# Patient Record
Sex: MALE | Race: WHITE | NOT HISPANIC OR LATINO | Employment: OTHER | URBAN - METROPOLITAN AREA
[De-identification: names, ages, dates, MRNs, and addresses within clinical notes are randomized per-mention and may not be internally consistent; named-entity substitution may affect disease eponyms.]

---

## 2017-05-25 ENCOUNTER — OFFICE VISIT - HEALTHEAST (OUTPATIENT)
Dept: VASCULAR SURGERY | Facility: CLINIC | Age: 62
End: 2017-05-25

## 2017-05-25 ENCOUNTER — AMBULATORY - HEALTHEAST (OUTPATIENT)
Dept: VASCULAR SURGERY | Facility: CLINIC | Age: 62
End: 2017-05-25

## 2017-05-25 ENCOUNTER — RECORDS - HEALTHEAST (OUTPATIENT)
Dept: VASCULAR ULTRASOUND | Facility: CLINIC | Age: 62
End: 2017-05-25

## 2017-05-25 DIAGNOSIS — I10 HTN (HYPERTENSION): ICD-10-CM

## 2017-05-25 DIAGNOSIS — Z72.0 TOBACCO ABUSE: ICD-10-CM

## 2017-05-25 DIAGNOSIS — I71.40 ABDOMINAL AORTIC ANEURYSM (AAA) WITHOUT RUPTURE (H): ICD-10-CM

## 2017-05-25 DIAGNOSIS — I71.40 ABDOMINAL AORTIC ANEURYSM, WITHOUT RUPTURE: ICD-10-CM

## 2017-05-25 ASSESSMENT — MIFFLIN-ST. JEOR: SCORE: 1906.17

## 2017-06-09 ENCOUNTER — RECORDS - HEALTHEAST (OUTPATIENT)
Dept: ADMINISTRATIVE | Facility: OTHER | Age: 62
End: 2017-06-09

## 2017-06-14 ENCOUNTER — COMMUNICATION - HEALTHEAST (OUTPATIENT)
Dept: SURGERY | Facility: CLINIC | Age: 62
End: 2017-06-14

## 2021-05-31 VITALS — HEIGHT: 72 IN | BODY MASS INDEX: 32.64 KG/M2 | WEIGHT: 241 LBS

## 2021-06-10 NOTE — PROGRESS NOTES
HPI: Mr. Bailey presents for follow-up of his abdominal aortic aneurysm.  He has been doing well, with no significant abdominal or back pain.    Allergies, Medications, Social History, Past Medical History and Past Surgical History were reviewed and are noted in the chart.      BP (!) 142/100 (Patient Site: Left Arm, Patient Position: Sitting, Cuff Size: Adult Regular)  Pulse 78  Temp 98.2  F (36.8  C) (Oral)   Resp 16  Ht 6' (1.829 m)  Wt (!) 241 lb (109.3 kg)  BMI 32.69 kg/m2  Body mass index is 32.69 kg/(m^2).    EXAM:  GENERAL: This is an obese male in no distress.      IMAGES:   Us Aorta Ivc Iliac Non Graft Complete    Result Date: 5/25/2017  US AORTA IVC ILIAC NON GRAFT COMPLETE 5/25/2017 9:40 AM INDICATION: aaa TECHNIQUE: Ultrasound using gray-scale, two-dimensional images. COMPARISON: 11/10/2016 FINDINGS: There is mild atheromatous plaque in the abdominal aorta. MEASUREMENTS: Abdominal Aorta: Proximal: 2.3 x 2.2 cm Mid: 3 x 2.7 cm Distal: 4.4 x 5.6 cm Right Common Iliac Artery: 2.1 x 2.2 cm Left Common Iliac Artery: 1.4 x 1.6 cm     CONCLUSION: 1.  Infrarenal abdominal aortic aneurysm which extends into the right common iliac artery. The aneurysm has increased in size in comparison to previous study.      I reviewed his ultrasound with the patient. The raw numbers show the aneurysm measures 5.6 cm, compared to 5.0 cm on the previous occasion.    Assessment/Plan: Mr. Bailey appears to be doing well.   The aneurysm is at a size where we should intervene.  I had a long discussion with Mr. Bailey, and the session was entirely consultative. I discussed both stenting and open repair, and edda diagrams of both procedures. Open repair involves significant initial risk (3% mortality, 15-20% complications) but is durable once the perioperative period has passed.  In contrast, stenting involves little initial risk but has an ongoing failure rate of approximately 2% per year.  Comparison at approximately 10-15  years shows no difference in survival rate between the 2 procedures.  In young, healthy patients we would recommend open operation, and in elderly frail patients we would recommend stenting.  There is a large gray area in between those 2 extremes, and I believe the patient falls within that gray area.  All in all, I have recommended that we should proceed with stenting if her aneurysm is amenable to the stent procedure.   We will obtain a CTA to confirm the size of the aneurysm, and to evaluate the aneurysm for endovascular repair. He will return following the CTA.       Josemanuel Hayden MD

## 2021-06-10 NOTE — PROGRESS NOTES
Follow-up, AAA, aortic US done 11/10/2016 showed aneurysm to measure 4.4 x 5.0 cm. Patient has aortic imaging prior to appt shows an increase in the size of the aneurysm. Patients b/p today is 142/100, patient had been on Atenolol, this was d/c and patient was started on lisinopril. He also continues to smoke.

## 2021-10-16 ENCOUNTER — HOSPITAL ENCOUNTER (INPATIENT)
Facility: HOSPITAL | Age: 66
LOS: 2 days | Discharge: HOME | DRG: 395 | End: 2021-10-18
Attending: HOSPITALIST | Admitting: HOSPITALIST
Payer: COMMERCIAL

## 2021-10-16 ENCOUNTER — ANCILLARY PROCEDURE (OUTPATIENT)
Dept: RADIOLOGY | Facility: CLINIC | Age: 66
End: 2021-10-16
Payer: COMMERCIAL

## 2021-10-16 ENCOUNTER — TRANSFERRED RECORDS (OUTPATIENT)
Dept: HEALTH INFORMATION MANAGEMENT | Facility: CLINIC | Age: 66
End: 2021-10-16

## 2021-10-16 DIAGNOSIS — K56.609 SBO (SMALL BOWEL OBSTRUCTION) (H): Primary | ICD-10-CM

## 2021-10-16 DIAGNOSIS — F17.200 TOBACCO USE DISORDER: ICD-10-CM

## 2021-10-16 DIAGNOSIS — I71.40 ENLARGING ABDOMINAL AORTIC ANEURYSM (AAA) (H): ICD-10-CM

## 2021-10-16 DIAGNOSIS — M75.21 BICEPS TENDONITIS, RIGHT: ICD-10-CM

## 2021-10-16 DIAGNOSIS — K43.9 VENTRAL HERNIA WITHOUT OBSTRUCTION OR GANGRENE: ICD-10-CM

## 2021-10-16 DIAGNOSIS — I10 ESSENTIAL HYPERTENSION: ICD-10-CM

## 2021-10-16 LAB
ALBUMIN SERPL-MCNC: 3.3 G/DL (ref 3.5–5)
ALP SERPL-CCNC: 75 U/L (ref 45–120)
ALT SERPL W P-5'-P-CCNC: 16 U/L (ref 0–45)
ANION GAP SERPL CALCULATED.3IONS-SCNC: 11 MMOL/L (ref 5–18)
AST SERPL W P-5'-P-CCNC: 18 U/L (ref 0–40)
BILIRUB SERPL-MCNC: 1.3 MG/DL (ref 0–1)
BUN SERPL-MCNC: 30 MG/DL (ref 8–22)
CALCIUM SERPL-MCNC: 9.7 MG/DL (ref 8.5–10.5)
CHLORIDE BLD-SCNC: 104 MMOL/L (ref 98–107)
CO2 SERPL-SCNC: 23 MMOL/L (ref 22–31)
CREAT SERPL-MCNC: 1.39 MG/DL (ref 0.7–1.3)
GFR SERPL CREATININE-BSD FRML MDRD: 52 ML/MIN/1.73M2
GLUCOSE BLD-MCNC: 109 MG/DL (ref 70–125)
MAGNESIUM SERPL-MCNC: 2.3 MG/DL (ref 1.8–2.6)
POTASSIUM BLD-SCNC: 4.6 MMOL/L (ref 3.5–5)
PROT SERPL-MCNC: 7 G/DL (ref 6–8)
SARS-COV-2 RNA RESP QL NAA+PROBE: NEGATIVE
SODIUM SERPL-SCNC: 138 MMOL/L (ref 136–145)

## 2021-10-16 PROCEDURE — 120N000001 HC R&B MED SURG/OB

## 2021-10-16 PROCEDURE — 87635 SARS-COV-2 COVID-19 AMP PRB: CPT | Performed by: HOSPITALIST

## 2021-10-16 PROCEDURE — 36415 COLL VENOUS BLD VENIPUNCTURE: CPT | Performed by: HOSPITALIST

## 2021-10-16 PROCEDURE — 99223 1ST HOSP IP/OBS HIGH 75: CPT | Performed by: HOSPITALIST

## 2021-10-16 PROCEDURE — 258N000003 HC RX IP 258 OP 636: Performed by: HOSPITALIST

## 2021-10-16 PROCEDURE — 93005 ELECTROCARDIOGRAM TRACING: CPT

## 2021-10-16 PROCEDURE — 80053 COMPREHEN METABOLIC PANEL: CPT | Performed by: HOSPITALIST

## 2021-10-16 PROCEDURE — 93005 ELECTROCARDIOGRAM TRACING: CPT | Performed by: HOSPITALIST

## 2021-10-16 PROCEDURE — 250N000013 HC RX MED GY IP 250 OP 250 PS 637: Performed by: HOSPITALIST

## 2021-10-16 PROCEDURE — 999N000157 HC STATISTIC RCP TIME EA 10 MIN

## 2021-10-16 PROCEDURE — 83735 ASSAY OF MAGNESIUM: CPT | Performed by: HOSPITALIST

## 2021-10-16 PROCEDURE — 93010 ELECTROCARDIOGRAM REPORT: CPT | Performed by: INTERNAL MEDICINE

## 2021-10-16 PROCEDURE — 999N000054 HC STATISTIC EKG NON-CHARGEABLE

## 2021-10-16 RX ORDER — ACETAMINOPHEN 650 MG/1
650 SUPPOSITORY RECTAL EVERY 6 HOURS PRN
Status: DISCONTINUED | OUTPATIENT
Start: 2021-10-16 | End: 2021-10-18 | Stop reason: HOSPADM

## 2021-10-16 RX ORDER — ATENOLOL 25 MG/1
100 TABLET ORAL DAILY
Status: DISCONTINUED | OUTPATIENT
Start: 2021-10-16 | End: 2021-10-17

## 2021-10-16 RX ORDER — PROCHLORPERAZINE 25 MG
12.5 SUPPOSITORY, RECTAL RECTAL EVERY 12 HOURS PRN
Status: DISCONTINUED | OUTPATIENT
Start: 2021-10-16 | End: 2021-10-18 | Stop reason: HOSPADM

## 2021-10-16 RX ORDER — CARBOXYMETHYLCELLULOSE SODIUM 5 MG/ML
1 SOLUTION/ DROPS OPHTHALMIC 4 TIMES DAILY PRN
Status: DISCONTINUED | OUTPATIENT
Start: 2021-10-16 | End: 2021-10-18 | Stop reason: HOSPADM

## 2021-10-16 RX ORDER — ONDANSETRON 4 MG/1
4 TABLET, ORALLY DISINTEGRATING ORAL EVERY 6 HOURS PRN
Status: DISCONTINUED | OUTPATIENT
Start: 2021-10-16 | End: 2021-10-18 | Stop reason: HOSPADM

## 2021-10-16 RX ORDER — B-COMPLEX WITH VITAMIN C
100 TABLET ORAL DAILY
COMMUNITY

## 2021-10-16 RX ORDER — SODIUM CHLORIDE 9 MG/ML
INJECTION, SOLUTION INTRAVENOUS CONTINUOUS
Status: DISCONTINUED | OUTPATIENT
Start: 2021-10-16 | End: 2021-10-18

## 2021-10-16 RX ORDER — LABETALOL HYDROCHLORIDE 5 MG/ML
10 INJECTION, SOLUTION INTRAVENOUS EVERY 4 HOURS PRN
Status: DISCONTINUED | OUTPATIENT
Start: 2021-10-16 | End: 2021-10-18 | Stop reason: HOSPADM

## 2021-10-16 RX ORDER — LIDOCAINE 40 MG/G
CREAM TOPICAL
Status: DISCONTINUED | OUTPATIENT
Start: 2021-10-16 | End: 2021-10-18 | Stop reason: HOSPADM

## 2021-10-16 RX ORDER — ONDANSETRON 2 MG/ML
4 INJECTION INTRAMUSCULAR; INTRAVENOUS EVERY 6 HOURS PRN
Status: DISCONTINUED | OUTPATIENT
Start: 2021-10-16 | End: 2021-10-18 | Stop reason: HOSPADM

## 2021-10-16 RX ORDER — BISACODYL 10 MG
10 SUPPOSITORY, RECTAL RECTAL DAILY PRN
Status: DISCONTINUED | OUTPATIENT
Start: 2021-10-16 | End: 2021-10-18 | Stop reason: HOSPADM

## 2021-10-16 RX ORDER — HEPARIN SODIUM 5000 [USP'U]/.5ML
5000 INJECTION, SOLUTION INTRAVENOUS; SUBCUTANEOUS EVERY 12 HOURS
Status: DISCONTINUED | OUTPATIENT
Start: 2021-10-16 | End: 2021-10-18 | Stop reason: HOSPADM

## 2021-10-16 RX ORDER — PROCHLORPERAZINE MALEATE 5 MG
5 TABLET ORAL EVERY 6 HOURS PRN
Status: DISCONTINUED | OUTPATIENT
Start: 2021-10-16 | End: 2021-10-18 | Stop reason: HOSPADM

## 2021-10-16 RX ORDER — AMOXICILLIN 250 MG
2 CAPSULE ORAL 2 TIMES DAILY PRN
Status: DISCONTINUED | OUTPATIENT
Start: 2021-10-16 | End: 2021-10-18 | Stop reason: HOSPADM

## 2021-10-16 RX ORDER — MAGNESIUM HYDROXIDE/ALUMINUM HYDROXICE/SIMETHICONE 120; 1200; 1200 MG/30ML; MG/30ML; MG/30ML
30 SUSPENSION ORAL EVERY 4 HOURS PRN
Status: DISCONTINUED | OUTPATIENT
Start: 2021-10-16 | End: 2021-10-18 | Stop reason: HOSPADM

## 2021-10-16 RX ORDER — CHLORAL HYDRATE 500 MG
4 CAPSULE ORAL 2 TIMES DAILY
COMMUNITY

## 2021-10-16 RX ORDER — AMOXICILLIN 250 MG
1 CAPSULE ORAL 2 TIMES DAILY PRN
Status: DISCONTINUED | OUTPATIENT
Start: 2021-10-16 | End: 2021-10-18 | Stop reason: HOSPADM

## 2021-10-16 RX ORDER — ACETAMINOPHEN 325 MG/1
650 TABLET ORAL EVERY 6 HOURS PRN
Status: DISCONTINUED | OUTPATIENT
Start: 2021-10-16 | End: 2021-10-18 | Stop reason: HOSPADM

## 2021-10-16 RX ADMIN — ATENOLOL 100 MG: 25 TABLET ORAL at 21:29

## 2021-10-16 RX ADMIN — SODIUM CHLORIDE: 9 INJECTION, SOLUTION INTRAVENOUS at 20:59

## 2021-10-16 ASSESSMENT — ACTIVITIES OF DAILY LIVING (ADL)
CONCENTRATING,_REMEMBERING_OR_MAKING_DECISIONS_DIFFICULTY: NO
DRESSING/BATHING_DIFFICULTY: NO
WEAR_GLASSES_OR_BLIND: YES
DIFFICULTY_EATING/SWALLOWING: NO
PATIENT_/_FAMILY_COMMUNICATION_STYLE: SPOKEN LANGUAGE (ENGLISH OR BILINGUAL)
TOILETING_ISSUES: NO
DOING_ERRANDS_INDEPENDENTLY_DIFFICULTY: NO
FALL_HISTORY_WITHIN_LAST_SIX_MONTHS: NO
HEARING_DIFFICULTY_OR_DEAF: NO
DIFFICULTY_COMMUNICATING: NO
WALKING_OR_CLIMBING_STAIRS_DIFFICULTY: NO

## 2021-10-16 ASSESSMENT — MIFFLIN-ST. JEOR: SCORE: 1883.02

## 2021-10-17 ENCOUNTER — APPOINTMENT (OUTPATIENT)
Dept: RADIOLOGY | Facility: HOSPITAL | Age: 66
DRG: 395 | End: 2021-10-17
Attending: STUDENT IN AN ORGANIZED HEALTH CARE EDUCATION/TRAINING PROGRAM
Payer: COMMERCIAL

## 2021-10-17 ENCOUNTER — APPOINTMENT (OUTPATIENT)
Dept: RADIOLOGY | Facility: HOSPITAL | Age: 66
DRG: 395 | End: 2021-10-17
Attending: HOSPITALIST
Payer: COMMERCIAL

## 2021-10-17 PROBLEM — F17.200 TOBACCO USE DISORDER: Status: ACTIVE | Noted: 2021-10-17

## 2021-10-17 PROBLEM — N17.9 AKI (ACUTE KIDNEY INJURY) (H): Status: ACTIVE | Noted: 2021-10-17

## 2021-10-17 LAB
ANION GAP SERPL CALCULATED.3IONS-SCNC: 6 MMOL/L (ref 5–18)
ATRIAL RATE - MUSE: 72 BPM
BUN SERPL-MCNC: 34 MG/DL (ref 8–22)
CALCIUM SERPL-MCNC: 8.7 MG/DL (ref 8.5–10.5)
CHLORIDE BLD-SCNC: 107 MMOL/L (ref 98–107)
CO2 SERPL-SCNC: 28 MMOL/L (ref 22–31)
CREAT SERPL-MCNC: 1.52 MG/DL (ref 0.7–1.3)
DIASTOLIC BLOOD PRESSURE - MUSE: NORMAL MMHG
ERYTHROCYTE [DISTWIDTH] IN BLOOD BY AUTOMATED COUNT: 14.9 % (ref 10–15)
GFR SERPL CREATININE-BSD FRML MDRD: 46 ML/MIN/1.73M2
GLUCOSE BLD-MCNC: 94 MG/DL (ref 70–125)
HCT VFR BLD AUTO: 39.4 % (ref 40–53)
HGB BLD-MCNC: 12.7 G/DL (ref 13.3–17.7)
INTERPRETATION ECG - MUSE: NORMAL
MAGNESIUM SERPL-MCNC: 2.4 MG/DL (ref 1.8–2.6)
MCH RBC QN AUTO: 31.8 PG (ref 26.5–33)
MCHC RBC AUTO-ENTMCNC: 32.2 G/DL (ref 31.5–36.5)
MCV RBC AUTO: 99 FL (ref 78–100)
P AXIS - MUSE: 8 DEGREES
PLATELET # BLD AUTO: 265 10E3/UL (ref 150–450)
POTASSIUM BLD-SCNC: 4.3 MMOL/L (ref 3.5–5)
PR INTERVAL - MUSE: 168 MS
QRS DURATION - MUSE: 94 MS
QT - MUSE: 394 MS
QTC - MUSE: 431 MS
R AXIS - MUSE: 49 DEGREES
RBC # BLD AUTO: 4 10E6/UL (ref 4.4–5.9)
SODIUM SERPL-SCNC: 141 MMOL/L (ref 136–145)
SYSTOLIC BLOOD PRESSURE - MUSE: NORMAL MMHG
T AXIS - MUSE: 68 DEGREES
VENTRICULAR RATE- MUSE: 72 BPM
WBC # BLD AUTO: 6.9 10E3/UL (ref 4–11)

## 2021-10-17 PROCEDURE — 999N000065 XR ABDOMEN PORT 1 VIEWS

## 2021-10-17 PROCEDURE — 99232 SBSQ HOSP IP/OBS MODERATE 35: CPT | Performed by: FAMILY MEDICINE

## 2021-10-17 PROCEDURE — 250N000011 HC RX IP 250 OP 636: Performed by: HOSPITALIST

## 2021-10-17 PROCEDURE — 80048 BASIC METABOLIC PNL TOTAL CA: CPT | Performed by: HOSPITALIST

## 2021-10-17 PROCEDURE — 99207 PR NOT IN PERSON INPATIENT CONSULT STATISTICAL MARKER: CPT | Performed by: SURGERY

## 2021-10-17 PROCEDURE — 85027 COMPLETE CBC AUTOMATED: CPT | Performed by: HOSPITALIST

## 2021-10-17 PROCEDURE — 250N000013 HC RX MED GY IP 250 OP 250 PS 637: Performed by: MASSAGE THERAPIST

## 2021-10-17 PROCEDURE — 36415 COLL VENOUS BLD VENIPUNCTURE: CPT | Performed by: HOSPITALIST

## 2021-10-17 PROCEDURE — 99222 1ST HOSP IP/OBS MODERATE 55: CPT | Performed by: SURGERY

## 2021-10-17 PROCEDURE — 99207 PR CDG-MDM COMPONENT: MEETS MODERATE - DOWN CODED: CPT | Performed by: FAMILY MEDICINE

## 2021-10-17 PROCEDURE — 83735 ASSAY OF MAGNESIUM: CPT | Performed by: HOSPITALIST

## 2021-10-17 PROCEDURE — 258N000003 HC RX IP 258 OP 636: Performed by: HOSPITALIST

## 2021-10-17 PROCEDURE — 74018 RADEX ABDOMEN 1 VIEW: CPT

## 2021-10-17 PROCEDURE — 120N000001 HC R&B MED SURG/OB

## 2021-10-17 PROCEDURE — 99207 PR NO CHARGE LOS: CPT | Performed by: PHYSICIAN ASSISTANT

## 2021-10-17 RX ORDER — NICOTINE 21 MG/24HR
1 PATCH, TRANSDERMAL 24 HOURS TRANSDERMAL DAILY PRN
Status: DISCONTINUED | OUTPATIENT
Start: 2021-10-17 | End: 2021-10-18 | Stop reason: HOSPADM

## 2021-10-17 RX ORDER — HYDRALAZINE HYDROCHLORIDE 20 MG/ML
10 INJECTION INTRAMUSCULAR; INTRAVENOUS EVERY 4 HOURS PRN
Status: DISCONTINUED | OUTPATIENT
Start: 2021-10-17 | End: 2021-10-18 | Stop reason: HOSPADM

## 2021-10-17 RX ORDER — ATENOLOL 25 MG/1
100 TABLET ORAL DAILY
Status: COMPLETED | OUTPATIENT
Start: 2021-10-17 | End: 2021-10-17

## 2021-10-17 RX ADMIN — ATENOLOL 100 MG: 25 TABLET ORAL at 20:35

## 2021-10-17 RX ADMIN — SODIUM CHLORIDE: 9 INJECTION, SOLUTION INTRAVENOUS at 05:14

## 2021-10-17 RX ADMIN — SODIUM CHLORIDE: 9 INJECTION, SOLUTION INTRAVENOUS at 15:55

## 2021-10-17 RX ADMIN — HEPARIN SODIUM 5000 UNITS: 10000 INJECTION, SOLUTION INTRAVENOUS; SUBCUTANEOUS at 20:35

## 2021-10-17 RX ADMIN — DIATRIZOATE MEGLUMINE AND DIATRIZOATE SODIUM 75 ML: 660; 100 SOLUTION ORAL; RECTAL at 03:17

## 2021-10-17 ASSESSMENT — MIFFLIN-ST. JEOR: SCORE: 1877.58

## 2021-10-17 NOTE — H&P
Admission History and Physical   Jose Bailey,  1955, MRN [id number]    [facility]    PCP: No primary care provider on file., None          Extended Emergency Contact Information  Primary Emergency Contact: Lalita Bailey   [facility]  Home Phone: [phone]  Relation: Other       Assessment and Plan     66M with AAA, umbilical hernia, tobacco abuse, HTN who presents with abdominal pain and is found to have a SBO and enlarging AAA    #SBO - transition point read as proximal to hernia.  Incisional ventral hernia is soft and reducible.  -keep NG LIWS, , NPO, IVF, SBFT in AM, surgery consult    #enlarging AAA (8cm from 5.5 previously) - BP control, needs to quit smoking, vascular consult.    #HTN - atenolol, labetalol PRN    #CONNIE - CPAP    #THOMPSON vs. CKD3 - IVF and monitor    #tobacco abuse (1PPD) - cessation.      Clinically Significant Risk Factors Present on Admission                     Checklist:  Code Status:   full  Diet:   NPO  Hernadez Catheter: Not present  Central Lines: None  DVT px:  Heparin SQ        Advanced Care Planning  I anticipate the patient will be admitted to the hospital for at least 2 midnights for the evaluation and treatment of the conditions discussed above.     Chief Complaint: Abdominal pain     HPI:    Jose Bailey is a 66 year old male AAA (didn't follow-up in 2017, 5.6cm at the time), umbilical hernia, HTN, 50pack year smoking history (ongoing use) who presents with several days of painless abdominal bloating and inability to pass gas.  Has been having BM with last one this AM.  No nausea, emesis or abdominal pain.        Noted ER note in care everywhere regarding use of horse dewormers and chronic lyme.        In the ER:  CT demonstrated SBO with transition point just proximal to ventral hernia  and enlarging AAA now 8cm.   ER D/w vascular and no need for emergent intervention.  NG placed with return of 1L brownish content     Labs in care everywhere Cr  1.44, no K, glucose 116 WBC 11.8    History is provided by patient and chart review       Physical Exam:  BP: ()/()   Arterial Line BP: ()/()   /73 (BP Location: Left arm)   Pulse 70   Temp 98.6  F (37  C) (Oral)   Resp 16   Ht 1.829 m (6')   Wt 107.5 kg (237 lb)   SpO2 93%   BMI 32.14 kg/m       General:  Alert, cooperative, no distress,  Appears stated age  Neurologic:  oriented, facialsymmetry preserved, fluent speech. Moves all 4 spontaneously  Psych: calm, mood and affect appropriate to situation  HEENT:  Anicteric, MMM, unremarkable dentition  CV: RRR no MRG, normal S1 and S2, no edema  Lungs: CTAB.  Easyrespirations  Abd: softly distended, well healed midline surgical scar with hernia which is soft, non tender and easily reducible.    Skin: no rashes noted on exposed skin. Color and turgor normal  Central Lines and Tubes: None (no fraga, CVC, feeding tubes)         Pertinent Test Findings  Radiology Results (results reviewed):   No results found for this visit on 10/16/21.    EKG (personally reviewed): normal sinus rhythm and no acute ischemic changes      Medical History  No past medical history on file.    As above Surgical History  Past Surgical History:   Procedure Laterality Date     COLECTOMY       CYSTOSCOPY         Hx cholecystectomy, partial L hemicolectomy.   Social History  Social History     Tobacco Use     Smoking status: Current Every Day Smoker     Packs/day: 1.50     Years: 40.00     Pack years: 60.00     Types: Cigarettes, Cigarettes     Smokeless tobacco: Never Used   Substance Use Topics     Alcohol use: Yes     Drug use: No          Allergies  No Known Allergies Family History  I have reviewed this patient's family history and updated it with pertinent information if needed.  Family History   Problem Relation Age of Onset     Cerebrovascular Disease Mother      Aneurysm Mother      No Known Problems Father                Prior to Admission Medications   Cannot display prior to  admission medications because the patient has not been admitted in this contact.          Review of Systems:    10point review of systems negative except as listed in HPI      Pertinent Labs  Lab Results: personally reviewed.     Most Recent 3 CBC's:No lab results found.  Most Recent 3 BMP's:No lab results found.  Most Recent 2 LFT's:No lab results found.  Most Recent 3 INR's:No lab results found.  Most Recent 3 Troponin's:No lab results found.    Dayana Braxton MD  Internal Medicine Hospitalist  10/16/2021  7:32 PM

## 2021-10-17 NOTE — PROGRESS NOTES
Met with patient to discuss use of overnight CPAP. Patient says he doesn't use CPAP, doesn't own a CPAP machine, and is not interested in using a hospital unit.     Yudelka Abbasi, RT

## 2021-10-17 NOTE — CONSULTS
SW consult completed. See progress note dated 10/17/21.  Delmy Tavarez, HODA on 10/17/2021 at 3:09 PM

## 2021-10-17 NOTE — PROGRESS NOTES
Care Management Initial Consult    General Information  Assessment completed with: Patient,    Type of CM/SW Visit: Initial Assessment    Primary Care Provider verified and updated as needed:     Readmission within the last 30 days:        Reason for Consult: discharge planning  Advance Care Planning:            Communication Assessment  Patient's communication style: spoken language (English or Bilingual)    Hearing Difficulty or Deaf: no   Wear Glasses or Blind: yes    Cognitive  Cognitive/Neuro/Behavioral: WDL                      Living Environment:   People in home: alone     Current living Arrangements: mobile home      Able to return to prior arrangements: yes       Family/Social Support:  Care provided by: self  Provides care for: no one                Description of Support System:           Current Resources:   Patient receiving home care services:       Community Resources:    Equipment currently used at home: none  Supplies currently used at home:      Employment/Financial:  Employment Status:          Financial Concerns:             Lifestyle & Psychosocial Needs:  Social Determinants of Health     Tobacco Use: High Risk     Smoking Tobacco Use: Current Every Day Smoker     Smokeless Tobacco Use: Never Used   Alcohol Use:      Frequency of Alcohol Consumption:      Average Number of Drinks:      Frequency of Binge Drinking:    Financial Resource Strain:      Difficulty of Paying Living Expenses:    Food Insecurity:      Worried About Running Out of Food in the Last Year:      Ran Out of Food in the Last Year:    Transportation Needs:      Lack of Transportation (Medical):      Lack of Transportation (Non-Medical):    Physical Activity:      Days of Exercise per Week:      Minutes of Exercise per Session:    Stress:      Feeling of Stress :    Social Connections:      Frequency of Communication with Friends and Family:      Frequency of Social Gatherings with Friends and Family:      Attends [employer]  Services:      Active Member of Clubs or Organizations:      Attends Club or Organization Meetings:      Marital Status:    Intimate Partner Violence:      Fear of Current or Ex-Partner:      Emotionally Abused:      Physically Abused:      Sexually Abused:    Depression:      PHQ-2 Score:    Housing Stability:      Unable to Pay for Housing in the Last Year:      Number of Places Lived in the Last Year:      Unstable Housing in the Last Year:          Additional Information:  SW met with pt for initial assessment. Pt reports living alone in a trailer and being normally independent with no services. Pt denies discharge needs currently but is agreeable to SW following care for increased needs.    Delmy Tavarez, TOMMIESW

## 2021-10-17 NOTE — PLAN OF CARE
Problem: Fluid Deficit (Intestinal Obstruction)  Goal: Fluid Balance  Outcome: Improving     Problem: Nausea and Vomiting (Intestinal Obstruction)  Goal: Nausea and Vomiting Relief  Outcome: Improving     Problem: Pain (Intestinal Obstruction)  Goal: Acceptable Pain Control  Outcome: Improving     Pt denied pain this shift, up with SBA to bathroom. NPO for gastrograffin challenge this AM. Contrast given at 0325 and NG suction turned off, has denied nausea since then. His x-ray will be done 6643-1684. His HR overnight was praveen and irregular, MD notified, no new orders. Pt is passing flatus, and had 1 BM as well.

## 2021-10-17 NOTE — PLAN OF CARE
Problem: Adult Inpatient Plan of Care  Goal: Plan of Care Review  Outcome: Improving   /71. MD notified and atenolol given. Recheck /71.   Problem: Fluid Deficit (Intestinal Obstruction)  Goal: Fluid Balance  Outcome: Improving   Patient on IVF NS running at 100 ml/hr. Started on clear liquid tolerated well.  Problem: Nausea and Vomiting (Intestinal Obstruction)  Goal: Nausea and Vomiting Relief  Outcome: Improving   NG clamped patient tolerated well. No nausea or vomiting noted.  Problem: Pain (Intestinal Obstruction)  Goal: Acceptable Pain Control  Outcome: Improving   Patient denied any pain or discomfort. Passing gas and had loss/watery BM.

## 2021-10-17 NOTE — PHARMACY-ADMISSION MEDICATION HISTORY
Pharmacy Note - Admission Medication History    Pertinent Provider Information:      ______________________________________________________________________    Prior To Admission (PTA) med list completed and updated in EMR.       PTA Med List   Medication Sig Last Dose     ascorbic acid, vitamin C, (VITAMIN C) 1000 MG tablet Take 2,000 mg by mouth daily  10/15/2021 at am     atenolol (TENORMIN) 100 MG tablet Take 100 mg by mouth daily  10/16/2021 at Unknown time     b complex vitamins (B COMPLEX 1) tablet Take 1 tablet by mouth daily  10/15/2021 at am     Charcoal Activated (ACTIVATED CHARCOAL PO) Take 2 capsules by mouth daily 10/15/2021 at Unknown time     cholecalciferol (VITAMIN D3) 125 mcg (5000 units) capsule Take 125 mcg by mouth daily 10/15/2021 at Unknown time     docusate sodium (STOOL SOFTENER) 100 MG capsule Take 100 mg by mouth 2 times daily  10/15/2021 at Unknown time     fish oil-omega-3 fatty acids 1000 MG capsule Take 4 g by mouth 2 times daily 10/15/2021 at Unknown time     Milk Thistle 175 MG CAPS Take 2 capsules by mouth daily 10/15/2021 at Unknown time     Misc Natural Products (URINOZINC PROSTATE PO) Take 1 Tablespoonful by mouth 2 times daily 10/15/2021 at Unknown time     saw palmetto fruit 450 mg cap Take 900 mg by mouth 2 times daily  10/15/2021 at Unknown time     Zinc 100 MG TABS Take 100 mg by mouth daily 10/15/2021 at Unknown time       Information source(s): Patient and Clinic records  Method of interview communication: phone    Summary of Changes to PTA Med List  New: milk thistle, activated charcoal, Vitamin D, zinc  Discontinued: azithromycin, cefuroxime, cholestryamine, ciprofloxacin, doxycycline, metronidazole  Changed: Fish Oil    Patient was asked about OTC/herbal products specifically.  PTA med list reflects this.    In the past week, patient estimated taking medication this percent of the time:  greater than 90%.    Allergies were reviewed, assessed, and updated with the  patient.      Patient does not use any multi-dose medications prior to admission.    The information provided in this note is only as accurate as the sources available at the time of the update(s).    Thank you for the opportunity to participate in the care of this patient.    Lisa Landon [facility]  10/16/2021 9:48 PM

## 2021-10-17 NOTE — CONSULTS
[facility]ERY [facility] PATIENT CONSULTATION NOTE    HPI:  Mr. Jose Bailey is a 65 yo gentleman with a history of laparotomy and sigmoidectomy for diverticular disease (2004) who was transferred from [facility] for management of a high-grade small bowel obstruction in the setting of a ventral abdominal hernia. Vascular Surgery consulted for management of an infrarenal abdominal aortic aneurysm measuring 8.0 x 6.8 cm. He is currently asymptomatic - no abdominal or back pain. He is a current 40 pack year smoking history. He lives at home by himself. Reports that he ambulates quite a bit and is relatively active - cuts wood regularly.     REFERRAL SOURCE: Hospitalist     PAST MEDICAL HISTORY:  No significant medical history    PAST SURGICAL HISTORY:  Laparotomy and sigmoidectomy for diverticular disease - 2004     FAMILY HISTORY:  Family History   Problem Relation Age of Onset     Cerebrovascular Disease Mother      Aneurysm Mother      No Known Problems Father        SOCIAL HISTORY:   Social History     Tobacco Use     Smoking status: Current Every Day Smoker     Packs/day: 1.50     Years: 40.00     Pack years: 60.00     Types: Cigarettes, Cigarettes     Smokeless tobacco: Never Used   Substance Use Topics     Alcohol use: Yes       TOBACCO USE:  Every day smoker     FUNCTIONAL CAPACITY:    Lives at home by himself. . Cuts wood regularly.     HOME MEDICATIONS:  Prior to Admission medications    Medication Sig Start Date End Date Taking? Authorizing Provider   ascorbic acid, vitamin C, (VITAMIN C) 1000 MG tablet Take 2,000 mg by mouth daily  4/4/16  Yes Provider, Historical   atenolol (TENORMIN) 100 MG tablet Take 100 mg by mouth daily  2/8/16  Yes Provider, Historical   b complex vitamins (B COMPLEX 1) tablet Take 1 tablet by mouth daily  4/4/16  Yes Provider, Historical   Charcoal Activated (ACTIVATED CHARCOAL PO) Take 2 capsules by mouth daily   Yes Unknown, Entered By History    cholecalciferol (VITAMIN D3) 125 mcg (5000 units) capsule Take 125 mcg by mouth daily   Yes Unknown, Entered By History   docusate sodium (STOOL SOFTENER) 100 MG capsule Take 100 mg by mouth 2 times daily  4/4/16  Yes Provider, Historical   fish oil-omega-3 fatty acids 1000 MG capsule Take 4 g by mouth 2 times daily   Yes Unknown, Entered By History   Milk Thistle 175 MG CAPS Take 2 capsules by mouth daily   Yes Unknown, Entered By History   Misc Natural Products (URINOZINC PROSTATE PO) Take 1 Tablespoonful by mouth 2 times daily   Yes Unknown, Entered By History   saw palmetto fruit 450 mg cap Take 900 mg by mouth 2 times daily  8/4/15  Yes Provider, Historical   Zinc 100 MG TABS Take 100 mg by mouth daily   Yes Unknown, Entered By History       VITAL SIGNS:  Temp: 98.3  F (36.8  C) Temp src: Oral BP: 121/60 Pulse: 50   Resp: 16 SpO2: 93 % O2 Device: Nasal cannula Oxygen Delivery: 1 LPM    237 lbs 0 oz    PHYSICAL EXAM:  GEN: Well-appearing; no acute distress; obese habitus   LUNGS: Unlabored respirations on room air  CARDIO: Regular rate and rhythm  ABD: S, NT, ND; well healed laparotomy incision; unable to feel large pulsatile mass in the abdomen    VASC: 2+ palpable femoral pulses bilaterally; 2+ DP pulses bilaterally     CT:   IMPRESSION:   1.  High-grade small bowel obstruction with the transition point in the upper mid abdomen, just proximal to the ventral abdominal hernia.   2.  An 8.0 cm abdominal aortic artery aneurysm, previously 5.0 cm. Recommend vascular surgery consultation.       ASSESSMENT:  Patient Active Problem List   Diagnosis     Essential hypertension     Irritable bowel syndrome     Obstructive sleep apnea syndrome     Benign prostatic hyperplasia with urinary obstruction     Enlarging abdominal aortic aneurysm (AAA) (H)     SBO (small bowel obstruction) (H)   67 yo gentleman with a history of laparotomy and sigmoidectomy for diverticular disease (2004) who was transferred from [facility]  [facility] for management of a high-grade small bowel obstruction in the setting of a ventral abdominal hernia. Vascular Surgery consulted for an asymptomatic 8.0 cm infrarenal abdominal aortic aneurysm.     PLAN:  - No acute vascular surgery intervention; will need repair of aneurysm once recovered from bowel obstruction  - Will obtain CTA Abdomen and Pelvis with 1 mm cuts for operative planning; currently IVF hydration   - Will continue to follow peripherally     Discussed with Dr. Tejada who is in agreement with the above plan.    Antwon Miguel MD  Vascular Surgery Fellow

## 2021-10-17 NOTE — CONSULTS
"General Surgery Consultation  Jose Bailey MRN# [id number]   Age/Sex: 66 year old male YOB: 1955     Reason for consult: No diagnosis found.        Requesting physician: Dr. Braxton                   Assessment and Plan:   Assessment:  SBO    Plan:  Reviewed findings with him. Gastrografin was given early this AM, tolerating so far with NG clamped. Plan for repeat XR around noon today. If contrast makes it to colon, would be ok to remove NG and start on clear liquids. If not, return NG to LIS and make NPO. No surgical intervention indicated at this time. Will continue to follow.        Dominick Clark PA-C  Pager - [phone]  Phone - [phone]   General Surgery    I have seen and examined the patient.  I have reviewed and agree with the note written by the NP/PA team member.   History of diverticulitis status post resection in 2007 who was developed incisional hernia after doing strenuous activity such as wood splitting.  Has noted a bulge in his midline incision for several years with intermittent nausea but no emesis.  States he was doing relatively well up until couple days ago when he states he was unable to \"pass or move gas\".  Denies any emesis but has had nausea.  Has no pain at present.  Had bowel movements with successful Gastrografin challenge.  Currently sitting in bed and is hungry.  Abdomen-soft, palpable soft incisional hernia approximately 4 to 5 cm in diameter with palpable loops of small intestine, no peritoneal findings  CT scan-report read.  Unfortunately I do not have the images to review.  Would like to obtain these for preoperative planning    Assessment/plan-small bowel obstruction which appears to have resolved in the setting of a chronic ventral hernia in addition to an 8 cm abdominal aortic aneurysm which apparently has enlarged in between previous CT scans.  -At this point the patient seems to be doing well from a GI standpoint.  We can start him on clear liquids and keep " the NG tube clamped.  If he tolerates this then we can pull NG tube and advance his diet.  I think the more pressing issue is his AAA which has been evaluated by vascular surgery.  There are possible plans for endovascular repair in the future.  This would obviously be to his advantage if he is a candidate.  If he is able to undergo minimally invasive AAA repair then we can discuss robotic approach and abdominal wall reconstruction in the future.  Otherwise, if he is not a candidate for endovascular repair and an open repair is planned then he can have his hernia repair at that time.  -We will continue to follow    Ubaldo Alba D.O. [facility]  [phone]  [facility] Department of Surgery           Chief Complaint:   No chief complaint on file.       History is obtained from the patient    HPI:   Jose Bailey is a 66 year old male who presents with complaints of abdominal bloating and cramping for several days. He presented to an outside ER where CT showed SBO just proximal to his ventral hernia. He was admitted here, a NG was placed and gastrografin challenge was started. He currently feels fine. No pain. No n/v and tolerating NG clamped.    He has had a previous colon resection for diverticulitis and has a known ventral hernia for that has not caused him any issues for the past 12-13 years.          Past Medical History:   No past medical history on file.           Past Surgical History:     Past Surgical History:   Procedure Laterality Date     COLECTOMY       CYSTOSCOPY               Social History:    reports that he has been smoking cigarettes and cigarettes. He has a 60.00 pack-year smoking history. He has never used smokeless tobacco. He reports current alcohol use. He reports that he does not use drugs.           Family History:     Family History   Problem Relation Age of Onset     Cerebrovascular Disease Mother      Aneurysm Mother      No Known Problems Father               Allergies:   No Known Allergies            Medications:     Prior to Admission medications    Medication Sig Start Date End Date Taking? Authorizing Provider   ascorbic acid, vitamin C, (VITAMIN C) 1000 MG tablet Take 2,000 mg by mouth daily  4/4/16  Yes Provider, Historical   atenolol (TENORMIN) 100 MG tablet Take 100 mg by mouth daily  2/8/16  Yes Provider, Historical   b complex vitamins (B COMPLEX 1) tablet Take 1 tablet by mouth daily  4/4/16  Yes Provider, Historical   Charcoal Activated (ACTIVATED CHARCOAL PO) Take 2 capsules by mouth daily   Yes Unknown, Entered By History   cholecalciferol (VITAMIN D3) 125 mcg (5000 units) capsule Take 125 mcg by mouth daily   Yes Unknown, Entered By History   docusate sodium (STOOL SOFTENER) 100 MG capsule Take 100 mg by mouth 2 times daily  4/4/16  Yes Provider, Historical   fish oil-omega-3 fatty acids 1000 MG capsule Take 4 g by mouth 2 times daily   Yes Unknown, Entered By History   Milk Thistle 175 MG CAPS Take 2 capsules by mouth daily   Yes Unknown, Entered By History   Misc Natural Products (URINOZINC PROSTATE PO) Take 1 Tablespoonful by mouth 2 times daily   Yes Unknown, Entered By History   saw palmetto fruit 450 mg cap Take 900 mg by mouth 2 times daily  8/4/15  Yes Provider, Historical   Zinc 100 MG TABS Take 100 mg by mouth daily   Yes Unknown, Entered By History              Review of Systems:   The Review of Systems is negative other than noted in the HPI            Physical Exam:     Patient Vitals for the past 24 hrs:   BP Temp Temp src Pulse Resp SpO2 Height Weight   10/17/21 0738 121/60 98.3  F (36.8  C) Oral 50 16 93 % -- --   10/17/21 0413 117/63 98.5  F (36.9  C) Oral 55 18 93 % -- --   10/16/21 2307 121/66 98.2  F (36.8  C) Oral 65 19 94 % -- --   10/16/21 2205 -- -- -- -- -- 90 % -- --   10/16/21 2140 -- -- -- -- -- (!) 88 % -- --   10/16/21 1959 135/73 98.6  F (37  C) Oral 70 16 93 % 1.829 m (6') 107.5 kg (237 lb)          Intake/Output Summary (Last 24 hours) at 10/17/2021  1012  Last data filed at 10/17/2021 0514  Gross per 24 hour   Intake 935 ml   Output 100 ml   Net 835 ml      Constitutional:   awake, alert, cooperative, no apparent distress, and appears stated age       Eyes:   PERRL, conjunctiva/corneas clear, EOM's intact; no scleral edema or icterus noted        ENT:   Normocephalic, without obvious abnormality, atraumatic, Lips, mucosa, and tongue normal        Hematologic / Lymphatic:   No lymphadenopathy       Lungs:   Normal respiratory effort, no accessory muscle use       Cardiovascular:   Regular rate and rhythm       Abdomen:   Soft, obese, nondistended, ventral hernia in midline soft and reducible, nontender, healed midline incision       Musculoskeletal:   No obvious swelling, bruising or deformity       Skin:   Skin color and texture normal for patient, no rashes or lesions              Data:        Admission on 10/16/2021   Component Date Value     Ventricular Rate 10/16/2021 72      Atrial Rate 10/16/2021 72      RI Interval 10/16/2021 168      QRS Duration 10/16/2021 94      QT 10/16/2021 394      QTc 10/16/2021 431      P Axis 10/16/2021 8      R AXIS 10/16/2021 49      T [place] 10/16/2021 68      Interpretation ECG 10/16/2021                      Value:Sinus rhythm  Normal ECG  No previous ECGs available       Sodium 10/16/2021 138      Potassium 10/16/2021 4.6      Chloride 10/16/2021 104      Carbon Dioxide (CO2) 10/16/2021 23      Anion Gap 10/16/2021 11      Urea Nitrogen 10/16/2021 30*     Creatinine 10/16/2021 1.39*     Calcium 10/16/2021 9.7      Glucose 10/16/2021 109      Alkaline Phosphatase 10/16/2021 75      AST 10/16/2021 18      ALT 10/16/2021 16      Protein Total 10/16/2021 7.0      Albumin 10/16/2021 3.3*     Bilirubin Total 10/16/2021 1.3*     GFR Estimate 10/16/2021 52*     Magnesium 10/16/2021 2.3      SARS CoV2 PCR 10/16/2021 Negative      Sodium 10/17/2021 141      Potassium 10/17/2021 4.3      Chloride 10/17/2021 107      Carbon Dioxide  (CO2) 10/17/2021 28      Anion Gap 10/17/2021 6      Urea Nitrogen 10/17/2021 34*     Creatinine 10/17/2021 1.52*     Calcium 10/17/2021 8.7      Glucose 10/17/2021 94      GFR Estimate 10/17/2021 46*     WBC Count 10/17/2021 6.9      RBC Count 10/17/2021 4.00*     Hemoglobin 10/17/2021 12.7*     Hematocrit 10/17/2021 39.4*     MCV 10/17/2021 99      MCH 10/17/2021 31.8      MCHC 10/17/2021 32.2      RDW 10/17/2021 14.9      Platelet Count 10/17/2021 265      Magnesium 10/17/2021 2.4         Report from outside facility reviewed, will request images be pushed to our system

## 2021-10-17 NOTE — PROGRESS NOTES
"Assessment & Plan   66-year-old male with hypertension, CONNIE, AAA who presented as transfer from [facility] for small bowel obstruction and enlarging AAA.    Principal Problem:    SBO (small bowel obstruction) (H)  Active Problems:    Essential hypertension    Obstructive sleep apnea syndrome    Enlarging abdominal aortic aneurysm (AAA) (H)    THOMPSON (acute kidney injury) (H)    CKD (chronic kidney disease) stage 3, GFR 30-59 ml/min (H)    Tobacco use disorder       Small bowel obstruction  -Patient presented with abdominal pain and bloating but without active vomiting.  CT abdomen and pelvis at [facility] shows high-grade small bowel obstruction with transition point in the upper mid abdomen just proximal to the ventral abdominal hernia.  Abdominal hernia is soft and reducible, SBO does not currently appear to be involved in hernia.  NG tube in place.  Noted that patient has evidently been taking horse ivermectin as well as antibiotics for \" chronic Lyme's disease\".   Maintain NG tube, patient already having BM and small bowel follow through pending.  If SBFT clear will remove NG tube  -N.p.o.  -IVF  -Small bowel follow-through  -Discussed with patient that  and medications are not appropriate for human use.  -Patient will need primary care physician  -General surgery consulted by admitting provider, appreciate recommendations    ?THOMPSON on CKD 3  -Unclear what patient's baseline creatinine is as there are no previous renal functions in chart outside of current encounter.  -IVF  -Avoid nephrotoxins    AAA  -Patient with previously known 5 cm near abdominal aortic artery aneurysm which is now expanded to 8 cm.  Patient will need repeat CTA for further evaluation but will hold off until renal function has stabilized.  -Vascular surgery consulted    HTN  -Atenolol    CONNIE  -CPAP    Tobacco use disorder  -Offered nicotine patch, patient did decline.  Currently 1ppd smoking  -Smoking " cessation counseling    Electrolytes: currently within normal limits  Fluids: NS @ 100 ml/hr  Diet: NPO  VTE prophylaxis: heparin SQ          COVID19 symptom check 10/17/2021  Fevers/Chills/myalgias: NEGATIVE TO ALL  Sick contacts/COVID19 exposure: NEGATIVE TO ALL  Cough/trouble breathing/SOB/sore throat: NEGATIVE TO ALL  Diarrhea: NEGATIVE    Subjective  Cc: abdominal pain    Pt is new to be today.  Personally conducted extensive chart review prior to visit including labs, imaging, past provider notes and interventions.  Patient currently denies any abdominal pain, nausea.  Endorses he had a bowel movement last night and a very loose watery 1 again the morning of 10/17.  Denies chest pain, cough, difficulty breathing, fevers, chills.    Review of Systems: History obtained from the patient  General ROS: negative  for  - chills, fatigue, fever  ENT ROS: negative for - headaches, hearing change, nasal congestion, nasal discharge  Hematological and Lymphatic ROS: negative for - bleeding problems, blood clots, bruising  Respiratory ROS: negative for - cough, pleuritic pain, shortness of breath  Cardiovascular ROS: negative for - chest pain, dyspnea on exertion, edema  Gastrointestinal ROS: negative for - abdominal pain, constipation, diarrhea, and nausea/vomiting  Genito-Urinary ROS: negative for - dysuria, hematuria  Musculoskeletal ROS: negative for - gait disturbance and muscle pain  Neurological ROS: negative for - behavioral changes, confusion, dizziness, numbness/tingling   Dermatological ROS: negative for pruritus, rash    Objective    Physical Examination:   General appearance - alert, well appearing, and in no distress and oriented to person, place, and time  Mental status - alert, oriented to person, place, and time, normal mood, behavior, speech, dress, motor activity, and thought processes  HEENT - sclera anicteric, left eye normal, right eye normal, nares normal and patent, no erythema, NG tube in place  with clear liquid draining  Lymphatics - no palpable lymphadenopathy, no hepatosplenomegaly  Respiratory - clear to auscultation, no wheezes, rales or rhonchi, symmetric air entry, no tachypnea, retractions or cyanosis  Cardiac - normal rate, regular rhythm, normal S1, S2, no murmurs, rubs, clicks or gallops, no JVD  Abdomen - soft, nontender, nondistended, soft and reducible mass located just to the right of the umbilcus, bowel sounds present in all 4 quadrants, no bladder distension noted  Neurological - alert, oriented, normal speech, no focal findings or movement disorder noted  Musculoskeletal - no joint tenderness, deformity or swelling, full range of motion without pain  Extremities - peripheral pulses normal, no pedal edema, no clubbing or cyanosis  Skin - normal coloration and turgor, no rashes, no suspicious skin lesions noted    Temp:  [98.2  F (36.8  C)-98.6  F (37  C)] 98.5  F (36.9  C)  Pulse:  [55-70] 55  Resp:  [16-19] 18  BP: (117-135)/(63-73) 117/63  SpO2:  [88 %-94 %] 93 %      Intake/Output Summary (Last 24 hours) at 10/17/2021 0728  Last data filed at 10/17/2021 0514  Gross per 24 hour   Intake 935 ml   Output 100 ml   Net 835 ml       Results    Recent Results (from the past 24 hour(s))   Comprehensive metabolic panel    Collection Time: 10/16/21  8:27 PM   Result Value Ref Range    Sodium 138 136 - 145 mmol/L    Potassium 4.6 3.5 - 5.0 mmol/L    Chloride 104 98 - 107 mmol/L    Carbon Dioxide (CO2) 23 22 - 31 mmol/L    Anion Gap 11 5 - 18 mmol/L    Urea Nitrogen 30 (H) 8 - 22 mg/dL    Creatinine 1.39 (H) 0.70 - 1.30 mg/dL    Calcium 9.7 8.5 - 10.5 mg/dL    Glucose 109 70 - 125 mg/dL    Alkaline Phosphatase 75 45 - 120 U/L    AST 18 0 - 40 U/L    ALT 16 0 - 45 U/L    Protein Total 7.0 6.0 - 8.0 g/dL    Albumin 3.3 (L) 3.5 - 5.0 g/dL    Bilirubin Total 1.3 (H) 0.0 - 1.0 mg/dL    GFR Estimate 52 (L) >60 mL/min/1.73m2   Magnesium    Collection Time: 10/16/21  8:27 PM   Result Value Ref Range     Magnesium 2.3 1.8 - 2.6 mg/dL   ECG 12-lead with MUSE (LHE)    Collection Time: 10/16/21  8:28 PM   Result Value Ref Range    Systolic Blood Pressure  mmHg    Diastolic Blood Pressure  mmHg    Ventricular Rate 72 BPM    Atrial Rate 72 BPM    IA Interval 168 ms    QRS Duration 94 ms     ms    QTc 431 ms    P Axis 8 degrees    R AXIS 49 degrees    T Axis 68 degrees    Interpretation ECG       Sinus rhythm  Normal ECG  No previous ECGs available     Asymptomatic COVID-19 Virus (Coronavirus) by PCR Nasopharyngeal    Collection Time: 10/16/21  9:04 PM    Specimen: Nasopharyngeal; Swab   Result Value Ref Range    SARS CoV2 PCR Negative Negative          Lab Results personally reviewed 10/17  Imaging Results personally reviewed 10/17  Discussed with patient  Reviewed old records     Advanced Care Planning  Discharge Planning discussed with patient  Discussed care with for 35 minutes with greater than 50% of time spent in counseling and coordination of care.  Including small bowel follow-through, repeat imaging if needed, AAA intervention and diagnosis, renal function findings.        This note was created using dragon dictation, any spelling and grammatical errors are unintentional.

## 2021-10-17 NOTE — PROVIDER NOTIFICATION
Text page sent to Dr. Cui regarding pt's HR being irregular and bradycardic, with occasional PVCs.

## 2021-10-17 NOTE — PLAN OF CARE
Problem: Nausea and Vomiting (Intestinal Obstruction)  Goal: Nausea and Vomiting Relief  Outcome: Improving   NG to LIS. Green drainage. Output of 100ml this evening.  NPO.      Problem: Pain (Intestinal Obstruction)  Goal: Acceptable Pain Control  Outcome: Improving   Slight discomfort in abdomen, bloating. Hernia present. BS hypoactive. No flatus per pt. Had 1 bm this AM.    O2 sats occasionally drops to 88% on RA. Has oxygen PRN. MD aware.     Pt refused heparin.    SBA in room with IV pole and NG tube, independent at baseline.   [facility] called in Surgery and Vascular consult this evening.

## 2021-10-17 NOTE — PLAN OF CARE
NG clamped, pt tolerating and denies any pain or nausea. Follow-up x-ray after gastrograffin done, results still pending. CT abd/pelvis ordered to possibly be done tomorrow if creatinine has normalized.     Pt starting to feel some gas pains. Had liquid BM this morning. Ambulating hallway. Atenolol held this morning per parameters. VSS,will continue to monitor.

## 2021-10-18 ENCOUNTER — APPOINTMENT (OUTPATIENT)
Dept: CT IMAGING | Facility: HOSPITAL | Age: 66
DRG: 395 | End: 2021-10-18
Attending: SURGERY
Payer: COMMERCIAL

## 2021-10-18 VITALS
DIASTOLIC BLOOD PRESSURE: 80 MMHG | HEART RATE: 56 BPM | SYSTOLIC BLOOD PRESSURE: 166 MMHG | TEMPERATURE: 98.2 F | OXYGEN SATURATION: 95 % | BODY MASS INDEX: 31.94 KG/M2 | RESPIRATION RATE: 20 BRPM | WEIGHT: 235.8 LBS | HEIGHT: 72 IN

## 2021-10-18 LAB
ANION GAP SERPL CALCULATED.3IONS-SCNC: 5 MMOL/L (ref 5–18)
BUN SERPL-MCNC: 25 MG/DL (ref 8–22)
CALCIUM SERPL-MCNC: 8.8 MG/DL (ref 8.5–10.5)
CHLORIDE BLD-SCNC: 112 MMOL/L (ref 98–107)
CO2 SERPL-SCNC: 26 MMOL/L (ref 22–31)
CREAT SERPL-MCNC: 1.09 MG/DL (ref 0.7–1.3)
ERYTHROCYTE [DISTWIDTH] IN BLOOD BY AUTOMATED COUNT: 14.8 % (ref 10–15)
GFR SERPL CREATININE-BSD FRML MDRD: 69 ML/MIN/1.73M2
GLUCOSE BLD-MCNC: 85 MG/DL (ref 70–125)
HCT VFR BLD AUTO: 41 % (ref 40–53)
HGB BLD-MCNC: 13.2 G/DL (ref 13.3–17.7)
MAGNESIUM SERPL-MCNC: 2.1 MG/DL (ref 1.8–2.6)
MCH RBC QN AUTO: 32.1 PG (ref 26.5–33)
MCHC RBC AUTO-ENTMCNC: 32.2 G/DL (ref 31.5–36.5)
MCV RBC AUTO: 100 FL (ref 78–100)
PLATELET # BLD AUTO: 270 10E3/UL (ref 150–450)
POTASSIUM BLD-SCNC: 4.1 MMOL/L (ref 3.5–5)
RBC # BLD AUTO: 4.11 10E6/UL (ref 4.4–5.9)
SODIUM SERPL-SCNC: 143 MMOL/L (ref 136–145)
WBC # BLD AUTO: 8 10E3/UL (ref 4–11)

## 2021-10-18 PROCEDURE — 83735 ASSAY OF MAGNESIUM: CPT | Performed by: FAMILY MEDICINE

## 2021-10-18 PROCEDURE — 250N000013 HC RX MED GY IP 250 OP 250 PS 637: Performed by: FAMILY MEDICINE

## 2021-10-18 PROCEDURE — 80048 BASIC METABOLIC PNL TOTAL CA: CPT | Performed by: HOSPITALIST

## 2021-10-18 PROCEDURE — 250N000011 HC RX IP 250 OP 636: Performed by: MASSAGE THERAPIST

## 2021-10-18 PROCEDURE — 71275 CT ANGIOGRAPHY CHEST: CPT

## 2021-10-18 PROCEDURE — 250N000011 HC RX IP 250 OP 636: Performed by: FAMILY MEDICINE

## 2021-10-18 PROCEDURE — 99239 HOSP IP/OBS DSCHRG MGMT >30: CPT | Performed by: FAMILY MEDICINE

## 2021-10-18 PROCEDURE — 36415 COLL VENOUS BLD VENIPUNCTURE: CPT | Performed by: HOSPITALIST

## 2021-10-18 PROCEDURE — 99231 SBSQ HOSP IP/OBS SF/LOW 25: CPT | Performed by: SURGERY

## 2021-10-18 PROCEDURE — 85014 HEMATOCRIT: CPT | Performed by: HOSPITALIST

## 2021-10-18 PROCEDURE — 258N000003 HC RX IP 258 OP 636: Performed by: HOSPITALIST

## 2021-10-18 RX ORDER — NICOTINE 21 MG/24HR
1 PATCH, TRANSDERMAL 24 HOURS TRANSDERMAL EVERY 24 HOURS
Qty: 28 PATCH | Refills: 0 | Status: SHIPPED | OUTPATIENT
Start: 2021-10-18 | End: 2021-11-16

## 2021-10-18 RX ORDER — LOSARTAN POTASSIUM 25 MG/1
25 TABLET ORAL DAILY
Status: DISCONTINUED | OUTPATIENT
Start: 2021-10-18 | End: 2021-10-18

## 2021-10-18 RX ORDER — HYDROCHLOROTHIAZIDE 25 MG/1
25 TABLET ORAL DAILY
Qty: 30 TABLET | Refills: 0 | Status: SHIPPED | OUTPATIENT
Start: 2021-10-19 | End: 2021-11-16

## 2021-10-18 RX ORDER — HYDROCHLOROTHIAZIDE 25 MG/1
25 TABLET ORAL DAILY
Status: DISCONTINUED | OUTPATIENT
Start: 2021-10-18 | End: 2021-10-18 | Stop reason: HOSPADM

## 2021-10-18 RX ORDER — ACETAMINOPHEN 325 MG/1
650 TABLET ORAL EVERY 6 HOURS PRN
COMMUNITY
Start: 2021-10-18 | End: 2021-11-16

## 2021-10-18 RX ORDER — ATENOLOL 25 MG/1
100 TABLET ORAL DAILY
Status: DISCONTINUED | OUTPATIENT
Start: 2021-10-18 | End: 2021-10-18 | Stop reason: HOSPADM

## 2021-10-18 RX ORDER — IOPAMIDOL 755 MG/ML
100 INJECTION, SOLUTION INTRAVASCULAR ONCE
Status: COMPLETED | OUTPATIENT
Start: 2021-10-18 | End: 2021-10-18

## 2021-10-18 RX ADMIN — SODIUM CHLORIDE: 9 INJECTION, SOLUTION INTRAVENOUS at 00:54

## 2021-10-18 RX ADMIN — IOPAMIDOL 100 ML: 755 INJECTION, SOLUTION INTRAVENOUS at 12:53

## 2021-10-18 RX ADMIN — DICLOFENAC SODIUM 2 G: 10 GEL TOPICAL at 16:04

## 2021-10-18 RX ADMIN — HYDRALAZINE HYDROCHLORIDE 10 MG: 20 INJECTION INTRAMUSCULAR; INTRAVENOUS at 00:49

## 2021-10-18 RX ADMIN — HYDRALAZINE HYDROCHLORIDE 10 MG: 20 INJECTION INTRAMUSCULAR; INTRAVENOUS at 14:49

## 2021-10-18 RX ADMIN — ATENOLOL 100 MG: 25 TABLET ORAL at 08:06

## 2021-10-18 RX ADMIN — HYDROCHLOROTHIAZIDE 25 MG: 25 TABLET ORAL at 12:54

## 2021-10-18 ASSESSMENT — ACTIVITIES OF DAILY LIVING (ADL)
ADLS_ACUITY_SCORE: 6
ADLS_ACUITY_SCORE: 4
ADLS_ACUITY_SCORE: 4
ADLS_ACUITY_SCORE: 6
ADLS_ACUITY_SCORE: 4
ADLS_ACUITY_SCORE: 4

## 2021-10-18 NOTE — DISCHARGE SUMMARY
[facility] MEDICINE  DISCHARGE SUMMARY  [facility]     Primary Care Physician: No Ref-Primary, Physician  Admission Date: 10/16/2021   Discharge Provider: Marya Nickerson DO Discharge Date: 10/18/2021   Diet: cardiac, low fiber   Code Status: Full Code   Activity: as tolerated        Condition at Discharge: stable      REASON FOR PRESENTATION(See Admission Note for Details)   66-year-old male with hypertension, CONNIE, AAA who presented as transfer from [facility] for small bowel obstruction and enlarging AAA.    PRINCIPAL & ACTIVE DISCHARGE DIAGNOSES     Principal Problem:    SBO (small bowel obstruction) (H)  Active Problems:    Essential hypertension    Obstructive sleep apnea syndrome    Enlarging abdominal aortic aneurysm (AAA) (H)    THOMPSON (acute kidney injury) (H)    CKD (chronic kidney disease) stage 3, GFR 30-59 ml/min (H)    Tobacco use disorder      SIGNIFICANT FINDINGS (Imaging, labs):   IMPRESSION:   1.  High-grade small bowel obstruction with the transition point in the upper mid abdomen, just proximal to the ventral abdominal hernia.   2.  An 8.0 cm abdominal aortic artery aneurysm, previously 5.0 cm. Recommend vascular surgery consultation.     IMPRESSION: No focal consolidation or pleural effusion. The cardiac silhouette is enlarged. Mild pulmonary vascular congestion. An enteric tube extends into the stomach with the proximal port below the gastroesophageal junction.     Impression:     IMPRESSION: Enteric tube extending below level of the the EG junction curled in the upper stomach. Large amount of gas throughout the colon.      Impression:     IMPRESSION:   1.  Findings of a partial small bowel obstruction as noted above.     NOTE: ABNORMAL REPORT     THE DICTATION ABOVE DESCRIBES AN ABNORMALITY FOR WHICH FOLLOW-UP IS NEEDED.        PENDING LABS     [unfilled]    CTA AORTA RESULTS      PROCEDURES ( this hospitalization only)           RECOMMENDATIONS TO OUTPATIENT PROVIDER FOR F/U VISIT     Follow up with vascular surgery outpatient for AAA   Follow up with general surgery outpatient for ventral hernia repair  Should have blood pressure closely followed.  Was hypertensive during hospital stay and hydrochlorothiazide added to his atenolol, likely will need 3rd agen  Had ordered R shoulder xray but was not complete prior to discharge to evaluated DJD    Would spent some time educating patient on appropriate medication uses.  Per outside records he had been using  ivermectin and some other questionable remedies to help himself have a bowel movement.    DISPOSITION     Home    SUMMARY OF HOSPITAL COURSE:      Small bowel obstruction  Patient presented with abdominal pain and bloating but without active vomiting.  CT abdomen and pelvis at [facility] shows high-grade small bowel obstruction with transition point in the upper mid abdomen just proximal to the ventral abdominal hernia.  NG tube was placed at [facility] and small bowel obstruction appeared to resolve with next 24 hours.  Small bowel follow-through performed 10/17 with contrast in the colon, NG tube removed at that time and patient tolerating low fiber diet by discharge.  Patient was followed by general surgery during hospital stay.    ?THOMPSON on CKD 3  Unclear exactly as to what patient's baseline creatinine is as no outside levels available.  Likely with THOMPSON as creatinine did improve to 1.09 from greater than 1.5 with IV fluids.     AAA  Patient with previously known 5 cm near abdominal aortic artery aneurysm which is now expanded to 8 cm.  Vascular surgery consulted and recommended CTA aorta to be completed (imaging pending at discharge).  Will follow up with him outside hospital to schedule endovascular repair.     HTN  Atenolol 100 mg po daily and started HCTZ 25 mg p.o. daily     CONNIE  CPAP     Tobacco use disorder  Offered nicotine patch,  patient did decline.  Currently 1ppd smoking    Right shoulder pain  Patient with ongoing right shoulder pain.  Very active, tenderness along the biceps tendon and also with some supraspinatus pain.  X-ray right shoulder ordered but not completed prior to patient discharging. May complete outpatient.  Patient prescribed Voltaren 4 times daily to the shoulder as well as outpatient physical therapy.      Discharge Medications with Med changes:        Review of your medicines      START taking      Dose / Directions   acetaminophen 325 MG tablet  Commonly known as: TYLENOL      Dose: 650 mg  Take 2 tablets (650 mg) by mouth every 6 hours as needed for mild pain or other (and adjunct with moderate or severe pain or per patient request)  Refills: 0     diclofenac 1 % topical gel  Commonly known as: VOLTAREN  Used for: Biceps tendonitis, right      Dose: 2 g  Apply 2 g topically 4 times daily Apply to right shoulder  Quantity: 50 g  Refills: 0     hydrochlorothiazide 25 MG tablet  Commonly known as: HYDRODIURIL  Used for: Essential hypertension      Dose: 25 mg  Start taking on: October 19, 2021  Take 1 tablet (25 mg) by mouth daily  Quantity: 30 tablet  Refills: 0     nicotine 21 MG/24HR 24 hr patch  Commonly known as: NICODERM CQ      Dose: 1 patch  Place 1 patch onto the skin every 24 hours  Quantity: 28 patch  Refills: 0        CONTINUE these medicines which have NOT CHANGED      Dose / Directions   ACTIVATED CHARCOAL PO      Dose: 2 capsule  Take 2 capsules by mouth daily  Refills: 0     ascorbic acid 1000 MG Tabs tablet      Dose: 2,000 mg  Take 2,000 mg by mouth daily  Refills: 0     atenolol 100 MG tablet  Commonly known as: TENORMIN      Dose: 100 mg  Take 100 mg by mouth daily  Refills: 0     B Complex Formula 1 Tabs      Dose: 1 tablet  Take 1 tablet by mouth daily  Refills: 0     cholecalciferol 125 mcg (5000 units) capsule  Commonly known as: VITAMIN D3      Dose: 125 mcg  Take 125 mcg by mouth  daily  Refills: 0     docusate sodium 100 MG capsule  Commonly known as: COLACE      Dose: 100 mg  Take 100 mg by mouth 2 times daily  Refills: 0     fish oil-omega-3 fatty acids 1000 MG capsule      Dose: 4 g  Take 4 g by mouth 2 times daily  Refills: 0     Milk Thistle 175 MG Caps      Dose: 2 capsule  Take 2 capsules by mouth daily  Refills: 0     saw palmetto 450 MG Caps capsule      Dose: 900 mg  Take 900 mg by mouth 2 times daily  Refills: 0     URINOZINC PROSTATE PO      Dose: 1 Tablespoonful  Take 1 Tablespoonful by mouth 2 times daily  Refills: 0     Zinc 100 MG Tabs      Dose: 100 mg  Take 100 mg by mouth daily  Refills: 0           Where to get your medicines      These medications were sent to [facility] [address] [address]    Phone: [phone]   diclofenac 1 % topical gel  hydrochlorothiazide 25 MG tablet  nicotine 21 MG/24HR 24 hr patch     Some of these will need a paper prescription and others can be bought over the counter. Ask your nurse if you have questions.    You don't need a prescription for these medications  acetaminophen 325 MG tablet             Rationale for medication changes:      HCTZ added for better blood pressure control        Consults   Vascular surgery, general surgery      Immunizations given this encounter     [unfilled]       Anticoagulation Information      Recent INR results: No results for input(s): INR in the last 168 hours.      Discharge Orders     No discharge procedures on file.  Examination     Vital signs:  Temp: 97.5  F (36.4  C) Temp src: Temporal BP: (!) 175/81 (RN Notified) Pulse: 55   Resp: 18 SpO2: 96 % O2 Device: None (Room air) Oxygen Delivery: 1 LPM Height: 182.9 cm (6') Weight: 107 kg (235 lb 12.8 oz)  Estimated body mass index is 31.98 kg/m  as calculated from the following:    Height as of this encounter: 1.829 m (6').    Weight as of this encounter:  107 kg (235 lb 12.8 oz).         Physical Examination:   General appearance - alert, well appearing, and in no distress and oriented to person, place, and time  Mental status - alert, oriented to person, place, and time, normal mood, behavior, speech, dress, motor activity, and thought processes  HEENT - sclera anicteric, left eye normal, right eye normal, nares normal and patent, no erythema  Lymphatics - no palpable lymphadenopathy, no hepatosplenomegaly  Respiratory - clear to auscultation, no wheezes, rales or rhonchi, symmetric air entry, no tachypnea, retractions or cyanosis  Cardiac - normal rate, regular rhythm, normal S1, S2, no murmurs, rubs, clicks or gallops, no JVD  Abdomen - soft, nontender, nondistended, soft and reducible mass located just to the right of the umbilcus, bowel sounds present in all 4 quadrants, no bladder distension noted  Neurological - alert, oriented, normal speech, no focal findings or movement disorder noted  Musculoskeletal - non-TTP along the right glenohumeral joint, non-TTP on the right acromioclavicular joint, some mild TTP across the supraspinatus and insertion of the head of biceps tendon.  Positive empty can test, negative crossover test, no pain elicited with supination or pronation of the upper extremity.  Extremities - peripheral pulses normal, no pedal edema, no clubbing or cyanosis  Skin - normal coloration and turgor, no rashes, no suspicious skin lesions noted      Please see EMR for more detailed significant labs, imaging, consultant notes etc.  Total time spent on discharge: 50 minutes    Marya Nickerson DO    [facility]: Ph:[phone]    CC:No Ref-Primary, Physician

## 2021-10-18 NOTE — CONSULTS
Tobacco Treatment Consult  10/18/2021, 2:37 PM    Patient Admitted for: SBO (small bowel obstruction) (H) [K56.609] on 10/16/2021    History of Tobacco Use:   Tobacco Product(s):cigarette  Amount used: 1.5 ppd, recent down to 1 ppd  Number of quit attempts in past: 1  Longest period of without use: 3-4 days  Pharmacotherapy tried in the past: patch  Pharmacotherapy tried that has not been beneficial or was not tolerated: patch  Medical co-morbidities impacting tobacco use: none found in chart review, none brought up in short discussion    Assessment:   Readiness to quit/planning to quit: pre-contemplative  Current major life stressors: experiencing a major health problem  - Jose was open to talking, he has thought about the fact he should quit smoking which has prompted him to cutback on how much he smokes.  He states though he is not ready to put forth the effort needed to quit.  He was open to taking information on cessation.    Education done during visit:  -Issued tobacco cessation materials and resource information.  -Asked him to call if he is interested in quitting at some point in the future, has phone number to reach our service.    Recommendations:  -As an inpatient the patient is not interested in trying gum or lozenge, says he is doing okay  -Continued encouragement from health care providers to quit.    Jose will not participate in follow-up calls post-discharge. Will continue to be available to patient throughout hospital stay.    Total 8 minutes spent in smoking cessation, and 25 minutes spent in chart review,care coordination, and documentation.    Sarahy Sousa RT, Chronic Pulmonary Disease Specialist, Tobacco Treatment Specialist  Phone [phone]

## 2021-10-18 NOTE — PROGRESS NOTES
NG tube removed at 0815, pt tolerating full liquids. Denies pain. +BS, having stools. CT abd/pelvis done this afternoon. /81--given scheduled hydrochlorothiazide--recheck of /84 and pt then given IV hydralazine PRN. Will monitor effectiveness.

## 2021-10-18 NOTE — PLAN OF CARE
Problem: Fluid Deficit (Intestinal Obstruction)  Goal: Fluid Balance  Outcome: Improving     Problem: Nausea and Vomiting (Intestinal Obstruction)  Goal: Nausea and Vomiting Relief  Outcome: Improving     Problem: Pain (Intestinal Obstruction)  Goal: Acceptable Pain Control  Outcome: Improving     Pt's NG tube has been clamped the entire shift, denies nausea, pain, and distention/bloating. IVF continues at 100ml/hr NS. He is up independently in his room, steady on his feet. He has active bowel sounds, is passing flatus, and has several loose bowel movements in the last 24 hours.

## 2021-10-18 NOTE — PLAN OF CARE
Patient denied pain. Patient left with all personal belongings. Voltaren cream given to patient; BP medication and voltaren cream from pharmacy handed to patient. Nicotine patches were refused and will be returned to pharmacy. After Discharge Summary discussed with patient and copies handed to patient. Patient verbalized understanding of education given.     Problem: Adult Inpatient Plan of Care  Goal: Plan of Care Review  Outcome: Adequate for Discharge  Goal: Patient-Specific Goal (Individualized)  Outcome: Adequate for Discharge  Goal: Absence of Hospital-Acquired Illness or Injury  Outcome: Adequate for Discharge  Goal: Optimal Comfort and Wellbeing  Outcome: Adequate for Discharge  Goal: Readiness for Transition of Care  Outcome: Adequate for Discharge     Problem: Fluid Deficit (Intestinal Obstruction)  Goal: Fluid Balance  Outcome: Adequate for Discharge     Problem: Infection (Intestinal Obstruction)  Goal: Absence of Infection Signs and Symptoms  Outcome: Adequate for Discharge     Problem: Nausea and Vomiting (Intestinal Obstruction)  Goal: Nausea and Vomiting Relief  Outcome: Adequate for Discharge     Problem: Pain (Intestinal Obstruction)  Goal: Acceptable Pain Control  Outcome: Adequate for Discharge

## 2021-10-18 NOTE — PROGRESS NOTES
General Surgery Progress Note  Hospital Day # 2    Subjective:   CC: Small bowel obstruction  Status: Feeling better with NG tube out.  Having bowel movements passing flatus    Vitals:    10/18/21 0044 10/18/21 0200 10/18/21 0442 10/18/21 0735   BP: (!) 160/77 135/68 (!) 163/78 (!) 180/91   BP Location:   Right arm Right arm   Pulse:  63 71 56   Resp:   18 18   Temp:   97.8  F (36.6  C) 98.2  F (36.8  C)   TempSrc:   Oral Temporal   SpO2:  92% 91% 94%   Weight:       Height:           Physical Exam:  General: NAD, pleasant  ABD: Soft, ventral hernia palpable but reducible, nontender  EXT:no CCE    Recent Labs   Lab 10/18/21  0805   WBC 8.0   HGB 13.2*   HCT 41.0          Recent Labs   Lab 10/18/21  0805 10/17/21  0722 10/16/21  2027      < > 138   CO2 26   < > 23   BUN 25*   < > 30*   ALBUMIN  --   --  3.3*   ALKPHOS  --   --  75   ALT  --   --  16   AST  --   --  18    < > = values in this interval not displayed.       Assessment: Mild obstruction with ventral hernia    Plan: Continue with diet as tolerated.  -Patient awaiting continued vascular work-up  -No plans for surgery to repair his hernia at this point  -We will likely discuss surgical options as an outpatient    Ubaldo Alba [facility]  [phone]

## 2021-10-19 DIAGNOSIS — I10 ESSENTIAL HYPERTENSION: Primary | ICD-10-CM

## 2021-10-19 DIAGNOSIS — R06.02 SHORTNESS OF BREATH: ICD-10-CM

## 2021-10-20 ENCOUNTER — HOSPITAL ENCOUNTER (OUTPATIENT)
Dept: NUCLEAR MEDICINE | Facility: HOSPITAL | Age: 66
End: 2021-10-20
Attending: SURGERY
Payer: COMMERCIAL

## 2021-10-20 DIAGNOSIS — R06.02 SHORTNESS OF BREATH: ICD-10-CM

## 2021-10-20 DIAGNOSIS — I10 ESSENTIAL HYPERTENSION: ICD-10-CM

## 2021-10-20 PROCEDURE — 78452 HT MUSCLE IMAGE SPECT MULT: CPT

## 2021-10-20 PROCEDURE — 93018 CV STRESS TEST I&R ONLY: CPT | Performed by: INTERNAL MEDICINE

## 2021-10-20 PROCEDURE — 78452 HT MUSCLE IMAGE SPECT MULT: CPT | Mod: 26 | Performed by: INTERNAL MEDICINE

## 2021-10-20 PROCEDURE — A9500 TC99M SESTAMIBI: HCPCS | Performed by: SURGERY

## 2021-10-20 PROCEDURE — 343N000001 HC RX 343: Performed by: SURGERY

## 2021-10-20 RX ADMIN — Medication 8.8 MCI.: at 12:02

## 2021-10-21 ENCOUNTER — HOSPITAL ENCOUNTER (OUTPATIENT)
Dept: NUCLEAR MEDICINE | Facility: HOSPITAL | Age: 66
End: 2021-10-21
Attending: SURGERY
Payer: COMMERCIAL

## 2021-10-21 ENCOUNTER — HOSPITAL ENCOUNTER (OUTPATIENT)
Dept: CARDIOLOGY | Facility: HOSPITAL | Age: 66
End: 2021-10-21
Attending: SURGERY
Payer: COMMERCIAL

## 2021-10-21 DIAGNOSIS — I10 ESSENTIAL HYPERTENSION: Primary | ICD-10-CM

## 2021-10-21 LAB
CV STRESS CURRENT BP HE: NORMAL
CV STRESS CURRENT HR HE: 49
CV STRESS CURRENT HR HE: 49
CV STRESS CURRENT HR HE: 52
CV STRESS CURRENT HR HE: 53
CV STRESS CURRENT HR HE: 55
CV STRESS CURRENT HR HE: 55
CV STRESS CURRENT HR HE: 74
CV STRESS CURRENT HR HE: 75
CV STRESS CURRENT HR HE: 76
CV STRESS CURRENT HR HE: 77
CV STRESS CURRENT HR HE: 77
CV STRESS CURRENT HR HE: 78
CV STRESS DEVIATION TIME HE: NORMAL
CV STRESS ECHO PERCENT HR HE: NORMAL
CV STRESS EXERCISE STAGE HE: NORMAL
CV STRESS FINAL RESTING BP HE: NORMAL
CV STRESS FINAL RESTING HR HE: 76
CV STRESS MAX HR HE: 79
CV STRESS MAX TREADMILL GRADE HE: 0
CV STRESS MAX TREADMILL SPEED HE: 0
CV STRESS PEAK DIA BP HE: NORMAL
CV STRESS PEAK SYS BP HE: NORMAL
CV STRESS PHASE HE: NORMAL
CV STRESS PROTOCOL HE: NORMAL
CV STRESS RESTING PT POSITION HE: NORMAL
CV STRESS ST DEVIATION AMOUNT HE: NORMAL
CV STRESS ST DEVIATION ELEVATION HE: NORMAL
CV STRESS ST EVELATION AMOUNT HE: NORMAL
CV STRESS TEST TYPE HE: NORMAL
CV STRESS TOTAL STAGE TIME MIN 1 HE: NORMAL
NUC STRESS EJECTION FRACTION: 45 %
STRESS ECHO BASELINE DIASTOLIC HE: 82
STRESS ECHO BASELINE HR: 50
STRESS ECHO BASELINE SYSTOLIC BP: 157
STRESS ECHO LAST STRESS DIASTOLIC BP: 64
STRESS ECHO LAST STRESS HR: 76
STRESS ECHO LAST STRESS SYSTOLIC BP: 140
STRESS ECHO TARGET HR: 152

## 2021-10-21 PROCEDURE — A9500 TC99M SESTAMIBI: HCPCS | Performed by: SURGERY

## 2021-10-21 PROCEDURE — 93017 CV STRESS TEST TRACING ONLY: CPT | Performed by: INTERNAL MEDICINE

## 2021-10-21 PROCEDURE — 343N000001 HC RX 343: Performed by: SURGERY

## 2021-10-21 PROCEDURE — 250N000011 HC RX IP 250 OP 636: Performed by: SURGERY

## 2021-10-21 PROCEDURE — 93016 CV STRESS TEST SUPVJ ONLY: CPT | Performed by: INTERNAL MEDICINE

## 2021-10-21 RX ORDER — ALBUTEROL SULFATE 0.83 MG/ML
2.5 SOLUTION RESPIRATORY (INHALATION)
Status: DISCONTINUED | OUTPATIENT
Start: 2021-10-21 | End: 2021-10-21 | Stop reason: HOSPADM

## 2021-10-21 RX ORDER — CAFFEINE 200 MG
200 TABLET ORAL
Status: DISCONTINUED | OUTPATIENT
Start: 2021-10-21 | End: 2021-10-21 | Stop reason: HOSPADM

## 2021-10-21 RX ORDER — CAFFEINE CITRATE 20 MG/ML
60 SOLUTION INTRAVENOUS
Status: DISCONTINUED | OUTPATIENT
Start: 2021-10-21 | End: 2021-10-21 | Stop reason: HOSPADM

## 2021-10-21 RX ORDER — REGADENOSON 0.08 MG/ML
0.4 INJECTION, SOLUTION INTRAVENOUS ONCE
Status: COMPLETED | OUTPATIENT
Start: 2021-10-21 | End: 2021-10-21

## 2021-10-21 RX ORDER — AMINOPHYLLINE 25 MG/ML
50 INJECTION, SOLUTION INTRAVENOUS
Status: DISCONTINUED | OUTPATIENT
Start: 2021-10-21 | End: 2021-10-21 | Stop reason: HOSPADM

## 2021-10-21 RX ADMIN — REGADENOSON 0.4 MG: 0.08 INJECTION, SOLUTION INTRAVENOUS at 12:57

## 2021-10-21 RX ADMIN — Medication 36 MILLICURIE: at 13:00

## 2021-10-25 ENCOUNTER — OFFICE VISIT (OUTPATIENT)
Dept: VASCULAR SURGERY | Facility: CLINIC | Age: 66
End: 2021-10-25
Attending: SURGERY
Payer: COMMERCIAL

## 2021-10-25 ENCOUNTER — TELEPHONE (OUTPATIENT)
Dept: VASCULAR SURGERY | Facility: CLINIC | Age: 66
End: 2021-10-25

## 2021-10-25 VITALS
WEIGHT: 237 LBS | SYSTOLIC BLOOD PRESSURE: 142 MMHG | TEMPERATURE: 97.6 F | BODY MASS INDEX: 32.14 KG/M2 | DIASTOLIC BLOOD PRESSURE: 80 MMHG | HEART RATE: 44 BPM | RESPIRATION RATE: 24 BRPM

## 2021-10-25 DIAGNOSIS — Z71.6 ENCOUNTER FOR SMOKING CESSATION COUNSELING: ICD-10-CM

## 2021-10-25 DIAGNOSIS — I71.40 ENLARGING ABDOMINAL AORTIC ANEURYSM (AAA) (H): ICD-10-CM

## 2021-10-25 DIAGNOSIS — A69.20 LYME DISEASE: Primary | ICD-10-CM

## 2021-10-25 DIAGNOSIS — F17.200 TOBACCO USE DISORDER: ICD-10-CM

## 2021-10-25 PROCEDURE — 99204 OFFICE O/P NEW MOD 45 MIN: CPT | Performed by: SURGERY

## 2021-10-25 PROCEDURE — G0463 HOSPITAL OUTPT CLINIC VISIT: HCPCS

## 2021-10-25 ASSESSMENT — PAIN SCALES - GENERAL: PAINLEVEL: MILD PAIN (2)

## 2021-10-25 NOTE — TELEPHONE ENCOUNTER
Surgery Scheduled      Surgery/Procedure: Open AAA    Special Equipment: cell saver, omni retractor, c arm    Location: [facility]    Date: 12/1/21    Time: 1pm    Surgeon: Dr. Fontanez    OR Confirmed/ :  Yes with [facility] on 10/25/21    Orders In:  Yes    Entered on [facility] / [facility] Calendar:  Yes    Post Op: 12/16/21    Covid Scheduled: Pt will try to schedule at [facility], he is aware that it needs to be 96 hours prior to surgery. They will call back if it can't be scheduled there.    Ultrasound Contacted: NA    Reps Contacted: NA    Blood Thinners Addressed: N/A

## 2021-10-25 NOTE — PROGRESS NOTES
[facility]        Patient is here for a consult to discuss Abdominal aortic aneurysm (AAA).    Pt is currently taking no meds that would impact our treatment plan.    BP (!) 142/80   Pulse (!) 44   Temp 97.6  F (36.4  C) (Oral)   Resp 24   Wt 237 lb (107.5 kg)   BMI 32.14 kg/m      The provider has been notified that the patient has no concerns.     Questions patient would like addressed today are: N/A.    Refills are needed: No    Has homecare services and agency name:  Suzy Macdonald RN

## 2021-10-25 NOTE — Clinical Note
Open AAA scheduled 12/1. Pt needs to find PCP for pre-op, going to schedule COVID at [facility]. Needs to see ID for lyme disease, referral for pulm and PFTs due to smoking.

## 2021-10-25 NOTE — PATIENT INSTRUCTIONS
Jose    Your visit to [facility] for your surgery or procedure is coming soon and we look forward to seeing you! This friendly reminder and pre-procedure checklist will help to ensure your procedure/surgery goes smoothly and meets your expectations. At [facility], our goal is to provide you with a great patient experience and to deliver genuine, professional care to every patient.     Please complete all the steps in advance of your visit. If you have any questions about the items listed below, please give our office a call. We can be reached at [phone] or visit our website at [email or web address]  for more information.       Procedure Date :  12/1/21    Procedure Time :  TBD    Arrival Time: TBD    Covid Test: ORDER PLACED PLEASE CALL [place] TO SCHEDULE ON 11/29/21, NEED 96 HOURS PRIOR TO SURGERY     Post Operative Appointment: 12/16/21    Admission Type: Inpatient    Surgeon: MD Sheryl Fontanez    Procedure: OPEN AAA    Procedure Location: [facility]:  [address] [place] [address] (phone: [phone], Fax: [phone])    If you take blood thinners: SEE SPECIFIC INSTRUCTIONS BELOW    PLEASE DO NOT STOP YOUR ASPIRIN OR PLAVIX UNLESS SPECIFICALLY DIRECTED BY THE VASCULAR SURGEON TO STOP!  - In most cases Vascular surgeons want you to continue these. This is different from most NON vascular surgeries and may not be well known by your Primary Care Provider      IF YOU NEED TO RESCHEDULE OR CANCEL YOUR PROCEDURE FOR ANY REASON PLEASE CALL THE CLINIC AS SOON AS POSSIBLE [phone].    Complete the following checklist before your procedure/surgery    [] Contact your insurance regarding coverage    If you would like a Good [facility]onda Estimate for your upcoming service/procedure at [facility] Location contact Cost of Care Estimates at [phone], advocates are available Monday through Friday 8am -  "5pm.   You may also submit a request online at [email or web address]   - complete the secure online form found under \"Services and Procedure Pricing\"     If your procedure is at [facility] please contact the numbers below for Cost of Care Estimates.   Facility Charge: [phone]    Anesthesiology charge:  [phone]        []  You will need a preop physical within 30 days before surgery with your primary care provider. This exam is required by the anesthesiologist to ensure a safe surgical experience.    Failure  to obtain your pre-op physical will lead to cancellation of your surgery  Please contact your primary to schedule. Please ensure your Preoperative Physical is faxed to the Hospital (numbers listed above)    [] Preoperative Medication Instructions    Stop Ibuprofen 1 week prior to surgery.      Contact your prescribing provider for instructions before discontinuing any medications including anticoagulants. (Examples: Coumadin, Heparin, Xarelto, Eliquis, Pradaxa, Effiient, Briliant) We would like you stop them five days before surgery HOWEVER, please follow the advice of your primary care provider. Most surgeons will have you remain on your aspirin and Plavix.    Hold Herbal Supplements and Vitamin E 7 days before    Stop Cialis, Levitra and Viagra 2-3 days before surgery    [] Fasting Requirements    Do not eat anything after 11:00 pm     You may have clear liquids up to two hours before your arrival time (coffee, tea, water, or Gatorade. No cream or milk)    If your surgery is scheduled later in the day, fasting instructions may be modified.    If your primary care provider has instructed you to continue oral medications, you may take them on the morning of surgery with a small sip of water.      No alcohol or smoking after 12:00am the day of surgery    Day Before Surgery    [] A surgery nurse will call you 2 -3 days prior to your surgery to review your health history and " provide detailed instructions. You will be given an arrival time based on your      scheduled surgery.    [] STOP Smoking and Drinking: It is important to stop smoking and drinking at least 24 hours before surgery. Smoking and drinking may cause complications in your recovery from anesthesia and may lengthen the healing process.    [] Pack for your hospital stay: If it will be required for you to stay at the hospital after surgery, bring personal items such as a robe, slippers, pajamas, additional clothing and toiletries. Don't forget:    List of medication    Eyeglass case or contact case with solution. You cannot wear contacts during surgery    Copies of your physical exam , lab results and EKG    Copy of Health Care Directives, Living Will or Power of     Insurance Cards    Photo ID    CPAP machine    [] NOTHING BY MOUTH 8 HOURS BEFORE. This includes gum, hard candy, water and mints. The only exception is if you have been instructed by your doctor to take your medications with sips of water. You may rinse your mouth or brush your teeth, but do not swallow water.        Day of Surgery    [] Medications- Take as indicated with sips of water     [] Wear comfortable loose fitting clothes. Wear your glasses-Not contacts. Do not wear jewelry and remove body piercing's. Surgery may be cancelled if they are not removed.     [] If same day surgery-Have a someone come with you to surgery that can help you understand the surgeon's instructions, drive you home and stay with you overnight the first night.   A nurse will call you at home within 72 hours following surgery to see how you are doing. Our nurses and doctors will discuss recovery with you.    [] DO NOT BRING FMLA WITH TO SURGERY.  These should be sent to the provider's office by fax to [phone]        Notify our office right away, if you have any changes in your health status, or if you develop a cold, flu, diarrhea, infection, fever or sore throat  before your scheduled surgery date. We can be reached at [phone] Monday-Friday 8 am-4:30 pm if you have any questions.   Thank you for choosing [facility]  If you have any questions or need to reschedule your appointment, please call [phone]        Before Your Procedure or Hospital Admission  Testing for COVID-19 (Coronavirus)    Thank you for choosing [facility] for your health care needs. The COVID-19 pandemic is a very challenging time for everyone. Our goal is to keep you and our team here at [facility] safe and healthy.       Before you come in, All patients must get tested for COVID-19. Your test needs to happen 2 to 4 days before you check in to the hospital or surgery site.    A clinic scheduler will call about a week in advance to set up a testing time at one of our labs. We ll take a swab of your nose or throat.    Note: If you go to a clinic or pharmacy like [facility] or [facility] for your test, make sure you get a test inside the nose. Tests inside the nose are:  - A naso-pharyngeal (NP) RT-PCR test  - An anterior nares RT-PCR test    Do NOT get a  rapid  test or a saliva (spit) test.    After the test, please stay at home and stay out of contact with other people. It will be harder for you to recover if you get COVID-19 before your treatment.    Please follow all current safety guidelines, including:    Limit trips outside your home.    Limit the number of people you see.    Always wear a mask outside your home.    Use social distancing. Stay 6 feet away from others whenever you can.    Wash your hands often.    If your test shows you have COVID-19  If your test is positive, we ll let you know. A positive test means that you have the virus.  We ll probably have to postpone your admission, surgery or procedure. Your doctor will discuss this with you. After that, we ll let you know what to do and when you can re-schedule.  We may need to cancel your treatment on short  notice for other reasons, too.    If your test shows you DON T have COVID-19  Even if your test is negative, you can still get COVID-19. It s rare but, sometimes, the test result is wrong. You could also catch the virus after taking the test.  There s a very small chance that you could catch COVID-19 in the hospital or surgery center.    Day of your surgery or procedure    Please come wearing a face covering that covers both your nose and mouth.    When you arrive, we ll ask you some questions to find out if you have any signs or symptoms of COVID-19.    Ask your care team if you can have visitors. All visitors must wear face coverings and will be screened for signs of COVID-19.    Even if no visitors are allowed, you can still have with you:  - Your legal guardian or legal decision maker  - A parent and one other visitor, if you are  younger than 18 years old  - A partner and a , if you are in labor    We might need to teach you about taking care of yourself after surgery. If so, a visitor can come into the hospital to learn about it, too.    The rules for visitors change often, depending on how much the virus is spreading. To learn more, see Visiting a Loved One in the Hospital during the COVID-19 Outbreak. Please call your care team, hospital or surgery center if you have any questions. We thank you for your understanding and for choosing [facility] for your care.    Questions and Answers  Does it matter where I get tested for COVID-19?  Yes. We urge you to get tested at one of our [facility] COVID-19 testing sites. We process these tests in our lab and can get the results quickly. Your [facility] care team needs to get your results before you check in.    What should I do if I can t get tested at [facility]?  You can get tested somewhere else, but you ll need to take these extra steps:  1. Contact your family doctor or clinic to arrange your test.  2. Take the test within 4 days  of your surgery or procedure. We can t accept tests older than 4 days.  3. Make sure you re getting a test inside the nose.  Tests inside the nose are:  - A naso-pharyngeal (NP) RT-PCR test  - An anterior nares RT-PCR test  -Many pharmacies use  rapid  tests or saliva (spit) tests. We do NOT accept those tests before surgery or procedures. Tests from inside the nose are the most accurate tests.  4. Make sure your doctor or clinic faxes your results to [facility] at [phone].    If we don t get your results in time, we may have to  delay or cancel your treatment.      For informational purposes only. Not to replace the advice of your health care provider. Copyright   2020 [employer]. All rights reserved. Clinically reviewed by Infection Prevention and the [facility] COVID-19 Clinical Team.  Astrid 438573 - Rev 09/09/21.     Patient Education     Surgery for Abdominal Aortic Aneurysm  During surgery for abdominal aortic aneurysm (AAA), the weakened aortic wall is replaced with a hollow man made tube (graft).  Reaching the aneurysm  The aorta can be reached through open surgery . Or a less invasive endovascular procedure may be done. Your surgeon will choose the best approach for you.  Open surgery  An incision is made in your abdomen. Once inside, your surgeon gently moves aside your organs to reach the damaged section of the aorta.  Endovascular procedure  Near your groin, the surgeon makes 2 small cuts (incisions). Then he or she threads a thin, flexible tube (catheter) into the artery at the incision. The surgeon places a graft inside the catheter and guides it toward the damaged part of the aorta.  Placing the graft    The goal is to safely route blood past the aneurysm.  During open surgery  Here is what to expect:    The aneurysm is opened and cleaned of any blood clots.    The graft is sewn to the aorta.    The wall of the aorta is wrapped around the graft to protect it. The  wall is then sewn up.    The incision site is closed with stitches or staples.  During an endovascular procedure  Here is what to expect:    Watching the catheter on a video monitor, the surgeon places a catheter in the best position. This position is confirmed by a dye study (angiogram).     The surgeon guides the graft through the catheter and expands it so blood can flow through it.    The blood is now re-routed through the graft and does not fill the aneurysm anymore.    The graft is attached inside the artery. It's held in place with metal springs (stents), hooks, or pins.    The catheter is removed. The incision sites are closed with stitches or staples.  Risks and possible complications  Here are potential problems to be aware of:     Infection    Blood clots in legs    Bleeding    Kidney failure    Respiratory failure    Injury to the colon s blood supply    Erectile dysfunction    Spinal cord injury    Heart attack, stroke, or death  [employer] last reviewed this educational content on 12/1/2019 2000-2021 [employer]. All rights reserved. This information is not intended as a substitute for professional medical care. Always follow your healthcare professional's instructions.    Patient Education     Having Endovascular Repair of an Abdominal Aortic Aneurysm (AAA)  Endovascular repair is a type of treatment for an abdominal aortic aneurysm (AAA). An AAA is a bulge in the wall of the large artery below your heart. The large artery is called the aorta. The bulge is caused by a weak section in the artery wall. The bulge is at risk of tearing. During the procedure, the weak section of the aorta is treated to stop it from tearing. Smaller, stable AAAs can be watched over time and treated with lifestyle changes and medicines. Other AAAs may require open abdominal surgery, which is more extensive than the endovascular repair.    What to tell your healthcare provider  Tell your healthcare provider  about all the medicines you take. This includes over-the-counter medicines, herbs, and supplements. Let your provider know if you ve ever had a problem with sedation or anesthesia. Also tell him or her if you are pregnant or think you might be pregnant. Tell your provider about any recent changes in your health, such as a fever.   Tests before your procedure  You may need some tests before your procedure, such as:    Blood tests, to check for anemia and infection    An electrocardiogram (ECG), to check your heart rhythm    Ultrasound, to look at the size of your aneurysm    CT scan, to make detailed pictures of your aneurysm    Angiography, to get more information about your aneurysm  Getting ready for your procedure  Talk with your healthcare provider about how to get ready for your surgery. You may need to stop taking some medicines such as blood thinners ahead of time. If you smoke, you ll need to stop before your surgery. Smoking can delay healing. Talk with your healthcare provider if you need help to stop smoking. Follow any instructions from your healthcare provider about not eating or drinking before your surgery.   On the day of your procedure  Endovascular repair is a minimally invasive procedure. This means it's done with a small cut (incision). A vascular surgeon and a team of specialized healthcare providers will do the surgery. Your doctor can explain what to expect for your surgery. This is an example of how it is done:     You will likely be given general anesthesia. This prevents pain and causes you to sleep through the procedure.    A healthcare provider will carefully watch your vital signs, like your heart rate and blood pressure, during the procedure.    The surgeon will make a small cut in your groin and into an artery there. He or she will then insert a thin, flexible tube (catheter) into the artery.    The surgeon will gently guide the tube all the way to the aneurysm. The surgeon will use  moving X-ray pictures to get to the right spot.    A stent graft is sent along the catheter to the aneurysm. The stent is a tube made of a thin metal mesh (the stent), covered with a thin polyester fabric (graft). The tube is collapsed so it's narrow and can fit through your blood vessel.    When the stent reaches the aorta, the surgeon will open it up and fasten it in place. The stent then stays in place, and blood flows through it. It protects that part of the aorta, and prevents the aneurysm from bursting.    Your surgeon will remove the catheter, close the incision, and put a small bandage on the wound.  After your procedure  After the procedure, you will spend several hours in a recovery room. Your healthcare team will closely monitor your vital signs, such as your heart rate, blood pressure, and breathing. To help prevent bleeding, you may need to lie flat for several hours after the procedure. You may need to stay at the hospital for a day or more, depending on your condition. Your healthcare provider will tell you more about what to expect.   You may have some pain after the procedure. You can take pain medicine as advised by your doctor. You can eat a normal diet as soon as you are able.   Recovering at home  You may need to take it easy for a little while after you get home. Ask your doctor if you need to limit your activities. You may need to take medicines to help prevent blood clots.   Follow-up care  Follow up with your healthcare provider, or as advised.  When to call your healthcare provider  Call your healthcare provider right away if you have any of these:    Fever of 100.4 F (38.0 C) or higher, or as directed by your provider    Symptoms that don t get better    Pain that is getting worse    Infection at the incision site    Shortness of breath    New symptoms  [employer] last reviewed this educational content on 8/1/2020 2000-2021 [employer]. All rights reserved. This information  is not intended as a substitute for professional medical care. Always follow your healthcare professional's instructions.    Patient Education     After Open Abdominal Aortic Aneurysm Surgery  You have had surgery to repair an abdominal aortic aneurysm (AAA). This happens when the main blood vessel in your abdominal area weakens and expands like a balloon. Your healthcare provider placed a graft to replace the part of your aorta that was weak. Here's what you need to know following surgery.   Home care  Recommendations for taking care of yourself at home include the following:     Don't do strenuous activity for 4 to 6 weeks after your surgery.    Ask your healthcare provider how long it will be before you can return to work.    Gradually increase your activity. It may take some time for you to return to your normal activity level.    Don t drive for 2 weeks after surgery or while you are taking opioid pain medicine. Ask someone to take you to any appointments.    Check your incision every day for signs of infection. These include swelling, redness, drainage, and warmth.    Keep your incision clean. Wash it gently with soap and water when you shower.    Don t lift anything heavier than 5 pounds for 2 weeks after surgery.    Don't sit or stand for long periods without moving your legs and feet.    Keep your feet up when you sit in a chair.    Take your medicines exactly as directed.  When to call your healthcare provider  Call your healthcare provider right away if you have any of the following:    Redness, pain, swelling, or drainage from your incision    Fever of 100.4 F (38 C) or higher, or as directed by your healthcare provider    Sudden coldness, pain, or paleness in your leg    Loss of feeling in your legs    Severe or sudden pain in your stomach    Fail to pass gas    Bloody bowel movements    Prolonged constipation    Nausea or vomiting    Trouble breathing    Pain or heaviness in your chest or  arms  [employer] last reviewed this educational content on 5/1/2020 2000-2021 [employer]. All rights reserved. This information is not intended as a substitute for professional medical care. Always follow your healthcare professional's instructions.    Patient Education     Pulmonary Function Tests  Pulmonary function tests (also called lung function tests) help measure how well your lungs are working. The tests measure the amount of air you breathe out (exhale) and how long it takes for you to exhale completely. These tests are done to diagnose lung conditions such as asthma and COPD (chronic obstructive pulmonary disease). They may be done before and after you take certain medicines. They may also be used to find out if your shortness of breath gets worse with exercise. Over time, pulmonary function tests can help you and your healthcare providers see how well your treatment is working.      Spirometry measures how much air you can take into your lungs and how fast you can blow the air out.   Your experience  A complete pulmonary function test has 3 parts. You may be given the full test or only certain parts. The full test is painless. It can last 45 to 90 minutes. If you get tired, you can take a break between parts of the test.   Before your test  Follow any instructions you are given to get ready for the test. Otherwise, your test may be canceled.     Stop smoking for at least 1 hour before the test, or as directed.    Don't drink alcohol for at least 4 hours before the test.    Ask your healthcare provider if you should stop taking your breathing medicine 4 to 24 hours before the test.    Don't have caffeine and eat only a light meal. Don't eat a large meal less than 2 hours before the test. Also limit the amount of fluids you drink.    Don't exercise heavily for 30 minutes before the test.    Wear loose clothes that don t restrict your breathing.  Tell the healthcare provider if you have any  of these symptoms during the test:    Sore mouth    Chest, arm, or jaw pain    Tiredness (fatigue) or dizziness    Severe shortness of breath  During your test  The healthcare provider will  you during your test. Depending on how many parts of the test you have, you may sit in a chair and breathe through a mouthpiece. Or you may sit in a clear plastic box that looks like a phone daigle. You will wear nose clips so you only breathe through your mouth. The 3 parts of the test are:     Spirometry. You will hold your breath and blow it out fast. Spirometry is repeated at least 3 times to measure your best effort.    Diffusion. You will hold your breath for 10 seconds. The test measures how well your lungs move air into your blood.    Lung volume. You will breathe in different mixtures of air. How much air you breathe in and out is measured. The amount of air that stays in your lungs is also measured.  After your test  After the test, you can go back to your normal diet, activity, and medicines. If you were asked to skip medicines before the test, ask if you should take them now. Your healthcare provider will talk about the test results with you at your next visit.   Pulmonary function tests measure how much air you can exhale, and how quickly. There are several types of pulmonary function graphs that show data from the tests. Some of the things that tests measure include:     FVC (forced vital capacity). This is the total amount of air you can exhale in 1 long breath.    FEV1 (forced expiratory volume in one second). This is the amount of air you exhale in the first second. FEV1 is often expressed as a percentage of FVC.    FEV1/FVC. This is the amount of air exhaled in the first second, compared with the total amount of air exhaled. It s given as a fraction (ratio) or a percentage. In general, the higher the FEV1/FVC, the better.    PEF (peak expiratory flow). This is a measure of how fast you can exhale. It can be  tested with spirometry or a peak flow meter.  [employer] last reviewed this educational content on 10/1/2019    0721-9853 [employer]. All rights reserved. This information is not intended as a substitute for professional medical care. Always follow your healthcare professional's instructions.

## 2021-10-25 NOTE — PROGRESS NOTES
VASCULAR SURGERY OUTPATIENT CONSULT OR VISIT    VASCULAR SURGEON: Sheryl Fontanez MD    LOCATION:  [facility]    Jose Bailey  Medical Record #: [id number]   YOB: 1955   Age:  66 year old     Date of Service: October 25, 2021     PRIMARY CARE PROVIDER: No Ref-Primary, Physician       Reason for consultation: Evaluation for 7-1/2 cm abdominal aortic aneurysm.    IMPRESSION: Patient with infrarenal abdominal aortic aneurysm of 7-1/2 cm in diameter.  Very tortuous neck.  Right small common iliac artery aneurysm with a diameter of 2.8 mm.  Is a lifelong smoker.  Abdominal wall hernia after diverticulitis surgery in 2008 and recent admission for bowel obstruction that resolved fairly quickly.  Also tells me that he has had Lyme disease for 36 years which was managed on antibiotics for 16 years by an infectious disease specialist.  Has not had that same specialist for 4 years.  Very active person otherwise who takes care of his home and property including a lot of wood cutting.    While the patient's anatomy is amenable to endovascular repair by virtue of being greater than 6-1/2 cm in size and having a tortuous neck he is at significantly increased risk for need for endograft for intervention.  This has been calculated at about 15% over the next 5 years and the most recent study from this month Journal of vascular surgery.  The patient is very much against endovascular pair if he does not need to cure and would much rather undergo open surgery.  That said had a lot of time spent discussing what he can and cannot do both while awaiting aneurysm repair as well as after it is performed with respect to his high intensity activities around woodcutting.    Discussed case with Dr. Ubaldo Alba of general surgery who intends to fix his hernia but was intending on waiting till after his aneurysm repair was performed.    RECOMMENDATION:  Given that the patient has a right iliac artery that  requires reconstruction and that a transperitoneal approach would be superior for this, Dr. Alba is very supportive of us moving forward with a transperitoneal laparotomy takedown adhesions and the hernia sac, and moving forward with aneurysm repair assuming that we did not have peritoneal contents spillage.  At the end he would reconstruct the abdomen with a hernia mesh.  This is a major operation that would require the patient having an epidural and being able to tolerate recovery from a pulmonary standpoint.    We also need to better define his Lyme disease and whether there is a role for additional treatment.  The patient brings up that he thinks he should be on ivermectin.    Patient is also on a naturopathic medication called chaga.  Tells me that it has wound healing and some mild blood thinning properties.    I will send the patient to infectious disease for evaluation of Lyme disease and possible treatment.  I will have him undergo pulmonary function tests and have him seen by pulmonology.  I will schedule him for open aortic aneurysm repair in conjunction with Dr. Alba for hernia reconstruction at the end on December 1.    We will plan on reviewing results from his infectious disease evaluation and pulmonology visit as well as discussion with Dr. Alba in about 2 weeks to 3 weeks time.    Strongly urged him to reduce his smoking as much as possible to optimize recovery from his surgery and wound healing.    The patient does have fairly prominent popliteal pulses and at some point will need a duplex of his popliteal arteries.  No thoracic aortic aneurysm on his CTA of his chest.  No PAD based on palpable dorsalis pedis pulses bilaterally.    ADDENDUM: Patient has not had positive blood test showing Lyme disease.  In this context infectious disease will not see him.  I spoke personally with Dr. Reece of a of infectious disease and he strongly believes that [facility] is that there is no role  for treatment in someone who does not have antibodies to Lyme disease at this point.  He outlines that while there are some food to treat people for chronic Lyme disease without positive blood test results this is really controversial and outside of standard care.    In this context it is unlikely that the patient will find relief for his symptoms of chronic fatigue.  We discussed things further and he is quite concerned about his ability to recover from major open surgery given his strong release that he is dealing with chronic untreated Lyme disease.  We rereviewed the option for endovascular pair and he was much more interested in moving forward with that recognizing a 15% 5-year risk of reintervention but that this would most likely not involve a major surgery.  Plan therefore to cancel open surgery scheduled for December 1 and switch him to an endovascular repair.    Relayed this to Dr. Alba.  Once his aneurysm is repaired he will see him for robotic hernia repair.    In addition the patient's PFT results came back showing fairly reasonable PFTs with about 70% predicted numbers for most parameters.      HPI:  Jose Bailey  is a 66 year old  male who was seen today in evaluation for 7.5 cm AAA recognized on a CT performed when he presented with a bowel obstruction.    The patient had known about the aneurysm for a while and even had a consultation and discussion around open versus endovascular repair when it reached 5.6 cm 3 years ago.  This is with Dr. Hayden.  It appears that the patient was somehow lost to follow-up after that.  He had been struggling with Lyme disease and arthritis around this time and prior.    The patient has a history of sigmoid resection for diverticulitis in 2008.  Never had a bag.  After reconstruction developed an abdominal wall hernia.  This is never been fixed.  After his presentation with small bowel obstruction he had a discussion with  about robotic repair.    The  patient has been dealing with Lyme disease for 36 years according to him.  He tells me that he required visits with 2-second opinions before treatment was initiated.  Was even seen at [facility].  The physician who was involved in recognizing his disease was Dr. Tabor.  Dr. Tabor, according to the patient is not practicing for the last 4 years.  Patient was on medications for the Lyme disease in the past and would like to be put on ivermectin but has not seen a specialist around this.  Symptoms are generalized arthritis, muscle aches, and weakness.  It also has affected his bowel habits to where he has had constipation issues.    The patient also daily takes a natural medicine called Chaga.  He says that this is both healing and blood thinning properties.  He wanted us to be aware of it if we are giving him intraoperative blood thinners.    Overall despite his history of Lyme disease the patient is quite active and does a lot of the care for his property himself.  He spends a lot of time around this time of year cutting wood.  The patient was quite focused on how we would work around his need for woodcutting this fall and we have spent some time discussing the kinds of activities that I think he could not perform.  Specifically before his aneurysm repair did not want him doing any heavy lifting or Valsalva maneuvers or procedures that would increase his blood pressure.  We also discussed that bleeding through the winter was not appropriate given the size of the aneurysm given the known association with seasonal changes and increasing rupture events.  I spent some time also outlining endovascular pair and that it was a 97% cure although it had in his situation probably a 15% need of reintervention over the next 5 years.  That said it would allow him to return to activity very quickly.  Patient absolutely not interested in endovascular repair and he was not when it was discussed with him in 2017 either.    I spent some  time discussing risks of retroperitoneal repair including flank bulge and weakening of his abdominal wall.  Had not discussed transperitoneal repair as had not yet spoken with Dr. Alba.  Patient was quite accepting of additional weakening of his abdominal wall if it meant cure.    We also discussed small but important risk of thromboembolism to the lower extremities.  I quoted this at about a 3% risk.    PHH:  No past medical history on file.      Past Surgical History:   Procedure Laterality Date     COLECTOMY       CYSTOSCOPY          ALLERGIES:   No Known Allergies     MEDS:    Current Outpatient Medications   Medication     acetaminophen (TYLENOL) 325 MG tablet     ascorbic acid, vitamin C, (VITAMIN C) 1000 MG tablet     atenolol (TENORMIN) 100 MG tablet     b complex vitamins (B COMPLEX 1) tablet     Charcoal Activated (ACTIVATED CHARCOAL PO)     cholecalciferol (VITAMIN D3) 125 mcg (5000 units) capsule     diclofenac (VOLTAREN) 1 % topical gel     docusate sodium (STOOL SOFTENER) 100 MG capsule     fish oil-omega-3 fatty acids 1000 MG capsule     hydrochlorothiazide (HYDRODIURIL) 25 MG tablet     Milk Thistle 175 MG CAPS     Misc Natural Products (URINOZINC PROSTATE PO)     saw palmetto fruit 450 mg cap     UNABLE TO FIND     UNABLE TO FIND     Zinc 100 MG TABS     nicotine (NICODERM CQ) 21 MG/24HR 24 hr patch     No current facility-administered medications for this visit.        SOCIAL HABITS:      Tobacco Use      Smoking status: Current Every Day Smoker        Packs/day: 1.50        Years: 53.00        Pack years: 79.5        Types: Cigarettes        Start date: 1970      Smokeless tobacco: Never Used      Tobacco comment: seen by TTS 10/18/2021        Alcohol Use:      Frequency of Alcohol Consumption:      Average Number of Drinks:      Frequency of Binge Drinking:        History   Drug Use No        FAMILY HISTORY:  I have reviewed this patient's family history and updated it with pertinent information  if needed.  Family History   Problem Relation Age of Onset     Cerebrovascular Disease Mother      Aneurysm Mother      No Known Problems Father           REVIEW OF SYSTEMS:    A 12 point ROS was reviewed and except for what is listed in the HPI above, all others are negative    PE:  BP (!) 142/80   Pulse (!) 44   Temp 97.6  F (36.4  C) (Oral)   Resp 24   Wt 237 lb (107.5 kg)   BMI 32.14 kg/m     237 lbs 0 oz   Body mass index is 32.14 kg/m .     EXAM:  GENERAL: This is a well-developed.66 year old male who appears his  stated age  EYES: Grossly normal.  MOUTH: Buccal mucosa normal   CARDIAC:  Not assessed  CHEST/LUNG:  Not Assessed  GASTROINTESINAL (ABDOMEN):Easily palpable midline hernia mass that is nontender.  Aneurysm can be palpated as a pulsatile mass in his mid upper abdomen that is nontender.   MUSCULOSKELETAL: Grossly normal and both lower extremities are intact.  HEME/LYMPH: No lymphedema  NEUROLOGIC: Focally intact, Alert and oriented x 3.   PSYCH: appropriate affect  INTEGUMENT: No open lesions or ulcers  Pulse Exam:   2+ dorsalis pedis pulses bilaterally.  1+ posterior tibial pulses bilaterally.  2+ popliteal pulses bilaterally.          DIAGNOSTIC STUDIES:     Images:  CTA Chest Abdomen Pelvis w Contrast    Result Date: 10/19/2021  EXAM: CTA CHEST ABDOMEN PELVIS W CONTRAST LOCATION: [facility] DATE/TIME: 10/18/2021 12:31 PM INDICATION: Abdominal aortic aneurysm. COMPARISON: CT abdomen 6/9/2017 and 10/16/2021. TECHNIQUE: CT angiogram chest abdomen pelvis during arterial phase of injection of IV contrast. 2D and 3D MIP reconstructions were performed by the CT technologist. Dose reduction techniques were used. CONTRAST: Isovue 370 100 ml. FINDINGS: CT ANGIOGRAM CHEST, ABDOMEN, AND PELVIS: THORACIC AORTA: Moderate degree of calcified and soft atheromatous plaque, particularly in the descending thoracic aorta. Ascending aorta measures up to 4.2 x 4.4 cm. Aortic arch measures  up to 3.5 cm. Descending thoracic aorta measures 3.2 x 3.8 cm. No evidence of dissection or penetrating ulcer. THORACIC GREAT VESSELS: The proximal aspects of great vessels are widely patent. The proximal right subclavian artery is ectatic measuring up to 17 mm. ABDOMINAL AORTA: Infrarenal abdominal aortic aneurysm that measures 6.7 x 7.8 cm. No evidence of significant thrombus within the aneurysm sac. Moderate to severe degree of calcified plaque in the infrarenal aorta. The juxtarenal aorta at the level of the renal artery ostia measures 2.3 x 2.4 cm. The abdominal aortic aneurysm extends to the aortic bifurcation. ILIAC ARTERIES: The right common iliac artery demonstrates fusiform aneurysmal dilatation measures up to 23 mm. The right external and internal iliac arteries are patent and normal in caliber. Mild fusiform aneurysmal dilatation of the distal left common iliac artery which measures up to 19 mm near the bifurcation. The left external and internal iliac arteries are widely patent. The common femoral arteries and imaged portions of the profunda femoral and superficial femoral arteries are normal in caliber. ABDOMINAL AORTIC BRANCHES: Single widely patent renal arteries bilaterally. The celiac and SMA are normal in caliber. CADY is patent. LUNGS AND PLEURA: Calcified granulomas bilaterally. No infiltrates or effusions. MEDIASTINUM/AXILLAE: No mediastinal or hilar adenopathy. No large central pulmonary emboli. No axillary adenopathy. CORONARY ARTERY CALCIFICATION: Mild. HEPATOBILIARY: Cholelithiasis. PANCREAS: Normal. SPLEEN: Splenic atrophy. ADRENAL GLANDS: Nodular hyperplasia of the adrenal glands, unchanged since 2017. KIDNEYS/BLADDER: Subcentimeter hypoattenuating lesions in the kidneys, too small to further characterize. No hydronephrosis. No bladder wall thickening. BOWEL: Persistent distal small bowel obstruction related to an incarcerated loop of small bowel in a ventral hernia (image 148/222).  Of  note, the degree of small bowel dilatation has decreased since the prior study. LYMPH NODES: No adenopathy. No free fluid or fluid collections. No free air. PELVIC ORGANS: Normal. MUSCULOSKELETAL: Fat-containing right paraumbilical hernia, unchanged. Degenerative disc disease thoracolumbar spine.     IMPRESSION: 1.  Infrarenal abdominal aortic aneurysm that measures 6.7 x 7.8 cm, and previously measured 4.3 x 5 cm, in 2016. 2.  Persistent distal small bowel obstruction related to an incarcerated loop of small bowel in a ventral hernia, of note the degree of small bowel dilatation has decreased slightly since the prior study. 3.  Right paramedian ventral hernia containing mesenteric fat, unchanged. 4.  Other incidental findings include nodular adrenal hyperplasia and postop cholecystectomy.     XR Abdomen Port 1 View    Result Date: 10/17/2021  EXAM: XR ABDOMEN PORT 1 VIEWS LOCATION: [facility] DATE/TIME: 10/17/2021 2:24 AM INDICATION: Verify NG tube placement for Gastrografin challenge. COMPARISON: 10/16/2021     IMPRESSION: Enteric tube extending below level of the the EG junction curled in the upper stomach. Large amount of gas throughout the colon.     NM Lexiscan stress test    Result Date: 10/21/2021     The nuclear stress test is negative for inducible myocardial ischemia or infarction.    Left ventricular function is mildly reduced.    The left ventricular ejection fraction at stress is 45%.    The patient is at a low risk of future cardiac ischemic events.    There is no prior study for comparison.     XR External Imaging Abdomen    Result Date: 10/18/2021  Images were obtained from an external facility.  Click PACS Images hyperlink to view images.  Textual results have been scanned into the media tab.    CT External Imaging Abdomen    Result Date: 10/18/2021  Images were obtained from an external facility.  Click PACS Images hyperlink to view images.  Textual results have been  scanned into the media tab.    XR Gastrografin  Challenge    Result Date: 10/17/2021  EXAM: XR GASTROGRAFIN CHALLENGE LOCATION: [facility] DATE/TIME: 10/17/2021 11:10 AM INDICATION: Abdominal pain. Small bowel obstruction. Gastrografin challenge. COMPARISON: KUB earlier this morning, 10/15/2021 and CT 1016 TECHNIQUE: Routine. FINDINGS: FLUOROSCOPIC TIME: 0 minutes. NUMBER OF IMAGES: 2. COLON: NG tube remains in place. There are dilated loops of small bowel containing air as well as contrast measuring up to 6 cm. Contrast does reach the colon.     IMPRESSION: 1.  Findings of a partial small bowel obstruction as noted above. NOTE: ABNORMAL REPORT THE DICTATION ABOVE DESCRIBES AN ABNORMALITY FOR WHICH FOLLOW-UP IS NEEDED.        I personally reviewed the images and my interpretation is that his infrarenal aortic aneurysm has a very tortuous but long neck.  Ectatic iliac arteries bilaterally worse on the right than the left.  Aortic aneurysm is at least 7/2 cm in maximal diameter.  Abdominal wall hernia in the midline with bowel contained..    LABS:      Sodium   Date Value Ref Range Status   10/18/2021 143 136 - 145 mmol/L Final   10/17/2021 141 136 - 145 mmol/L Final   10/16/2021 138 136 - 145 mmol/L Final     Urea Nitrogen   Date Value Ref Range Status   10/18/2021 25 (H) 8 - 22 mg/dL Final   10/17/2021 34 (H) 8 - 22 mg/dL Final   10/16/2021 30 (H) 8 - 22 mg/dL Final     Hemoglobin   Date Value Ref Range Status   10/18/2021 13.2 (L) 13.3 - 17.7 g/dL Final   10/17/2021 12.7 (L) 13.3 - 17.7 g/dL Final     Platelet Count   Date Value Ref Range Status   10/18/2021 270 150 - 450 10e3/uL Final   10/17/2021 265 150 - 450 10e3/uL Final         Sheryl Fontanez MD, MD

## 2021-10-26 ENCOUNTER — TELEPHONE (OUTPATIENT)
Dept: VASCULAR SURGERY | Facility: CLINIC | Age: 66
End: 2021-10-26

## 2021-10-26 ENCOUNTER — TELEPHONE (OUTPATIENT)
Dept: INFECTIOUS DISEASES | Facility: CLINIC | Age: 66
End: 2021-10-26
Payer: COMMERCIAL

## 2021-10-26 NOTE — TELEPHONE ENCOUNTER
Daughter, Clara called back. Patient has records. They have only paper copies of records and will drop off at the [facility] clinic. Please keep an eye out for and review once received.

## 2021-10-26 NOTE — TELEPHONE ENCOUNTER
Writer spoke with pulmonology and they have had an opening tomorrow for PFTs. Pt is scheduled. Dr. Fontanez will review once done and pulmonology consult might not be needed.     Daughter stated that she contacted ID and they won't see patient without at positive lyme disease test which the pt has not had. She stated that they are not concerned about the referral at this time and will contact us if further help is needed.     She was wondering if surgery can be moved up if the PFTs are normal. Informed her that Dr. Fontanez will review on 10/28/21.

## 2021-10-26 NOTE — TELEPHONE ENCOUNTER
Patients leonid is calling stating that the [facility] till mid Dec and [facility] has an opening mid Jan.  Please advise how soon patient should be seen with Pulmonary, is it ok to wait till after surgery with Dr Fontanez

## 2021-10-26 NOTE — TELEPHONE ENCOUNTER
The physician who was involved in recognizing his disease was Dr. Tabor.  Dr. Tabor, according to the patient is not practicing for the last 4 years.  Patient was on medications for the Lyme disease in the past and would like to be put on ivermectin but has not seen a specialist around this.        We need the above records. Please obtain.

## 2021-10-26 NOTE — TELEPHONE ENCOUNTER
ID TEAM    Please review and advise. 1st available new consult is 11/02.    Procedure: Infectious Disease Referral Status: Needs Scheduling   Requested appt date: 10/25/2021 (Approximate) Authorizing: Sheryl Fontanez MD in [facility]   Expires: 10/25/2022 Priority: Routine: Next available opening   Diagnosis: Lyme disease [A69.20]

## 2021-10-26 NOTE — TELEPHONE ENCOUNTER
10/26 - called x 1 - Spoke with daughter mark @ [phone], states that she will check with pt and see if he has any records. States that Dr Tabor had his own practice and stopped altogether, (clinic closed). Not sure where they can get records.     States that pt has not taken any abx for 4 years. Last surgery pt completed, his lymes flared up. Surgeon wants pt to meet with ID before he can do surgery. Wanting something ASAP.

## 2021-10-26 NOTE — TELEPHONE ENCOUNTER
I need a positive Lyme test prior to scheduling. Once the result is received the pt can be scheduled first available.

## 2021-10-27 ENCOUNTER — ALLIED HEALTH/NURSE VISIT (OUTPATIENT)
Dept: PULMONOLOGY | Facility: OTHER | Age: 66
End: 2021-10-27
Attending: SURGERY
Payer: COMMERCIAL

## 2021-10-27 DIAGNOSIS — F17.200 TOBACCO USE DISORDER: ICD-10-CM

## 2021-10-27 DIAGNOSIS — Z11.59 ENCOUNTER FOR SCREENING FOR OTHER VIRAL DISEASES: ICD-10-CM

## 2021-10-27 PROCEDURE — 94729 DIFFUSING CAPACITY: CPT

## 2021-10-27 PROCEDURE — 94060 EVALUATION OF WHEEZING: CPT

## 2021-10-27 PROCEDURE — 94726 PLETHYSMOGRAPHY LUNG VOLUMES: CPT

## 2021-10-27 NOTE — TELEPHONE ENCOUNTER
Patient's daughter Lalita is calling stating her dad would like more information on the different procedure option.  He's leaning more towards the stint and has questions about the difference between the two stint/graft.

## 2021-10-29 DIAGNOSIS — I71.40 ENLARGING ABDOMINAL AORTIC ANEURYSM (AAA) (H): Primary | ICD-10-CM

## 2021-10-29 RX ORDER — CEFAZOLIN SODIUM 2 G/100ML
2 INJECTION, SOLUTION INTRAVENOUS SEE ADMIN INSTRUCTIONS
Status: CANCELLED | OUTPATIENT
Start: 2021-12-01

## 2021-10-29 RX ORDER — CEFAZOLIN SODIUM 2 G/100ML
2 INJECTION, SOLUTION INTRAVENOUS
Status: CANCELLED | OUTPATIENT
Start: 2021-12-01

## 2021-11-01 NOTE — TELEPHONE ENCOUNTER
Dr. Fontanez spoke with pt and surgery was changed to EVAR. All pros and cons discussed with pt.     Surgery changed with [facility] in OR. 730AM start at [facility].   IR scheduled with Elizabeth.

## 2021-11-02 NOTE — TELEPHONE ENCOUNTER
11/02 - called x 2, with daughter mark @ [phone], states too call them back next week if we still dont get it.    Please keep an eye out for.

## 2021-11-04 ENCOUNTER — TELEPHONE (OUTPATIENT)
Dept: VASCULAR SURGERY | Facility: CLINIC | Age: 66
End: 2021-11-04

## 2021-11-04 DIAGNOSIS — I71.40 ENLARGING ABDOMINAL AORTIC ANEURYSM (AAA) (H): Primary | ICD-10-CM

## 2021-11-04 NOTE — TELEPHONE ENCOUNTER
Reason for Call:  Other call back    Detailed comments: Clara the daughter of pt called wanting to reschedule the surgery but was not able to get ahold of anyone. ( I have tried as well and has not gotten anywhere. ) please call daughter and rs the pt's surgery.    Phone Number Patient can be reached at: Other phone number:  [phone]*    Best Time: ANY    Can we leave a detailed message on this number? YES    Call taken on 11/4/2021 at 10:41 AM by Quincy Morelos

## 2021-11-04 NOTE — TELEPHONE ENCOUNTER
No sooner date to schedule. PFTs are normal. They will schedule pre-op and COVID test. Informed they will be contacted the day prior with surgery instructions and arrival time.

## 2021-11-07 LAB
DLCOCOR-%PRED-PRE: 77 %
DLCOCOR-PRE: 21.53 ML/MIN/MMHG
DLCOUNC-%PRED-PRE: 73 %
DLCOUNC-PRE: 20.63 ML/MIN/MMHG
DLCOUNC-PRED: 27.9 ML/MIN/MMHG
ERV-%PRED-PRE: 51 %
ERV-PRE: 0.51 L
ERV-PRED: 0.98 L
EXPTIME-PRE: 8.89 SEC
FEF2575-%PRED-POST: 118 %
FEF2575-%PRED-PRE: 93 %
FEF2575-POST: 3.16 L/SEC
FEF2575-PRE: 2.51 L/SEC
FEF2575-PRED: 2.68 L/SEC
FEFMAX-%PRED-PRE: 71 %
FEFMAX-PRE: 6.42 L/SEC
FEFMAX-PRED: 9.01 L/SEC
FEV1-%PRED-PRE: 81 %
FEV1-PRE: 2.86 L
FEV1FEV6-PRE: 78 %
FEV1FEV6-PRED: 78 %
FEV1FVC-PRE: 77 %
FEV1FVC-PRED: 76 %
FEV1SVC-PRE: 71 %
FEV1SVC-PRED: 68 %
FIFMAX-PRE: 4.52 L/SEC
FRCPLETH-%PRED-PRE: 108 %
FRCPLETH-PRE: 4.13 L
FRCPLETH-PRED: 3.8 L
FVC-%PRED-PRE: 79 %
FVC-PRE: 3.69 L
FVC-PRED: 4.63 L
IC-%PRED-PRE: 78 %
IC-PRE: 3.29 L
IC-PRED: 4.18 L
RVPLETH-%PRED-PRE: 126 %
RVPLETH-PRE: 3.37 L
RVPLETH-PRED: 2.66 L
TLCPLETH-%PRED-PRE: 98 %
TLCPLETH-PRE: 7.43 L
TLCPLETH-PRED: 7.53 L
VA-%PRED-PRE: 106 %
VA-PRE: 7.37 L
VC-%PRED-PRE: 78 %
VC-PRE: 4.05 L
VC-PRED: 5.16 L

## 2021-11-16 ENCOUNTER — OFFICE VISIT (OUTPATIENT)
Dept: FAMILY MEDICINE | Facility: CLINIC | Age: 66
End: 2021-11-16
Payer: COMMERCIAL

## 2021-11-16 VITALS
TEMPERATURE: 97.5 F | HEART RATE: 53 BPM | SYSTOLIC BLOOD PRESSURE: 134 MMHG | BODY MASS INDEX: 33.05 KG/M2 | DIASTOLIC BLOOD PRESSURE: 72 MMHG | OXYGEN SATURATION: 97 % | HEIGHT: 72 IN | WEIGHT: 244 LBS | RESPIRATION RATE: 24 BRPM

## 2021-11-16 DIAGNOSIS — I10 ESSENTIAL HYPERTENSION: ICD-10-CM

## 2021-11-16 DIAGNOSIS — N13.8 BENIGN PROSTATIC HYPERPLASIA WITH URINARY OBSTRUCTION: ICD-10-CM

## 2021-11-16 DIAGNOSIS — F17.200 TOBACCO USE DISORDER: ICD-10-CM

## 2021-11-16 DIAGNOSIS — N18.31 STAGE 3A CHRONIC KIDNEY DISEASE (H): ICD-10-CM

## 2021-11-16 DIAGNOSIS — Z01.818 PREOP GENERAL PHYSICAL EXAM: Primary | ICD-10-CM

## 2021-11-16 DIAGNOSIS — I71.40 ABDOMINAL AORTIC ANEURYSM (AAA) WITHOUT RUPTURE (H): ICD-10-CM

## 2021-11-16 DIAGNOSIS — N40.1 BENIGN PROSTATIC HYPERPLASIA WITH URINARY OBSTRUCTION: ICD-10-CM

## 2021-11-16 DIAGNOSIS — G47.33 OBSTRUCTIVE SLEEP APNEA SYNDROME: ICD-10-CM

## 2021-11-16 PROCEDURE — 99204 OFFICE O/P NEW MOD 45 MIN: CPT | Performed by: FAMILY MEDICINE

## 2021-11-16 RX ORDER — HYDROCHLOROTHIAZIDE 25 MG/1
25 TABLET ORAL DAILY
Qty: 90 TABLET | Refills: 1 | Status: ON HOLD | OUTPATIENT
Start: 2021-11-16 | End: 2021-12-02

## 2021-11-16 ASSESSMENT — PAIN SCALES - GENERAL: PAINLEVEL: MILD PAIN (2)

## 2021-11-16 ASSESSMENT — MIFFLIN-ST. JEOR: SCORE: 1914.91

## 2021-11-16 NOTE — H&P (VIEW-ONLY)
[facility]  [address] [place] [address]  Phone: [phone]  Fax: [phone]  Primary Provider: No Ref-Primary, Physician        PREOPERATIVE EVALUATION:  Today's date: 11/16/2021    Jose Bailey is a 66 year old male who presents for a preoperative evaluation.    Surgical Information:  Surgery/Procedure: Endovascular repair of abdominal aortic aneurysm with endograft using 2 docking limbs.  Large-bore access closure x2.  Selective renal artery catheterization  Surgery Location:[facility]  Surgeon: Sheryl Fontanez MD  Surgery Date: 12/01/21  Time of Surgery:   Where patient plans to recover: At home with family  Fax number for surgical facility: Note does not need to be faxed, will be available electronically in Epic.    Type of Anesthesia Anticipated: Choice    Assessment & Plan     The proposed surgical procedure is considered HIGH risk.      ICD-10-CM    1. Preop general physical exam  Z01.818    2. Abdominal aortic aneurysm (AAA) without rupture (H)  I71.4    3. Essential hypertension  I10 hydrochlorothiazide (HYDRODIURIL) 25 MG tablet   4. Stage 3a chronic kidney disease (H)  N18.31    5. Tobacco use disorder  F17.200    6. Obstructive sleep apnea syndrome  G47.33    7. Benign prostatic hyperplasia with urinary obstruction  N40.1     N13.8        Risks and Recommendations:  The patient has the following additional risks and recommendations for perioperative complications:   - No identified additional risk factors other than previously addressed    - Smoking cessation counseling provided, associated health hazards explained in detail, not ready to quit currently    Medication Instructions:  Patient is to take all scheduled medications on the day of surgery       RECOMMENDATION:  APPROVAL GIVEN to proceed with proposed procedure, without further diagnostic evaluation.      Subjective   HPI related to upcoming procedure:   66-year-old male presents for a preop physical  exam.  Patient is scheduled to have abdominal aortic aneurysm repair on December 1, 2021. He requires evaluation and anesthesia risk assessment prior to undergoing surgery/procedure.  Patient denies any fever, chills, sore throat, cough, shortness of breath, chest pain, palpitation, diarrhea, constipation, headache, abdominal systemic symptoms.    Preop Questions 11/16/2021   1. Have you ever had a heart attack or stroke? No   2. Have you ever had surgery on your heart or blood vessels, such as a stent placement, a coronary artery bypass, or surgery on an artery in your head, neck, heart, or legs? No   3. Do you have chest pain with activity? No   4. Do you have a history of  heart failure? No   5. Do you currently have a cold, bronchitis or symptoms of other infection? No   6. Do you have a cough, shortness of breath, or wheezing? No   7. Do you or anyone in your family have previous history of blood clots? No   8. Do you or does anyone in your family have a serious bleeding problem such as prolonged bleeding following surgeries or cuts? No   9. Have you ever had problems with anemia or been told to take iron pills? No   10. Have you had any abnormal blood loss such as black, tarry or bloody stools? No   11. Have you ever had a blood transfusion? UNKNOWN    12. Are you willing to have a blood transfusion if it is medically needed before, during, or after your surgery? Yes   13. Have you or any of your relatives ever had problems with anesthesia? No   14. Do you have sleep apnea, excessive snoring or daytime drowsiness? No   15. Do you have any artifical heart valves or other implanted medical devices like a pacemaker, defibrillator, or continuous glucose monitor? No   16. Do you have artificial joints? No   17. Are you allergic to latex? No       Health Care Directive:  Patient does not have a Health Care Directive or Living Will: Discussed advance care planning with patient; information given to patient to  review.    Preoperative Review of :   reviewed - no record of controlled substances prescribed.      Review of Systems  Constitutional, neuro, ENT, endocrine, pulmonary, cardiac, gastrointestinal, genitourinary, musculoskeletal, integument and psychiatric systems are negative, except as otherwise noted.    Patient Active Problem List    Diagnosis Date Noted     THOMPSON (acute kidney injury) (H) 10/17/2021     Priority: Medium     CKD (chronic kidney disease) stage 3, GFR 30-59 ml/min (H) 10/17/2021     Priority: Medium     Tobacco use disorder 10/17/2021     Priority: Medium     Enlarging abdominal aortic aneurysm (AAA) (H) 10/16/2021     Priority: Medium     SBO (small bowel obstruction) (H) 10/16/2021     Priority: Medium     Obstructive sleep apnea syndrome 08/28/2008     Priority: Medium     Irritable bowel syndrome 07/17/2008     Priority: Medium     Benign prostatic hyperplasia with urinary obstruction 06/10/2008     Priority: Medium     Essential hypertension 05/19/2008     Priority: Medium      No past medical history on file.  Past Surgical History:   Procedure Laterality Date     COLECTOMY       CYSTOSCOPY       Current Outpatient Medications   Medication Sig Dispense Refill     ascorbic acid, vitamin C, (VITAMIN C) 1000 MG tablet Take 2,000 mg by mouth daily        atenolol (TENORMIN) 100 MG tablet Take 100 mg by mouth daily        b complex vitamins (B COMPLEX 1) tablet Take 1 tablet by mouth daily        Charcoal Activated (ACTIVATED CHARCOAL PO) Take 2 capsules by mouth daily       cholecalciferol (VITAMIN D3) 125 mcg (5000 units) capsule Take 125 mcg by mouth daily       docusate sodium (STOOL SOFTENER) 100 MG capsule Take 100 mg by mouth 2 times daily        fish oil-omega-3 fatty acids 1000 MG capsule Take 4 g by mouth 2 times daily       hydrochlorothiazide (HYDRODIURIL) 25 MG tablet Take 1 tablet (25 mg) by mouth daily 90 tablet 1     Milk Thistle 175 MG CAPS Take 2 capsules by mouth daily        "Misc Natural Products (URINOZINC PROSTATE PO) Take 1 Tablespoonful by mouth 2 times daily       saw palmetto fruit 450 mg cap Take 900 mg by mouth 2 times daily        UNABLE TO FIND Chaga       UNABLE TO FIND Vitamin: Special 2       Zinc 100 MG TABS Take 100 mg by mouth daily         No Known Allergies     Social History     Tobacco Use     Smoking status: Current Every Day Smoker     Packs/day: 1.50     Years: 53.00     Pack years: 79.50     Types: Cigarettes     Start date: 1968     Smokeless tobacco: Never Used     Tobacco comment: seen by TTS 10/18/2021   Substance Use Topics     Alcohol use: Yes     Family History   Problem Relation Age of Onset     Cerebrovascular Disease Mother      Aneurysm Mother      No Known Problems Father      History   Drug Use No         Objective     /72 (BP Location: Right arm, Patient Position: Sitting, Cuff Size: Adult Large)   Pulse 53   Temp 97.5  F (36.4  C) (Tympanic)   Resp 24   Ht 1.829 m (6' 0.01\")   Wt 110.7 kg (244 lb)   SpO2 97%   BMI 33.08 kg/m      Physical Exam    GENERAL APPEARANCE: alert, active and no distress     EYES: EOMI,  PERRL     HENT: ear canals and TM's normal and nose and mouth without ulcers or lesions     NECK: no adenopathy, no asymmetry, masses, or scars and thyroid normal to palpation     RESP: lungs clear to auscultation - no rales, rhonchi or wheezes     CV: regular rates and rhythm, normal S1 S2, no S3 or S4 and no murmur, click or rub     ABDOMEN:  soft, nontender, no HSM or masses and bowel sounds normal     MS: extremities normal- no gross deformities noted     SKIN: no suspicious lesions or rashes     NEURO: Normal strength and tone, sensory exam grossly normal, mentation intact and speech normal     PSYCH: mentation appears normal. and affect normal/bright     LYMPHATICS: No cervical adenopathy    Recent Labs   Lab Test 10/18/21  0805 10/17/21  0722   HGB 13.2* 12.7*    265    141   POTASSIUM 4.1 4.3   CR 1.09 " 1.52*        Diagnostics:  Last 30 days reviewed.  Lexiscan: 10/20/2021     The nuclear stress test is negative for inducible myocardial ischemia or infarction.     Left ventricular function is mildly reduced.     The left ventricular ejection fraction at stress is 45%.     The patient is at a low risk of future cardiac ischemic events.     There is no prior study for comparison.    Revised Cardiac Risk Index (RCRI):  The patient has the following serious cardiovascular risks for perioperative complications:   - High risk surgery (>5% cardiac complication risk) = 1 point   - Congestive Heart Failure (pulmonary edema, PND, s3 royal, CXR with pulmonary congestion, basilar rales) = 1 point     RCRI Interpretation: 2 points: Class III (moderate risk - 6.6% complication rate)            Signed Electronically by: Dakota Villanueva MD  Copy of this evaluation report is provided to requesting physician.

## 2021-11-16 NOTE — PROGRESS NOTES
[facility]  [address] [place] [address]  Phone: [phone]  Fax: [phone]  Primary Provider: No Ref-Primary, Physician        PREOPERATIVE EVALUATION:  Today's date: 11/16/2021    Jose Bailey is a 66 year old male who presents for a preoperative evaluation.    Surgical Information:  Surgery/Procedure: Endovascular repair of abdominal aortic aneurysm with endograft using 2 docking limbs.  Large-bore access closure x2.  Selective renal artery catheterization  Surgery Location:[facility]  Surgeon: Sheryl Fontanez MD  Surgery Date: 12/01/21  Time of Surgery:   Where patient plans to recover: At home with family  Fax number for surgical facility: Note does not need to be faxed, will be available electronically in Epic.    Type of Anesthesia Anticipated: Choice    Assessment & Plan     The proposed surgical procedure is considered HIGH risk.      ICD-10-CM    1. Preop general physical exam  Z01.818    2. Abdominal aortic aneurysm (AAA) without rupture (H)  I71.4    3. Essential hypertension  I10 hydrochlorothiazide (HYDRODIURIL) 25 MG tablet   4. Stage 3a chronic kidney disease (H)  N18.31    5. Tobacco use disorder  F17.200    6. Obstructive sleep apnea syndrome  G47.33    7. Benign prostatic hyperplasia with urinary obstruction  N40.1     N13.8        Risks and Recommendations:  The patient has the following additional risks and recommendations for perioperative complications:   - No identified additional risk factors other than previously addressed    - Smoking cessation counseling provided, associated health hazards explained in detail, not ready to quit currently    Medication Instructions:  Patient is to take all scheduled medications on the day of surgery       RECOMMENDATION:  APPROVAL GIVEN to proceed with proposed procedure, without further diagnostic evaluation.      Subjective   HPI related to upcoming procedure:   66-year-old male presents for a preop physical  exam.  Patient is scheduled to have abdominal aortic aneurysm repair on December 1, 2021. He requires evaluation and anesthesia risk assessment prior to undergoing surgery/procedure.  Patient denies any fever, chills, sore throat, cough, shortness of breath, chest pain, palpitation, diarrhea, constipation, headache, abdominal systemic symptoms.    Preop Questions 11/16/2021   1. Have you ever had a heart attack or stroke? No   2. Have you ever had surgery on your heart or blood vessels, such as a stent placement, a coronary artery bypass, or surgery on an artery in your head, neck, heart, or legs? No   3. Do you have chest pain with activity? No   4. Do you have a history of  heart failure? No   5. Do you currently have a cold, bronchitis or symptoms of other infection? No   6. Do you have a cough, shortness of breath, or wheezing? No   7. Do you or anyone in your family have previous history of blood clots? No   8. Do you or does anyone in your family have a serious bleeding problem such as prolonged bleeding following surgeries or cuts? No   9. Have you ever had problems with anemia or been told to take iron pills? No   10. Have you had any abnormal blood loss such as black, tarry or bloody stools? No   11. Have you ever had a blood transfusion? UNKNOWN    12. Are you willing to have a blood transfusion if it is medically needed before, during, or after your surgery? Yes   13. Have you or any of your relatives ever had problems with anesthesia? No   14. Do you have sleep apnea, excessive snoring or daytime drowsiness? No   15. Do you have any artifical heart valves or other implanted medical devices like a pacemaker, defibrillator, or continuous glucose monitor? No   16. Do you have artificial joints? No   17. Are you allergic to latex? No       Health Care Directive:  Patient does not have a Health Care Directive or Living Will: Discussed advance care planning with patient; information given to patient to  review.    Preoperative Review of :   reviewed - no record of controlled substances prescribed.      Review of Systems  Constitutional, neuro, ENT, endocrine, pulmonary, cardiac, gastrointestinal, genitourinary, musculoskeletal, integument and psychiatric systems are negative, except as otherwise noted.    Patient Active Problem List    Diagnosis Date Noted     THOMPSON (acute kidney injury) (H) 10/17/2021     Priority: Medium     CKD (chronic kidney disease) stage 3, GFR 30-59 ml/min (H) 10/17/2021     Priority: Medium     Tobacco use disorder 10/17/2021     Priority: Medium     Enlarging abdominal aortic aneurysm (AAA) (H) 10/16/2021     Priority: Medium     SBO (small bowel obstruction) (H) 10/16/2021     Priority: Medium     Obstructive sleep apnea syndrome 08/28/2008     Priority: Medium     Irritable bowel syndrome 07/17/2008     Priority: Medium     Benign prostatic hyperplasia with urinary obstruction 06/10/2008     Priority: Medium     Essential hypertension 05/19/2008     Priority: Medium      No past medical history on file.  Past Surgical History:   Procedure Laterality Date     COLECTOMY       CYSTOSCOPY       Current Outpatient Medications   Medication Sig Dispense Refill     ascorbic acid, vitamin C, (VITAMIN C) 1000 MG tablet Take 2,000 mg by mouth daily        atenolol (TENORMIN) 100 MG tablet Take 100 mg by mouth daily        b complex vitamins (B COMPLEX 1) tablet Take 1 tablet by mouth daily        Charcoal Activated (ACTIVATED CHARCOAL PO) Take 2 capsules by mouth daily       cholecalciferol (VITAMIN D3) 125 mcg (5000 units) capsule Take 125 mcg by mouth daily       docusate sodium (STOOL SOFTENER) 100 MG capsule Take 100 mg by mouth 2 times daily        fish oil-omega-3 fatty acids 1000 MG capsule Take 4 g by mouth 2 times daily       hydrochlorothiazide (HYDRODIURIL) 25 MG tablet Take 1 tablet (25 mg) by mouth daily 90 tablet 1     Milk Thistle 175 MG CAPS Take 2 capsules by mouth daily        "Misc Natural Products (URINOZINC PROSTATE PO) Take 1 Tablespoonful by mouth 2 times daily       saw palmetto fruit 450 mg cap Take 900 mg by mouth 2 times daily        UNABLE TO FIND Chaga       UNABLE TO FIND Vitamin: Special 2       Zinc 100 MG TABS Take 100 mg by mouth daily         No Known Allergies     Social History     Tobacco Use     Smoking status: Current Every Day Smoker     Packs/day: 1.50     Years: 53.00     Pack years: 79.50     Types: Cigarettes     Start date: 1968     Smokeless tobacco: Never Used     Tobacco comment: seen by TTS 10/18/2021   Substance Use Topics     Alcohol use: Yes     Family History   Problem Relation Age of Onset     Cerebrovascular Disease Mother      Aneurysm Mother      No Known Problems Father      History   Drug Use No         Objective     /72 (BP Location: Right arm, Patient Position: Sitting, Cuff Size: Adult Large)   Pulse 53   Temp 97.5  F (36.4  C) (Tympanic)   Resp 24   Ht 1.829 m (6' 0.01\")   Wt 110.7 kg (244 lb)   SpO2 97%   BMI 33.08 kg/m      Physical Exam    GENERAL APPEARANCE: alert, active and no distress     EYES: EOMI,  PERRL     HENT: ear canals and TM's normal and nose and mouth without ulcers or lesions     NECK: no adenopathy, no asymmetry, masses, or scars and thyroid normal to palpation     RESP: lungs clear to auscultation - no rales, rhonchi or wheezes     CV: regular rates and rhythm, normal S1 S2, no S3 or S4 and no murmur, click or rub     ABDOMEN:  soft, nontender, no HSM or masses and bowel sounds normal     MS: extremities normal- no gross deformities noted     SKIN: no suspicious lesions or rashes     NEURO: Normal strength and tone, sensory exam grossly normal, mentation intact and speech normal     PSYCH: mentation appears normal. and affect normal/bright     LYMPHATICS: No cervical adenopathy    Recent Labs   Lab Test 10/18/21  0805 10/17/21  0722   HGB 13.2* 12.7*    265    141   POTASSIUM 4.1 4.3   CR 1.09 " 1.52*        Diagnostics:  Last 30 days reviewed.  Lexiscan: 10/20/2021     The nuclear stress test is negative for inducible myocardial ischemia or infarction.     Left ventricular function is mildly reduced.     The left ventricular ejection fraction at stress is 45%.     The patient is at a low risk of future cardiac ischemic events.     There is no prior study for comparison.    Revised Cardiac Risk Index (RCRI):  The patient has the following serious cardiovascular risks for perioperative complications:   - High risk surgery (>5% cardiac complication risk) = 1 point   - Congestive Heart Failure (pulmonary edema, PND, s3 royal, CXR with pulmonary congestion, basilar rales) = 1 point     RCRI Interpretation: 2 points: Class III (moderate risk - 6.6% complication rate)            Signed Electronically by: Dakota Villanueva MD  Copy of this evaluation report is provided to requesting physician.

## 2021-11-16 NOTE — NURSING NOTE
"Chief Complaint   Patient presents with     Pre-Op Exam       Initial /72 (BP Location: Right arm, Patient Position: Sitting, Cuff Size: Adult Large)   Pulse 53   Temp 97.5  F (36.4  C) (Tympanic)   Resp 24   Ht 1.829 m (6' 0.01\")   Wt 110.7 kg (244 lb)   SpO2 97%   BMI 33.08 kg/m   Estimated body mass index is 33.08 kg/m  as calculated from the following:    Height as of this encounter: 1.829 m (6' 0.01\").    Weight as of this encounter: 110.7 kg (244 lb).    Patient presents to the clinic using No DME    Health Maintenance that is potentially due pending provider review:  NONE    n/a    Is there anyone who you would like to be able to receive your results? No  If yes have patient fill out TOM    "

## 2021-11-24 ENCOUNTER — TELEPHONE (OUTPATIENT)
Dept: VASCULAR SURGERY | Facility: CLINIC | Age: 66
End: 2021-11-24
Payer: COMMERCIAL

## 2021-11-24 ENCOUNTER — TELEPHONE (OUTPATIENT)
Dept: FAMILY MEDICINE | Facility: CLINIC | Age: 66
End: 2021-11-24
Payer: COMMERCIAL

## 2021-11-24 NOTE — TELEPHONE ENCOUNTER
Patient started a new blood pressure medication while in the hospital in Oct. Since starting the medication, patient has been feeling lightheaded, weak, and tired. Patient would like a call back to discuss his symptoms as he is concerned and is scheduled for surgery next week.

## 2021-11-24 NOTE — TELEPHONE ENCOUNTER
Reason for call:  Patient reporting a symptom    Symptom or request: Pt's daughter Lalita says she would like someone to call to talk to her dad. He was put on hydrochlorothiazide by Dr Villanueva. He is feeling lightheaded, vertigo when he bends over to the point he feels like he will pass out, sick to his stomach, tired and weak    Phone Number patient can be reached at:  Home number on file [phone] (home)    Best Time:  anytime    Can we leave a detailed message on this number:  YES    Call taken on 11/24/2021 at 11:31 AM by Carmen Burns

## 2021-11-24 NOTE — TELEPHONE ENCOUNTER
"S-(situation): Call placed to patient. He has many concerns    B-(background): Surgery scheduled for 12/01/21 to repair AAA. Chronic Lymes. History diverticulitis. Chronic abdominal pain. Recently started on hydrochlorothiazide, having dizziness    A-(assessment): Patient reporting waking during the night to urinate and feeling very dizzy stating his equilibrium was was out of whack on 11/22.  He had to hold on the the walls and doorway to prevent falling. It was the same when he got up for the day. Symptoms improved enough to go to the chiropractor. He reports symptoms have improved enough for him to drive and do yard work. He is also reporting chronic bowel problems with \"gas build up\" and stating his rectal muscles are tight, like a restriction. And has a tender spot on his left testicle.     R-(recommendations): Patient advised to schedule a clinic appt to discuss and establish primary care. He would like to see someone before his surgery. He states he might check with [facility] to see if he can get in there as well. There are only same day appts available before surgery. Told patient he could call the clinic at 7 AM Friday to try and schedule. Offered to schedule an appt for after surgery to discuss his issues. He stated, \"we'll see\".   Advised to go to Er if dizziness and equilibrium off again or chest pain, numbness/tingling on one side of the body, difficulty with speech. Urgent care hours given as well. Serene MONSIVAIS RN      "

## 2021-11-24 NOTE — TELEPHONE ENCOUNTER
Pt informed to contact Dr. Villanueva office to discuss symptoms. Pt didn't say anything about his Lyme disease when he was contacted, daughter called a couple of weeks ago and they are going to hold off on any treatment for the lyme disease at this time.

## 2021-11-24 NOTE — TELEPHONE ENCOUNTER
Spoke to patient. Asked him if he spoke to his PCP regarding this and he states he does not have a PCP. He did have a pre op physical last week but did not mention the issue with the hydrochlorothiaze during the appointment. It is worse in the last 3-4 days.    Told him he should call back to the [facility] and discuss the issue with them as he needs to establish care with a PCP and they have seen him recently. Dr. Fontanez did not prescribe the medication.    Patient asking to speak with Dr. Fontanez's nurse Selene. He states he has Lyme disease.

## 2021-11-28 ENCOUNTER — LAB (OUTPATIENT)
Dept: LAB | Facility: CLINIC | Age: 66
End: 2021-11-28
Attending: SURGERY
Payer: COMMERCIAL

## 2021-11-28 DIAGNOSIS — Z11.59 ENCOUNTER FOR SCREENING FOR OTHER VIRAL DISEASES: ICD-10-CM

## 2021-11-28 PROCEDURE — U0003 INFECTIOUS AGENT DETECTION BY NUCLEIC ACID (DNA OR RNA); SEVERE ACUTE RESPIRATORY SYNDROME CORONAVIRUS 2 (SARS-COV-2) (CORONAVIRUS DISEASE [COVID-19]), AMPLIFIED PROBE TECHNIQUE, MAKING USE OF HIGH THROUGHPUT TECHNOLOGIES AS DESCRIBED BY CMS-2020-01-R: HCPCS

## 2021-11-28 PROCEDURE — U0005 INFEC AGEN DETEC AMPLI PROBE: HCPCS

## 2021-11-29 LAB — SARS-COV-2 RNA RESP QL NAA+PROBE: NEGATIVE

## 2021-11-30 ENCOUNTER — ANESTHESIA EVENT (OUTPATIENT)
Dept: SURGERY | Facility: HOSPITAL | Age: 66
DRG: 269 | End: 2021-11-30
Payer: COMMERCIAL

## 2021-12-01 ENCOUNTER — ANESTHESIA (OUTPATIENT)
Dept: SURGERY | Facility: HOSPITAL | Age: 66
DRG: 269 | End: 2021-12-01
Payer: COMMERCIAL

## 2021-12-01 ENCOUNTER — HOSPITAL ENCOUNTER (INPATIENT)
Dept: INTERVENTIONAL RADIOLOGY/VASCULAR | Facility: HOSPITAL | Age: 66
Setting detail: SURGERY ADMIT
DRG: 269 | End: 2021-12-01
Attending: SURGERY | Admitting: SURGERY
Payer: COMMERCIAL

## 2021-12-01 ENCOUNTER — HOSPITAL ENCOUNTER (INPATIENT)
Facility: HOSPITAL | Age: 66
LOS: 1 days | Discharge: HOME | DRG: 269 | End: 2021-12-02
Attending: SURGERY | Admitting: SURGERY
Payer: COMMERCIAL

## 2021-12-01 ENCOUNTER — APPOINTMENT (OUTPATIENT)
Dept: RADIOLOGY | Facility: HOSPITAL | Age: 66
DRG: 269 | End: 2021-12-01
Attending: SURGERY
Payer: COMMERCIAL

## 2021-12-01 DIAGNOSIS — G89.18 ACUTE POST-OPERATIVE PAIN: Primary | ICD-10-CM

## 2021-12-01 DIAGNOSIS — I71.40 ENLARGING ABDOMINAL AORTIC ANEURYSM (AAA) (H): ICD-10-CM

## 2021-12-01 DIAGNOSIS — I71.40 ABDOMINAL AORTIC ANEURYSM (AAA) WITHOUT RUPTURE (H): ICD-10-CM

## 2021-12-01 DIAGNOSIS — M75.20 BICEPS TENDONITIS, UNSPECIFIED LATERALITY: ICD-10-CM

## 2021-12-01 DIAGNOSIS — N13.8 BENIGN PROSTATIC HYPERPLASIA WITH URINARY OBSTRUCTION: ICD-10-CM

## 2021-12-01 DIAGNOSIS — N40.1 BENIGN PROSTATIC HYPERPLASIA WITH URINARY OBSTRUCTION: ICD-10-CM

## 2021-12-01 DIAGNOSIS — I10 ESSENTIAL HYPERTENSION: ICD-10-CM

## 2021-12-01 PROBLEM — M79.621 PAIN OF RIGHT UPPER ARM: Status: ACTIVE | Noted: 2021-12-01

## 2021-12-01 LAB
ABO/RH(D): NORMAL
ANTIBODY SCREEN: NEGATIVE
APTT PPP: 40 SECONDS (ref 22–38)
BASOPHILS # BLD AUTO: 0 10E3/UL (ref 0–0.2)
BASOPHILS NFR BLD AUTO: 1 %
CREAT SERPL-MCNC: 1.63 MG/DL (ref 0.7–1.3)
EOSINOPHIL # BLD AUTO: 0.4 10E3/UL (ref 0–0.7)
EOSINOPHIL NFR BLD AUTO: 6 %
ERYTHROCYTE [DISTWIDTH] IN BLOOD BY AUTOMATED COUNT: 13.8 % (ref 10–15)
GFR SERPL CREATININE-BSD FRML MDRD: 43 ML/MIN/1.73M2
HCT VFR BLD AUTO: 42.6 % (ref 40–53)
HGB BLD-MCNC: 14.1 G/DL (ref 13.3–17.7)
IMM GRANULOCYTES # BLD: 0 10E3/UL
IMM GRANULOCYTES NFR BLD: 1 %
INR PPP: 0.97 (ref 0.9–1.15)
LYMPHOCYTES # BLD AUTO: 1.9 10E3/UL (ref 0.8–5.3)
LYMPHOCYTES NFR BLD AUTO: 29 %
MCH RBC QN AUTO: 31.9 PG (ref 26.5–33)
MCHC RBC AUTO-ENTMCNC: 33.1 G/DL (ref 31.5–36.5)
MCV RBC AUTO: 96 FL (ref 78–100)
MONOCYTES # BLD AUTO: 0.8 10E3/UL (ref 0–1.3)
MONOCYTES NFR BLD AUTO: 13 %
NEUTROPHILS # BLD AUTO: 3.4 10E3/UL (ref 1.6–8.3)
NEUTROPHILS NFR BLD AUTO: 50 %
NRBC # BLD AUTO: 0 10E3/UL
NRBC BLD AUTO-RTO: 0 /100
PLATELET # BLD AUTO: 280 10E3/UL (ref 150–450)
PLATELET # BLD AUTO: 317 10E3/UL (ref 150–450)
POTASSIUM BLD-SCNC: 4.5 MMOL/L (ref 3.5–5)
RBC # BLD AUTO: 4.42 10E6/UL (ref 4.4–5.9)
SPECIMEN EXPIRATION DATE: NORMAL
WBC # BLD AUTO: 6.5 10E3/UL (ref 4–11)

## 2021-12-01 PROCEDURE — 36415 COLL VENOUS BLD VENIPUNCTURE: CPT | Performed by: SURGERY

## 2021-12-01 PROCEDURE — 370N000017 HC ANESTHESIA TECHNICAL FEE, PER MIN: Performed by: SURGERY

## 2021-12-01 PROCEDURE — 250N000011 HC RX IP 250 OP 636: Performed by: NURSE ANESTHETIST, CERTIFIED REGISTERED

## 2021-12-01 PROCEDURE — 250N000011 HC RX IP 250 OP 636: Performed by: SURGERY

## 2021-12-01 PROCEDURE — 250N000025 HC SEVOFLURANE, PER MIN: Performed by: SURGERY

## 2021-12-01 PROCEDURE — 75625 CONTRAST EXAM ABDOMINL AORTA: CPT | Mod: TC

## 2021-12-01 PROCEDURE — 278N000051 HC OR IMPLANT GENERAL: Performed by: SURGERY

## 2021-12-01 PROCEDURE — C1760 CLOSURE DEV, VASC: HCPCS | Performed by: SURGERY

## 2021-12-01 PROCEDURE — 999N000141 HC STATISTIC PRE-PROCEDURE NURSING ASSESSMENT: Performed by: SURGERY

## 2021-12-01 PROCEDURE — 34713 PERQ ACCESS & CLSR FEM ART: CPT | Mod: 50 | Performed by: SURGERY

## 2021-12-01 PROCEDURE — 99222 1ST HOSP IP/OBS MODERATE 55: CPT | Performed by: FAMILY MEDICINE

## 2021-12-01 PROCEDURE — 272N000001 HC OR GENERAL SUPPLY STERILE: Performed by: SURGERY

## 2021-12-01 PROCEDURE — C1769 GUIDE WIRE: HCPCS | Performed by: SURGERY

## 2021-12-01 PROCEDURE — 82565 ASSAY OF CREATININE: CPT | Performed by: SURGERY

## 2021-12-01 PROCEDURE — 120N000001 HC R&B MED SURG/OB

## 2021-12-01 PROCEDURE — 250N000013 HC RX MED GY IP 250 OP 250 PS 637: Performed by: FAMILY MEDICINE

## 2021-12-01 PROCEDURE — 250N000009 HC RX 250: Performed by: FAMILY MEDICINE

## 2021-12-01 PROCEDURE — P9041 ALBUMIN (HUMAN),5%, 50ML: HCPCS | Performed by: NURSE ANESTHETIST, CERTIFIED REGISTERED

## 2021-12-01 PROCEDURE — 85025 COMPLETE CBC W/AUTO DIFF WBC: CPT | Performed by: SURGERY

## 2021-12-01 PROCEDURE — C1725 CATH, TRANSLUMIN NON-LASER: HCPCS | Performed by: SURGERY

## 2021-12-01 PROCEDURE — 999N000083 IR ABDOMINAL AORTOGRAM

## 2021-12-01 PROCEDURE — 34705 EVAC RPR A-BIILIAC NDGFT: CPT | Mod: GC | Performed by: SURGERY

## 2021-12-01 PROCEDURE — 258N000003 HC RX IP 258 OP 636: Performed by: SURGERY

## 2021-12-01 PROCEDURE — 258N000003 HC RX IP 258 OP 636: Performed by: NURSE ANESTHETIST, CERTIFIED REGISTERED

## 2021-12-01 PROCEDURE — 999N000182 XR SURGERY CARM FLUORO GREATER THAN 5 MIN

## 2021-12-01 PROCEDURE — 99207 PR CDG-CODE CATEGORY CHANGED: CPT | Performed by: FAMILY MEDICINE

## 2021-12-01 PROCEDURE — 85610 PROTHROMBIN TIME: CPT | Performed by: SURGERY

## 2021-12-01 PROCEDURE — 255N000002 HC RX 255 OP 636: Performed by: SURGERY

## 2021-12-01 PROCEDURE — 258N000003 HC RX IP 258 OP 636: Performed by: ANESTHESIOLOGY

## 2021-12-01 PROCEDURE — 86850 RBC ANTIBODY SCREEN: CPT | Performed by: SURGERY

## 2021-12-01 PROCEDURE — 04V03DZ RESTRICTION OF ABDOMINAL AORTA WITH INTRALUMINAL DEVICE, PERCUTANEOUS APPROACH: ICD-10-PCS | Performed by: SURGERY

## 2021-12-01 PROCEDURE — C1894 INTRO/SHEATH, NON-LASER: HCPCS | Performed by: SURGERY

## 2021-12-01 PROCEDURE — 93005 ELECTROCARDIOGRAM TRACING: CPT

## 2021-12-01 PROCEDURE — 360N000084 HC SURGERY LEVEL 4 W/ FLUORO, PER MIN: Performed by: SURGERY

## 2021-12-01 PROCEDURE — C1887 CATHETER, GUIDING: HCPCS | Performed by: SURGERY

## 2021-12-01 PROCEDURE — 85049 AUTOMATED PLATELET COUNT: CPT | Performed by: SURGERY

## 2021-12-01 PROCEDURE — 250N000009 HC RX 250: Performed by: NURSE ANESTHETIST, CERTIFIED REGISTERED

## 2021-12-01 PROCEDURE — 710N000009 HC RECOVERY PHASE 1, LEVEL 1, PER MIN: Performed by: SURGERY

## 2021-12-01 PROCEDURE — 85730 THROMBOPLASTIN TIME PARTIAL: CPT | Performed by: SURGERY

## 2021-12-01 PROCEDURE — 250N000011 HC RX IP 250 OP 636: Performed by: ANESTHESIOLOGY

## 2021-12-01 PROCEDURE — 93010 ELECTROCARDIOGRAM REPORT: CPT | Performed by: INTERNAL MEDICINE

## 2021-12-01 PROCEDURE — 84132 ASSAY OF SERUM POTASSIUM: CPT | Performed by: SURGERY

## 2021-12-01 PROCEDURE — 250N000013 HC RX MED GY IP 250 OP 250 PS 637: Performed by: SURGERY

## 2021-12-01 DEVICE — EXCLUDER AAA ENDO CONTRA LEG 16MMX27MMX12CM 15FR
Type: IMPLANTABLE DEVICE | Site: ILIAC/FEMORALS | Status: FUNCTIONAL
Brand: GORE EXCLUDER AAA ENDOPROSTHESIS

## 2021-12-01 RX ORDER — PHENYLEPHRINE HYDROCHLORIDE 10 MG/ML
INJECTION INTRAVENOUS PRN
Status: DISCONTINUED | OUTPATIENT
Start: 2021-12-01 | End: 2021-12-01

## 2021-12-01 RX ORDER — ONDANSETRON 2 MG/ML
INJECTION INTRAMUSCULAR; INTRAVENOUS PRN
Status: DISCONTINUED | OUTPATIENT
Start: 2021-12-01 | End: 2021-12-01

## 2021-12-01 RX ORDER — SODIUM CHLORIDE, SODIUM LACTATE, POTASSIUM CHLORIDE, CALCIUM CHLORIDE 600; 310; 30; 20 MG/100ML; MG/100ML; MG/100ML; MG/100ML
INJECTION, SOLUTION INTRAVENOUS CONTINUOUS
Status: DISCONTINUED | OUTPATIENT
Start: 2021-12-01 | End: 2021-12-02 | Stop reason: HOSPADM

## 2021-12-01 RX ORDER — BISACODYL 10 MG
10 SUPPOSITORY, RECTAL RECTAL DAILY PRN
Status: DISCONTINUED | OUTPATIENT
Start: 2021-12-01 | End: 2021-12-02 | Stop reason: HOSPADM

## 2021-12-01 RX ORDER — PROPOFOL 10 MG/ML
INJECTION, EMULSION INTRAVENOUS PRN
Status: DISCONTINUED | OUTPATIENT
Start: 2021-12-01 | End: 2021-12-01

## 2021-12-01 RX ORDER — BUPIVACAINE HYDROCHLORIDE 2.5 MG/ML
INJECTION, SOLUTION INFILTRATION; PERINEURAL PRN
Status: DISCONTINUED | OUTPATIENT
Start: 2021-12-01 | End: 2021-12-01 | Stop reason: HOSPADM

## 2021-12-01 RX ORDER — NALOXONE HYDROCHLORIDE 0.4 MG/ML
0.2 INJECTION, SOLUTION INTRAMUSCULAR; INTRAVENOUS; SUBCUTANEOUS
Status: DISCONTINUED | OUTPATIENT
Start: 2021-12-01 | End: 2021-12-02 | Stop reason: HOSPADM

## 2021-12-01 RX ORDER — SODIUM CHLORIDE, SODIUM LACTATE, POTASSIUM CHLORIDE, CALCIUM CHLORIDE 600; 310; 30; 20 MG/100ML; MG/100ML; MG/100ML; MG/100ML
INJECTION, SOLUTION INTRAVENOUS CONTINUOUS
Status: DISCONTINUED | OUTPATIENT
Start: 2021-12-01 | End: 2021-12-01 | Stop reason: HOSPADM

## 2021-12-01 RX ORDER — ONDANSETRON 4 MG/1
4 TABLET, ORALLY DISINTEGRATING ORAL EVERY 30 MIN PRN
Status: DISCONTINUED | OUTPATIENT
Start: 2021-12-01 | End: 2021-12-01 | Stop reason: HOSPADM

## 2021-12-01 RX ORDER — EPHEDRINE SULFATE 50 MG/ML
INJECTION, SOLUTION INTRAMUSCULAR; INTRAVENOUS; SUBCUTANEOUS PRN
Status: DISCONTINUED | OUTPATIENT
Start: 2021-12-01 | End: 2021-12-01

## 2021-12-01 RX ORDER — HYDRALAZINE HYDROCHLORIDE 25 MG/1
25 TABLET, FILM COATED ORAL EVERY 6 HOURS PRN
Status: DISCONTINUED | OUTPATIENT
Start: 2021-12-01 | End: 2021-12-02 | Stop reason: HOSPADM

## 2021-12-01 RX ORDER — NALOXONE HYDROCHLORIDE 0.4 MG/ML
0.4 INJECTION, SOLUTION INTRAMUSCULAR; INTRAVENOUS; SUBCUTANEOUS
Status: DISCONTINUED | OUTPATIENT
Start: 2021-12-01 | End: 2021-12-02 | Stop reason: HOSPADM

## 2021-12-01 RX ORDER — LIDOCAINE HYDROCHLORIDE 20 MG/ML
JELLY TOPICAL EVERY 4 HOURS PRN
Status: DISCONTINUED | OUTPATIENT
Start: 2021-12-01 | End: 2021-12-02 | Stop reason: HOSPADM

## 2021-12-01 RX ORDER — HEPARIN SODIUM 1000 [USP'U]/ML
INJECTION, SOLUTION INTRAVENOUS; SUBCUTANEOUS PRN
Status: DISCONTINUED | OUTPATIENT
Start: 2021-12-01 | End: 2021-12-01

## 2021-12-01 RX ORDER — LIDOCAINE 50 MG/G
OINTMENT TOPICAL 4 TIMES DAILY
Status: DISCONTINUED | OUTPATIENT
Start: 2021-12-01 | End: 2021-12-02 | Stop reason: HOSPADM

## 2021-12-01 RX ORDER — LIDOCAINE 40 MG/G
CREAM TOPICAL
Status: DISCONTINUED | OUTPATIENT
Start: 2021-12-01 | End: 2021-12-02 | Stop reason: HOSPADM

## 2021-12-01 RX ORDER — TRAMADOL HYDROCHLORIDE 50 MG/1
50 TABLET ORAL EVERY 6 HOURS PRN
Status: DISCONTINUED | OUTPATIENT
Start: 2021-12-01 | End: 2021-12-02 | Stop reason: HOSPADM

## 2021-12-01 RX ORDER — SODIUM CHLORIDE, SODIUM LACTATE, POTASSIUM CHLORIDE, CALCIUM CHLORIDE 600; 310; 30; 20 MG/100ML; MG/100ML; MG/100ML; MG/100ML
INJECTION, SOLUTION INTRAVENOUS CONTINUOUS PRN
Status: DISCONTINUED | OUTPATIENT
Start: 2021-12-01 | End: 2021-12-01

## 2021-12-01 RX ORDER — LIDOCAINE HYDROCHLORIDE 20 MG/ML
INJECTION, SOLUTION INFILTRATION; PERINEURAL PRN
Status: DISCONTINUED | OUTPATIENT
Start: 2021-12-01 | End: 2021-12-01

## 2021-12-01 RX ORDER — ZINC SULFATE 50(220)MG
220 CAPSULE ORAL DAILY
Status: DISCONTINUED | OUTPATIENT
Start: 2021-12-01 | End: 2021-12-02 | Stop reason: HOSPADM

## 2021-12-01 RX ORDER — FENTANYL CITRATE 50 UG/ML
INJECTION, SOLUTION INTRAMUSCULAR; INTRAVENOUS PRN
Status: DISCONTINUED | OUTPATIENT
Start: 2021-12-01 | End: 2021-12-01

## 2021-12-01 RX ORDER — HYDROMORPHONE HCL IN WATER/PF 6 MG/30 ML
0.4 PATIENT CONTROLLED ANALGESIA SYRINGE INTRAVENOUS
Status: DISCONTINUED | OUTPATIENT
Start: 2021-12-01 | End: 2021-12-02 | Stop reason: HOSPADM

## 2021-12-01 RX ORDER — ATENOLOL 100 MG/1
100 TABLET ORAL DAILY
Status: ON HOLD | COMMUNITY
End: 2021-12-02

## 2021-12-01 RX ORDER — PROCHLORPERAZINE MALEATE 5 MG
5 TABLET ORAL EVERY 6 HOURS PRN
Status: DISCONTINUED | OUTPATIENT
Start: 2021-12-01 | End: 2021-12-02 | Stop reason: HOSPADM

## 2021-12-01 RX ORDER — ONDANSETRON 2 MG/ML
4 INJECTION INTRAMUSCULAR; INTRAVENOUS EVERY 6 HOURS PRN
Status: DISCONTINUED | OUTPATIENT
Start: 2021-12-01 | End: 2021-12-02 | Stop reason: HOSPADM

## 2021-12-01 RX ORDER — ATORVASTATIN CALCIUM 10 MG/1
20 TABLET, FILM COATED ORAL EVERY EVENING
Status: DISCONTINUED | OUTPATIENT
Start: 2021-12-02 | End: 2021-12-02 | Stop reason: HOSPADM

## 2021-12-01 RX ORDER — ACETAMINOPHEN 325 MG/1
975 TABLET ORAL EVERY 8 HOURS
Status: DISCONTINUED | OUTPATIENT
Start: 2021-12-01 | End: 2021-12-02 | Stop reason: HOSPADM

## 2021-12-01 RX ORDER — ONDANSETRON 4 MG/1
4 TABLET, ORALLY DISINTEGRATING ORAL EVERY 6 HOURS PRN
Status: DISCONTINUED | OUTPATIENT
Start: 2021-12-01 | End: 2021-12-02 | Stop reason: HOSPADM

## 2021-12-01 RX ORDER — TAMSULOSIN HYDROCHLORIDE 0.4 MG/1
0.4 CAPSULE ORAL DAILY
Status: DISCONTINUED | OUTPATIENT
Start: 2021-12-01 | End: 2021-12-02 | Stop reason: HOSPADM

## 2021-12-01 RX ORDER — MAGNESIUM HYDROXIDE/ALUMINUM HYDROXICE/SIMETHICONE 120; 1200; 1200 MG/30ML; MG/30ML; MG/30ML
30 SUSPENSION ORAL EVERY 4 HOURS PRN
Status: DISCONTINUED | OUTPATIENT
Start: 2021-12-01 | End: 2021-12-02 | Stop reason: HOSPADM

## 2021-12-01 RX ORDER — FENTANYL CITRATE 50 UG/ML
25 INJECTION, SOLUTION INTRAMUSCULAR; INTRAVENOUS EVERY 5 MIN PRN
Status: DISCONTINUED | OUTPATIENT
Start: 2021-12-01 | End: 2021-12-01 | Stop reason: HOSPADM

## 2021-12-01 RX ORDER — DOCUSATE SODIUM 100 MG/1
100 CAPSULE, LIQUID FILLED ORAL DAILY
Status: DISCONTINUED | OUTPATIENT
Start: 2021-12-01 | End: 2021-12-02 | Stop reason: HOSPADM

## 2021-12-01 RX ORDER — LIDOCAINE 40 MG/G
CREAM TOPICAL
Status: DISCONTINUED | OUTPATIENT
Start: 2021-12-01 | End: 2021-12-01 | Stop reason: HOSPADM

## 2021-12-01 RX ORDER — NICOTINE 21 MG/24HR
1 PATCH, TRANSDERMAL 24 HOURS TRANSDERMAL DAILY PRN
Status: DISCONTINUED | OUTPATIENT
Start: 2021-12-01 | End: 2021-12-02 | Stop reason: HOSPADM

## 2021-12-01 RX ORDER — HYDROMORPHONE HCL IN WATER/PF 6 MG/30 ML
0.2 PATIENT CONTROLLED ANALGESIA SYRINGE INTRAVENOUS EVERY 5 MIN PRN
Status: DISCONTINUED | OUTPATIENT
Start: 2021-12-01 | End: 2021-12-01 | Stop reason: HOSPADM

## 2021-12-01 RX ORDER — ASPIRIN 81 MG/1
81 TABLET ORAL DAILY
Status: DISCONTINUED | OUTPATIENT
Start: 2021-12-02 | End: 2021-12-02 | Stop reason: HOSPADM

## 2021-12-01 RX ORDER — HEPARIN SODIUM 200 [USP'U]/100ML
INJECTION, SOLUTION INTRAVENOUS PRN
Status: DISCONTINUED | OUTPATIENT
Start: 2021-12-01 | End: 2021-12-01 | Stop reason: HOSPADM

## 2021-12-01 RX ORDER — ONDANSETRON 2 MG/ML
4 INJECTION INTRAMUSCULAR; INTRAVENOUS EVERY 30 MIN PRN
Status: DISCONTINUED | OUTPATIENT
Start: 2021-12-01 | End: 2021-12-01 | Stop reason: HOSPADM

## 2021-12-01 RX ORDER — HYDROMORPHONE HCL IN WATER/PF 6 MG/30 ML
0.2 PATIENT CONTROLLED ANALGESIA SYRINGE INTRAVENOUS
Status: DISCONTINUED | OUTPATIENT
Start: 2021-12-01 | End: 2021-12-02 | Stop reason: HOSPADM

## 2021-12-01 RX ORDER — CEFAZOLIN SODIUM 2 G/100ML
2 INJECTION, SOLUTION INTRAVENOUS EVERY 8 HOURS
Status: COMPLETED | OUTPATIENT
Start: 2021-12-01 | End: 2021-12-02

## 2021-12-01 RX ORDER — MAGNESIUM SULFATE 4 G/50ML
4 INJECTION INTRAVENOUS ONCE
Status: COMPLETED | OUTPATIENT
Start: 2021-12-01 | End: 2021-12-01

## 2021-12-01 RX ORDER — ATENOLOL 25 MG/1
75 TABLET ORAL DAILY
Status: DISCONTINUED | OUTPATIENT
Start: 2021-12-02 | End: 2021-12-02 | Stop reason: HOSPADM

## 2021-12-01 RX ORDER — GABAPENTIN 100 MG/1
100 CAPSULE ORAL 2 TIMES DAILY
Status: DISCONTINUED | OUTPATIENT
Start: 2021-12-01 | End: 2021-12-02 | Stop reason: HOSPADM

## 2021-12-01 RX ORDER — ALBUMIN, HUMAN INJ 5% 5 %
SOLUTION INTRAVENOUS CONTINUOUS PRN
Status: DISCONTINUED | OUTPATIENT
Start: 2021-12-01 | End: 2021-12-01

## 2021-12-01 RX ORDER — ATENOLOL 25 MG/1
100 TABLET ORAL DAILY
Status: DISCONTINUED | OUTPATIENT
Start: 2021-12-02 | End: 2021-12-01

## 2021-12-01 RX ORDER — CEFAZOLIN SODIUM 2 G/100ML
2 INJECTION, SOLUTION INTRAVENOUS
Status: DISCONTINUED | OUTPATIENT
Start: 2021-12-01 | End: 2021-12-01 | Stop reason: HOSPADM

## 2021-12-01 RX ORDER — POLYETHYLENE GLYCOL 3350 17 G/17G
17 POWDER, FOR SOLUTION ORAL DAILY
Status: DISCONTINUED | OUTPATIENT
Start: 2021-12-02 | End: 2021-12-02 | Stop reason: HOSPADM

## 2021-12-01 RX ORDER — CEFAZOLIN SODIUM 2 G/100ML
2 INJECTION, SOLUTION INTRAVENOUS SEE ADMIN INSTRUCTIONS
Status: DISCONTINUED | OUTPATIENT
Start: 2021-12-01 | End: 2021-12-01 | Stop reason: HOSPADM

## 2021-12-01 RX ORDER — METHOCARBAMOL 500 MG/1
500 TABLET, FILM COATED ORAL 4 TIMES DAILY PRN
Status: DISCONTINUED | OUTPATIENT
Start: 2021-12-01 | End: 2021-12-02 | Stop reason: HOSPADM

## 2021-12-01 RX ORDER — AMOXICILLIN 250 MG
1 CAPSULE ORAL 2 TIMES DAILY
Status: DISCONTINUED | OUTPATIENT
Start: 2021-12-01 | End: 2021-12-02 | Stop reason: HOSPADM

## 2021-12-01 RX ORDER — OXYCODONE HYDROCHLORIDE 5 MG/1
5 TABLET ORAL EVERY 4 HOURS PRN
Status: DISCONTINUED | OUTPATIENT
Start: 2021-12-01 | End: 2021-12-01 | Stop reason: HOSPADM

## 2021-12-01 RX ORDER — HEPARIN SODIUM 5000 [USP'U]/.5ML
5000 INJECTION, SOLUTION INTRAVENOUS; SUBCUTANEOUS EVERY 8 HOURS
Status: DISCONTINUED | OUTPATIENT
Start: 2021-12-02 | End: 2021-12-02 | Stop reason: HOSPADM

## 2021-12-01 RX ORDER — DEXMEDETOMIDINE HYDROCHLORIDE 4 UG/ML
.2-1 INJECTION, SOLUTION INTRAVENOUS CONTINUOUS
Status: DISCONTINUED | OUTPATIENT
Start: 2021-12-01 | End: 2021-12-01 | Stop reason: HOSPADM

## 2021-12-01 RX ORDER — ACETAMINOPHEN 325 MG/1
650 TABLET ORAL EVERY 4 HOURS PRN
Status: DISCONTINUED | OUTPATIENT
Start: 2021-12-04 | End: 2021-12-02 | Stop reason: HOSPADM

## 2021-12-01 RX ADMIN — CEFAZOLIN SODIUM 2 G: 2 INJECTION, SOLUTION INTRAVENOUS at 11:52

## 2021-12-01 RX ADMIN — Medication 125 MCG: at 19:08

## 2021-12-01 RX ADMIN — FENTANYL CITRATE 100 MCG: 50 INJECTION, SOLUTION INTRAMUSCULAR; INTRAVENOUS at 07:57

## 2021-12-01 RX ADMIN — Medication 5 MG: at 08:57

## 2021-12-01 RX ADMIN — PHENYLEPHRINE HYDROCHLORIDE 100 MCG: 10 INJECTION INTRAVENOUS at 08:58

## 2021-12-01 RX ADMIN — HEPARIN SODIUM 8000 UNITS: 1000 INJECTION, SOLUTION INTRAVENOUS; SUBCUTANEOUS at 09:10

## 2021-12-01 RX ADMIN — PROPOFOL 200 MG: 10 INJECTION, EMULSION INTRAVENOUS at 07:57

## 2021-12-01 RX ADMIN — SODIUM CHLORIDE, POTASSIUM CHLORIDE, SODIUM LACTATE AND CALCIUM CHLORIDE: 600; 310; 30; 20 INJECTION, SOLUTION INTRAVENOUS at 07:27

## 2021-12-01 RX ADMIN — ONDANSETRON 4 MG: 2 INJECTION INTRAMUSCULAR; INTRAVENOUS at 12:23

## 2021-12-01 RX ADMIN — SODIUM CHLORIDE, POTASSIUM CHLORIDE, SODIUM LACTATE AND CALCIUM CHLORIDE: 600; 310; 30; 20 INJECTION, SOLUTION INTRAVENOUS at 10:02

## 2021-12-01 RX ADMIN — ACETAMINOPHEN 975 MG: 325 TABLET ORAL at 13:39

## 2021-12-01 RX ADMIN — ALBUMIN HUMAN: 0.05 INJECTION, SOLUTION INTRAVENOUS at 08:18

## 2021-12-01 RX ADMIN — HEPARIN SODIUM 2000 UNITS: 1000 INJECTION, SOLUTION INTRAVENOUS; SUBCUTANEOUS at 09:26

## 2021-12-01 RX ADMIN — Medication 5 MG: at 08:28

## 2021-12-01 RX ADMIN — FENTANYL CITRATE 100 MCG: 50 INJECTION, SOLUTION INTRAMUSCULAR; INTRAVENOUS at 08:51

## 2021-12-01 RX ADMIN — PHENYLEPHRINE HYDROCHLORIDE 100 MCG: 10 INJECTION INTRAVENOUS at 08:09

## 2021-12-01 RX ADMIN — DOCUSATE SODIUM 100 MG: 100 CAPSULE, LIQUID FILLED ORAL at 19:02

## 2021-12-01 RX ADMIN — Medication 5 MG: at 09:02

## 2021-12-01 RX ADMIN — MIDAZOLAM HYDROCHLORIDE 2 MG: 1 INJECTION, SOLUTION INTRAMUSCULAR; INTRAVENOUS at 07:36

## 2021-12-01 RX ADMIN — GABAPENTIN 100 MG: 100 CAPSULE ORAL at 19:12

## 2021-12-01 RX ADMIN — ROCURONIUM BROMIDE 20 MG: 50 INJECTION, SOLUTION INTRAVENOUS at 11:08

## 2021-12-01 RX ADMIN — ROCURONIUM BROMIDE 50 MG: 50 INJECTION, SOLUTION INTRAVENOUS at 08:54

## 2021-12-01 RX ADMIN — HYDROMORPHONE HYDROCHLORIDE 0.2 MG: 0.2 INJECTION, SOLUTION INTRAMUSCULAR; INTRAVENOUS; SUBCUTANEOUS at 13:15

## 2021-12-01 RX ADMIN — CEFAZOLIN SODIUM 2 G: 2 INJECTION, SOLUTION INTRAVENOUS at 19:17

## 2021-12-01 RX ADMIN — SUGAMMADEX 220 MG: 100 INJECTION, SOLUTION INTRAVENOUS at 12:20

## 2021-12-01 RX ADMIN — HEPARIN SODIUM 2000 UNITS: 1000 INJECTION, SOLUTION INTRAVENOUS; SUBCUTANEOUS at 10:34

## 2021-12-01 RX ADMIN — PHENYLEPHRINE HYDROCHLORIDE 0.1 MCG/KG/MIN: 10 INJECTION INTRAVENOUS at 08:29

## 2021-12-01 RX ADMIN — Medication 2000 MG: at 19:08

## 2021-12-01 RX ADMIN — SODIUM CHLORIDE, POTASSIUM CHLORIDE, SODIUM LACTATE AND CALCIUM CHLORIDE: 600; 310; 30; 20 INJECTION, SOLUTION INTRAVENOUS at 11:47

## 2021-12-01 RX ADMIN — PHENYLEPHRINE HYDROCHLORIDE 100 MCG: 10 INJECTION INTRAVENOUS at 08:29

## 2021-12-01 RX ADMIN — ROCURONIUM BROMIDE 30 MG: 50 INJECTION, SOLUTION INTRAVENOUS at 10:02

## 2021-12-01 RX ADMIN — Medication 2.5 MG: at 11:12

## 2021-12-01 RX ADMIN — Medication 5 MG: at 09:16

## 2021-12-01 RX ADMIN — SODIUM CHLORIDE, POTASSIUM CHLORIDE, SODIUM LACTATE AND CALCIUM CHLORIDE: 600; 310; 30; 20 INJECTION, SOLUTION INTRAVENOUS at 15:12

## 2021-12-01 RX ADMIN — LIDOCAINE HYDROCHLORIDE 30 MG: 20 INJECTION, SOLUTION INFILTRATION; PERINEURAL at 07:56

## 2021-12-01 RX ADMIN — LIDOCAINE HYDROCHLORIDE: 20 JELLY TOPICAL at 22:02

## 2021-12-01 RX ADMIN — CEFAZOLIN SODIUM 2 G: 2 INJECTION, SOLUTION INTRAVENOUS at 07:52

## 2021-12-01 RX ADMIN — DOCUSATE SODIUM 50 MG AND SENNOSIDES 8.6 MG 1 TABLET: 8.6; 5 TABLET, FILM COATED ORAL at 21:42

## 2021-12-01 RX ADMIN — SODIUM CHLORIDE, POTASSIUM CHLORIDE, SODIUM LACTATE AND CALCIUM CHLORIDE: 600; 310; 30; 20 INJECTION, SOLUTION INTRAVENOUS at 06:51

## 2021-12-01 RX ADMIN — Medication 2.5 MG: at 11:18

## 2021-12-01 RX ADMIN — ROCURONIUM BROMIDE 50 MG: 50 INJECTION, SOLUTION INTRAVENOUS at 07:58

## 2021-12-01 RX ADMIN — ACETAMINOPHEN 975 MG: 325 TABLET ORAL at 21:41

## 2021-12-01 RX ADMIN — METHOCARBAMOL 500 MG: 500 TABLET, FILM COATED ORAL at 19:08

## 2021-12-01 RX ADMIN — HEPARIN SODIUM 3000 UNITS: 1000 INJECTION, SOLUTION INTRAVENOUS; SUBCUTANEOUS at 11:11

## 2021-12-01 RX ADMIN — PHENYLEPHRINE HYDROCHLORIDE 100 MCG: 10 INJECTION INTRAVENOUS at 08:20

## 2021-12-01 RX ADMIN — MAGNESIUM SULFATE HEPTAHYDRATE 4 G: 80 INJECTION, SOLUTION INTRAVENOUS at 06:53

## 2021-12-01 RX ADMIN — HYDROMORPHONE HYDROCHLORIDE 0.4 MG: 0.2 INJECTION, SOLUTION INTRAMUSCULAR; INTRAVENOUS; SUBCUTANEOUS at 15:11

## 2021-12-01 RX ADMIN — PHENYLEPHRINE HYDROCHLORIDE 100 MCG: 10 INJECTION INTRAVENOUS at 08:04

## 2021-12-01 ASSESSMENT — ACTIVITIES OF DAILY LIVING (ADL)
ADLS_ACUITY_SCORE: 6
ADLS_ACUITY_SCORE: 4
ADLS_ACUITY_SCORE: 6
ADLS_ACUITY_SCORE: 8
ADLS_ACUITY_SCORE: 8
ADLS_ACUITY_SCORE: 6
ADLS_ACUITY_SCORE: 8
ADLS_ACUITY_SCORE: 6

## 2021-12-01 ASSESSMENT — LIFESTYLE VARIABLES: TOBACCO_USE: 1

## 2021-12-01 ASSESSMENT — MIFFLIN-ST. JEOR: SCORE: 1894.36

## 2021-12-01 ASSESSMENT — COPD QUESTIONNAIRES
COPD: 1
CAT_SEVERITY: MILD

## 2021-12-01 NOTE — OP NOTE
Operative Note    Name: Jose Bailey     Location: [facility] Main OR    Procedure Date:  12/1/2021    PCP:  No Ref-Primary, Physician    Procedure(s):  Endovascular repair of abdominal aortic aneurysm with endograft using 2 docking limbs.  Large-bore access closure x2.     Pre-Procedure Diagnosis:    Enlarging abdominal aortic aneurysm (AAA) (H) [I71.4]     Post-Procedure Diagnosis:    Same     Surgeon(s):  Danii Duckworth MD Faizer, Rumi, MD     Findings:    On initial bilateral lower extremity arterial duplex Intra-Op:  Mildly ectatic right common femoral artery with heavy calcium burden in its distal end and involving the origin of the SFA.  Not critically stenosed however.  Normal biphasic waveforms in the common femoral artery and superficial femoral artery.    Normal caliber left common femoral artery and superficial femoral artery with widely patent origin to the SFA on this side and no important disease other than some smooth posterior wall plaque.  Normal biphasic waveforms in the common femoral artery and superficial femoral artery on the side as well    On ultrasound-guided vascular access: Access gained at the 12 o'clock position under ultrasound guidance in each groin and images sent to the PACS system.  On the right side this was a 12:00 stick on the left side it was slightly to the right of this close to the 1:00 stick.    A retrograde angiogram was performed from the left iliac system because of difficulty advancing catheters.  This showed focal large abdominal aortic aneurysm and patent iliac arteries bilaterally without evidence of stenosis.    Our initial aortogram revealed patent SMA and renal arteries bilaterally with very angulated neck as noted on prior CT scan and large abdominal aortic aneurysm.    Endograft placed was a [place] conformable device as noted below.  Diameter was 20.5 mm proximal neck.  Puff angiograms from the aorta level were performed to help position the graft proximally and  landed immediately below both renal arteries.    Iliac angiograms performed showing iliac bifurcations well and helping us marked these.    Completion angiogram was performed showing good positioning of the endograft immediately below both renal arteries and good iliac seal zone with maintain patency of both internal and external iliac arteries.  There was no evidence of type II endoleak whatsoever.    Completion duplex after removal of probe advises revealed good positioning of the Pro-glide in the anterior wall of the artery with maintained biphasic waveforms in the common femoral arteries and superficial femoral arteries bilaterally and no active extravasation, flow luminal compromise, or pseudoaneurysm formation.    On completion the patient had palpable dorsalis pedis pulses as he had the start of the case  Heparin:  [id number]    Estimated Blood Loss:   200 cc's    Specimens:    * No specimens in log *      Implants:  Implant Name Type Inv. Item Serial No.  Lot No. LRB No. Used Action   [place] excluder conformable aaa endoprosthesis   [id number]  REF: [id number] N/A 1 Implanted   GORE EXCLUDER AAA Endoprosthesis   [id number]  Left 1 Implanted   GORE EXCLUDER AAA Endoprosthesis   [id number] GORE  Right 1 Implanted        Complications:    * No complications entered in OR log *     Brief Clinical Hx:  This is 66-year-old gentleman with prior abdominal surgery for sigmoid colectomy for diverticulitis and a known midline incisional hernia for which she has had bowel obstruction intermittently.  On imaging was recognized to have an almost 8 cm abdominal aortic aneurysm with a very angulated neck.  Normal heart stress test and despite lifelong smoking reasonable PFTs.  That said the patient has a longstanding history of chronic fatigue which she has blamed on Lyme disease despite lack of blood test confirmation.  In this context I felt he had a poor chance for recovery from open aortic aneurysm repair.  While  his anatomy is not ideal for endovascular pair to the tortuosity of the vessel and the large aneurysm after careful consideration we decided to move forward with this approach with patient support.  stable    Operative Details:  Patient was prepped and draped for access to his abdomen and thighs after ultrasound was performed in the operating room and femoral access points were marked out.    Under ultrasound guidance skin nicks were made and needle access was gained to the common femoral artery on each side.  Wire advanced up the iliac arteries was confirmed with fluoroscopy and we then exchanged and 7 [place] sheath on each side.  These were exchanged to Pro-glide devices were deployed in each side and the preclose fashion.  At this point 9 [place] sheaths were introduced.  From the left groin we attempted to advance a cath and wire.  This was somewhat challenging and we performed a retrograde angiogram to confirm that there was no dissection or other concern.  As noted above there was none.  Nonetheless it was challenging to advance through the aneurysm and into the tortuous neck of the aorta.  We were eventually able to do this after exchanging in a 12 [place] sheath on the left side.  We could not easily select the left renal artery as we had intended and instead placed a pigtail catheter on the side.  From the right side we were able to advance up and exchange and an 18 [place] dry seal sheath.  With this in place over stiff wire we now performed our initial aortogram with the findings noted above, namely very tortuous neck but clearly visible renal arteries.  From the right side we exchanged in the endograft main body which we deployed immediately below the renal arteries using a couple of puff injections from the pigtail catheter.  Only assessed whether using the conform ability and angulation the graft was helpful and did not appear to change things much and it appeared that we were very nicely positioned  immediately below both renal arteries.    When undeployed the endograft exposure of the contralateral gate.  At this point we attempted to cannulate the gate from the left groin access.  This proved to be extremely challenging into the majority of the case.  Eventually we were able to gain access using a steerable sheath in conjunction with a ELIS 1 catheter and a stiff angled glide.  That said we could not advance up the gait more than 2 cm as the gait limb was compressed by the distal end of the neck of the aneurysm.    At this point we completed deployment of the entire endograft main body and retrieve the delivery system.  Over the wire we advanced a Reliant balloon and ballooned the proximal fixation site of the graft right down to the flow divider of the endograft.  By doing this this appeared to spread open the orifice of the contralateral gate.    With this done we were able to advance a wire up through the gate and through the main body of the endograft into the native aorta above.  We were then able to exchange in a [place] wire.  With this wire in place we are now able to introduce marker catheter and measure for the length of the contralateral limb required using a retrograde angiogram from the sheath level.  We chose this limb and deployed without difficulty to the iliac bifurcation.  We retrieve the delivery system leaving a 12 [place] sheath in place.    We.now performed the same for the right side measuring for the ipsilateral limb required with the sheath angiogram and introducing this limb and applying it without difficulty to the iliac bifurcation.  At this point we ballooned the proximal junctions of the graft limbs of the endograft body as well as the distal seal zones.    A completion angiogram was performed showing good positioning of the endograft and no endoleak whatsoever.    At this point we retrieved our sheaths and deployed a Pro-glide devices over a wire.  Once we confirmed that this was  successfully removed the wire and tightened down the Pro-glide vices deploying them without difficulty.  Completion duplex was performed with the findings noted above, namely good positioning of the Pro-glide sutures in the anterior wall of the artery and no active extravasation or flow luminal compromise.    The access sites were infiltrated with Marcaine 0.25 and the skin was closed with Dermabond.    On completion the patient had palpable pedal pulses.  This was a very long case due to the difficulty with gait cannulation but otherwise was without any complication.      Sheryl Fontanez MD     Date: 12/1/2021  Time:12:28 PM

## 2021-12-01 NOTE — ANESTHESIA POSTPROCEDURE EVALUATION
Patient: Jose Bailey    Procedure: Procedure(s):  Endovascular repair of abdominal aortic aneurysm with endograft using 2 docking limbs.  Large-bore access closure x2.       Diagnosis:Enlarging abdominal aortic aneurysm (AAA) (H) [I71.4]  Diagnosis Additional Information: No value filed.    Anesthesia Type:  General    Note:  Disposition: Inpatient   Postop Pain Control: Uneventful            Sign Out: Well controlled pain   PONV: No   Neuro/Psych: Uneventful            Sign Out: Acceptable/Baseline neuro status   Airway/Respiratory: Uneventful            Sign Out: Acceptable/Baseline resp. status   CV/Hemodynamics: Uneventful            Sign Out: Acceptable CV status; No obvious hypovolemia; No obvious fluid overload   Other NRE: NONE   DID A NON-ROUTINE EVENT OCCUR? No           Last vitals:  Vitals Value Taken Time   /86 12/01/21 1345   Temp 36.5  C (97.7  F) 12/01/21 1345   Pulse 63 12/01/21 1359   Resp 30 12/01/21 1359   SpO2 97 % 12/01/21 1359   Vitals shown include unvalidated device data.    Electronically Signed By: Ryan Navarrete MD  December 1, 2021  3:49 PM

## 2021-12-01 NOTE — ANESTHESIA PREPROCEDURE EVALUATION
Anesthesia Pre-Procedure Evaluation    Patient: Jose Bailey   MRN: [id number] : 1955        Preoperative Diagnosis: Enlarging abdominal aortic aneurysm (AAA) (H) [I71.4]    Procedure : Procedure(s):  Endovascular repair of abdominal aortic aneurysm with endograft using 2 docking limbs.  Large-bore access closure x2.  Selective renal artery catheterization.          Past Medical History:   Diagnosis Date     AAA (abdominal aortic aneurysm) (H)      Diverticulitis       Past Surgical History:   Procedure Laterality Date     COLECTOMY       CYSTOSCOPY        No Known Allergies   Social History     Tobacco Use     Smoking status: Current Every Day Smoker     Packs/day: 1.50     Years: 53.00     Pack years: 79.50     Types: Cigarettes     Start date:      Smokeless tobacco: Never Used     Tobacco comment: seen by TTS 10/18/2021   Substance Use Topics     Alcohol use: Not Currently      Wt Readings from Last 1 Encounters:   21 108.4 kg (239 lb)        Anesthesia Evaluation   Pt has had prior anesthetic. Type: General.        ROS/MED HX  ENT/Pulmonary:     (+) sleep apnea, tobacco use, Current use, 1 packs/day, 50  Pack-Year Hx,  mild,  COPD,     Neurologic:       Cardiovascular:     (+) hypertension-Peripheral Vascular Disease-- Non Symptomatic. ---Previous cardiac testing     METS/Exercise Tolerance:     Hematologic:       Musculoskeletal:   (+) arthritis,     GI/Hepatic:       Renal/Genitourinary:     (+) renal disease, type: CRI, Pt does not require dialysis, BPH,     Endo:       Psychiatric/Substance Use:       Infectious Disease:       Malignancy:       Other:            Physical Exam    Airway  airway exam normal      Mallampati: II   TM distance: > 3 FB   Neck ROM: full   Mouth opening: > 3 cm    Respiratory Devices and Support         Dental  no notable dental history     (+) lower dentures and upper dentures      Cardiovascular   cardiovascular exam normal       Rhythm and rate: regular and  normal     Pulmonary   pulmonary exam normal        breath sounds clear to auscultation           OUTSIDE LABS:  CBC:   Lab Results   Component Value Date    WBC 6.5 12/01/2021    WBC 8.0 10/18/2021    HGB 14.1 12/01/2021    HGB 13.2 (L) 10/18/2021    HCT 42.6 12/01/2021    HCT 41.0 10/18/2021     12/01/2021     10/18/2021     BMP:   Lab Results   Component Value Date     10/18/2021     10/17/2021    POTASSIUM 4.5 12/01/2021    POTASSIUM 4.1 10/18/2021    CHLORIDE 112 (H) 10/18/2021    CHLORIDE 107 10/17/2021    CO2 26 10/18/2021    CO2 28 10/17/2021    BUN 25 (H) 10/18/2021    BUN 34 (H) 10/17/2021    CR 1.63 (H) 12/01/2021    CR 1.09 10/18/2021    GLC 85 10/18/2021    GLC 94 10/17/2021     COAGS:   Lab Results   Component Value Date    PTT 40 (H) 12/01/2021    INR 0.97 12/01/2021     POC: No results found for: BGM, HCG, HCGS  HEPATIC:   Lab Results   Component Value Date    ALBUMIN 3.3 (L) 10/16/2021    PROTTOTAL 7.0 10/16/2021    ALT 16 10/16/2021    AST 18 10/16/2021    ALKPHOS 75 10/16/2021    BILITOTAL 1.3 (H) 10/16/2021     OTHER:   Lab Results   Component Value Date    GRIS 8.8 10/18/2021    MAG 2.1 10/18/2021       Anesthesia Plan    ASA Status:  3   NPO Status:  NPO Appropriate    Anesthesia Type: General.     - Airway: ETT   Induction: Propofol, Intravenous.   Maintenance: Inhalation.   Techniques and Equipment:     - Airway: Video-Laryngoscope     - Lines/Monitors: 2nd IV, Arterial Line     Consents    Anesthesia Plan(s) and associated risks, benefits, and realistic alternatives discussed. Questions answered and patient/representative(s) expressed understanding.    - Discussed:     - Discussed with:  Patient    Use of blood products discussed: Yes.     - Discussed with: Patient.     Postoperative Care    Pain management: IV analgesics.   PONV prophylaxis: Ondansetron (or other 5HT-3), Dexamethasone or Solumedrol     Comments:                Ryan Navarrete MD

## 2021-12-01 NOTE — CONSULTS
Assessment & Plan   66-year-old male with CKD 3, HTN presented for endovascular repair of AAA with vascular surgery.  [facility] consulted for management of hypertension, chronic renal disease.    Active Problems:    Essential hypertension    Obstructive sleep apnea syndrome    Benign prostatic hyperplasia with urinary obstruction    THOMPSON (acute kidney injury) (H)    CKD (chronic kidney disease) stage 3, GFR 30-59 ml/min (H)    Tobacco use disorder    Abdominal aortic aneurysm (AAA) (H)    Pain of right upper arm    Present on Admission:    Abdominal aortic aneurysm (AAA) (H)      AAA  -Patient presented for planned endovascular repair of AAA with endograft with vascular surgery on 12/1/2021.  -ASA 81 mg p.o. daily  -Atorvastatin 20 mg p.o. 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  Had been doing well at home with Voltaren gel as well as turmeric supplements.  Some acute muscle spasm in the setting of endovascular procedure where patient's arm was held in supinated position for multiple hours.  -Voltaren lidocaine gel treating on and off 4 times daily  -Tylenol as needed  -Methocarbamol as needed  -Ice to the right upper extremity  -Would recommend outpatient PT    Ventral wall hernia  -Stable, no signs of obstruction or incarceration.  -Patient has outpatient general surgery follow up.    HTN  -Atenolol 100 mg p.o. daily  -Hold home HCTZ 25 mg p.o. daily    BPH  -May hold home saw palmetto    CONNIE  -CPAP    Tobacco use disorder  -Patient had been smoking 1 pack/day  -Nicotine supplements as needed    Electrolytes: None currently available  Fluids: LR at  "125 mL/HR per vascular surgery  Diet: Advance per vascular surgery  VTE prophylaxis: heparin SQ      COVID19 symptom check 12/1/2021  Fevers/Chills/myalgias: NEGATIVE TO ALL  Sick contacts/COVID19 exposure: NEGATIVE TO ALL  Cough/trouble breathing/SOB/sore throat: NEGATIVE TO ALL  Diarrhea: NEGATIVE       Subjective  Cc: Hypertension and chronic renal disease    Pt is new to be today.  Personally conducted extensive chart review prior to visit including labs, imaging, past provider notes and interventions.  Patient states he is feeling relatively well after procedure.  Is having more pain in his right arm, feels like the muscle is spasming.  States the arm and been doing much better outpatient as he been using Voltaren gel as well as an herbal supplement called \"Protandim\".  Patient also with many questions about his abdominal hernia, surgical planning in regards to this now that AAA has been fixed.  Denies chest pain, cough, fevers or chills.  Currently without abdominal pain.    Review of Systems: History obtained from the patient  General ROS: negative  for  - chills, fatigue, fever  ENT ROS: negative for - headaches, hearing change, nasal congestion, nasal discharge  Hematological and Lymphatic ROS: negative for - bleeding problems, blood clots, bruising  Respiratory ROS: negative for - cough, pleuritic pain, shortness of breath  Cardiovascular ROS: negative for - chest pain, dyspnea on exertion, edema  Gastrointestinal ROS: negative for - abdominal pain, constipation, diarrhea, and nausea/vomiting  Genito-Urinary ROS: negative for - dysuria, hematuria  Musculoskeletal ROS: Positive for right shoulder and arm pain  Neurological ROS: negative for - behavioral changes, confusion, dizziness, numbness/tingling   Dermatological ROS: negative for pruritus, rash    Objective    Physical Examination:   General appearance - alert, well appearing and appears older than stated age, and in no distress and oriented to " person, place, and time  Mental status - alert, oriented to person, place, and time, normal mood, behavior, raspy voice but fluent speech, dress, motor activity, and thought processes  HEENT - sclera anicteric, left eye normal, right eye normal, nares normal and patent, no erythema,   Lymphatics - no palpable lymphadenopathy, no hepatosplenomegaly  Respiratory - clear to auscultation, no wheezes, rales or rhonchi, symmetric air entry, no tachypnea, retractions or cyanosis  Cardiac - normal rate, regular rhythm, normal S1, S2, no murmurs, rubs, clicks or gallops, no JVD  Abdomen - soft, nontender, medium sized midline reducible and soft mass palpated, bowel sounds hypoactive in all 4 quadrants, no bladder distension noted  Neurological - alert, oriented, normal speech, no focal findings or movement disorder noted  Musculoskeletal -right biceps muscle with firm and tender nodule in the center of the muscle belly.  No pain palpated around the right AC joint, right glenohumeral joint.  Extremities - peripheral pulses normal, no pedal edema, no clubbing or cyanosis  Skin - normal coloration and turgor, no rashes, no suspicious skin lesions noted    Temp:  [97.7  F (36.5  C)-98.5  F (36.9  C)] 97.7  F (36.5  C)  Pulse:  [45-59] 56  Resp:  [0-18] 16  BP: (139-187)/(66-86) 139/66  MAP:  [0 mmHg-146 mmHg] 146 mmHg  Arterial Line BP: (0-163)/(0-70) 158/68  SpO2:  [91 %-100 %] 97 %      Intake/Output Summary (Last 24 hours) at 12/1/2021 1549  Last data filed at 12/1/2021 1400  Gross per 24 hour   Intake 2490 ml   Output 1050 ml   Net 1440 ml       Results    Recent Results (from the past 24 hour(s))   Creatinine    Collection Time: 12/01/21  6:00 AM   Result Value Ref Range    Creatinine 1.63 (H) 0.70 - 1.30 mg/dL    GFR Estimate 43 (L) >60 mL/min/1.73m2   INR    Collection Time: 12/01/21  6:00 AM   Result Value Ref Range    INR 0.97 0.90 - 1.15   Partial thromboplastin time    Collection Time: 12/01/21  6:00 AM   Result Value  Ref Range    aPTT 40 (H) 22 - 38 Seconds   Potassium    Collection Time: 12/01/21  6:00 AM   Result Value Ref Range    Potassium 4.5 3.5 - 5.0 mmol/L   Adult Type and Screen    Collection Time: 12/01/21  6:00 AM   Result Value Ref Range    ABO/RH(D) O POS     Antibody Screen Negative Negative    SPECIMEN EXPIRATION DATE [id number]    CBC with platelets and differential    Collection Time: 12/01/21  6:00 AM   Result Value Ref Range    WBC Count 6.5 4.0 - 11.0 10e3/uL    RBC Count 4.42 4.40 - 5.90 10e6/uL    Hemoglobin 14.1 13.3 - 17.7 g/dL    Hematocrit 42.6 40.0 - 53.0 %    MCV 96 78 - 100 fL    MCH 31.9 26.5 - 33.0 pg    MCHC 33.1 31.5 - 36.5 g/dL    RDW 13.8 10.0 - 15.0 %    Platelet Count 317 150 - 450 10e3/uL    % Neutrophils 50 %    % Lymphocytes 29 %    % Monocytes 13 %    % Eosinophils 6 %    % Basophils 1 %    % Immature Granulocytes 1 %    NRBCs per 100 WBC 0 <1 /100    Absolute Neutrophils 3.4 1.6 - 8.3 10e3/uL    Absolute Lymphocytes 1.9 0.8 - 5.3 10e3/uL    Absolute Monocytes 0.8 0.0 - 1.3 10e3/uL    Absolute Eosinophils 0.4 0.0 - 0.7 10e3/uL    Absolute Basophils 0.0 0.0 - 0.2 10e3/uL    Absolute Immature Granulocytes 0.0 <=0.4 10e3/uL    Absolute NRBCs 0.0 10e3/uL   Platelet count    Collection Time: 12/01/21  3:31 PM   Result Value Ref Range    Platelet Count 280 150 - 450 10e3/uL          Lab Results personally reviewed 12/1  Imaging Results personally reviewed 12/1  Discussed with patient  Reviewed old records     Advanced Care Planning  Discharge Planning discussed with patient and   Discussed care with patient for 45 minutes with greater than 50% of time spent in counseling and coordination of care.        This note was created using dragon dictation, any spelling and grammatical errors are unintentional.

## 2021-12-01 NOTE — ANESTHESIA PROCEDURE NOTES
Arterial Line Procedure Note    Pre-Procedure   Staff -        Anesthesiologist:  Ryan Navarrete MD       Performed By: anesthesiologist       Location: OR       Procedure Start/Stop Times: 12/1/2021 8:05 AM and 12/1/2021 8:12 AM       Pre-Anesthestic Checklist: patient identified, IV checked, risks and benefits discussed, informed consent, monitors and equipment checked, pre-op evaluation and at physician/surgeon's request  Timeout:       Correct Patient: Yes        Correct Procedure: Yes        Correct Site: Yes        Correct Position: Yes   Procedure   Procedure: arterial line       Diagnosis: aortic aneurysm       Laterality: left       Insertion Site: radial.  Sterile Prep        Standard elements of sterile barrier followed       Skin prep: Chloraprep  Insertion/Injection        Technique: Dean's test completed and Seldinger Technique        Catheter Type/Size: 20 G, 12 cm  Narrative         Secured by: suture       Tegaderm and Biopatch dressing used.       Complications: None apparent,        Arterial waveform: Yes        IBP within 10% of NIBP: Yes

## 2021-12-01 NOTE — PHARMACY-ADMISSION MEDICATION HISTORY
Pharmacy Note - Admission Medication History     ______________________________________________________________________    Prior To Admission (PTA) med list completed and updated in EMR.       PTA Med List   Medication Sig Note Last Dose     ascorbic acid, vitamin C, (VITAMIN C) 1000 MG tablet Take 2,000 mg by mouth daily   11/30/2021     atenolol (TENORMIN) 100 MG tablet Take 100 mg by mouth daily  11/30/2021     b complex vitamins (B COMPLEX 1) tablet Take 1 tablet by mouth daily   11/30/2021     Charcoal Activated (ACTIVATED CHARCOAL PO) Take 2 capsules by mouth daily  11/30/2021     cholecalciferol (VITAMIN D3) 125 mcg (5000 units) capsule Take 125 mcg by mouth daily  11/30/2021     docusate sodium (STOOL SOFTENER) 100 MG capsule Take 100 mg by mouth daily   11/30/2021     fish oil-omega-3 fatty acids 1000 MG capsule Take 4 g by mouth 2 times daily  11/30/2021     hydrochlorothiazide (HYDRODIURIL) 25 MG tablet Take 1 tablet (25 mg) by mouth daily 11/30/2021: Hold DOS 11/30/2021     Milk Thistle 175 MG CAPS Take 2 capsules by mouth daily  11/30/2021     Misc Natural Products (URINOZINC PROSTATE PO) Take 1 capsule by mouth 2 times daily   11/30/2021     QUERCETIN PO Take 1 tablet by mouth daily  11/30/2021     saw palmetto fruit 450 mg cap Take 900 mg by mouth daily   11/30/2021     UNABLE TO FIND Take 1 tablet by mouth 2 times daily MEDICATION NAME: Protandim  11/30/2021     UNABLE TO FIND Chaga  11/30/2021     UNABLE TO FIND Vitamin: Special 2  11/30/2021     Zinc 100 MG TABS Take 100 mg by mouth daily  11/30/2021       Information source(s): Patient, Clinic records and CareEverywhere/[facility]    Method of interview communication: in-person    Patient was asked about OTC/herbal products specifically.  PTA med list reflects this.    Based on the pharmacist's assessment, the PTA med list information appears reliable    Allergies were reviewed, assessed, and updated with the patient.      Patient does not use  any multi-dose medications prior to admission.     Thank you for the opportunity to participate in the care of this patient.      Santi Maldonado formerly [facility]     12/1/2021     6:55 AM

## 2021-12-01 NOTE — ANESTHESIA CARE TRANSFER NOTE
Patient: Jose Bailey    Procedure: Procedure(s):  Endovascular repair of abdominal aortic aneurysm with endograft using 2 docking limbs.  Large-bore access closure x2.       Diagnosis: Enlarging abdominal aortic aneurysm (AAA) (H) [I71.4]  Diagnosis Additional Information: No value filed.    Anesthesia Type:   General     Note:    Oropharynx: oropharynx clear of all foreign objects and spontaneously breathing  Level of Consciousness: awake  Oxygen Supplementation: face mask  Level of Supplemental Oxygen (L/min / FiO2): 6  Independent Airway: airway patency satisfactory and stable    Vital Signs Stable: post-procedure vital signs reviewed and stable  Report to RN Given: handoff report given  Patient transferred to: PACU  Comments: .  .  .  Wide awake and alert. Requires O2 to maintain sats above 90%. He has been encouraged to cough and deep breath which shows improvement. Will leave the O2 on for now via SFM.  .  .  .    Handoff Report: Identifed the Patient, Identified the Reponsible Provider, Reviewed the pertinent medical history, Discussed the surgical course, Reviewed Intra-OP anesthesia mangement and issues during anesthesia, Set expectations for post-procedure period and Allowed opportunity for questions and acknowledgement of understanding      Vitals:  Vitals Value Taken Time   /66 12/01/21 1246   Temp 37 C    Pulse 55 12/01/21 1249   Resp 12 12/01/21 1249   SpO2 99 % 12/01/21 1249   Vitals shown include unvalidated device data.    Electronically Signed By: PHILIP Boyd CRNA  December 1, 2021  12:50 PM

## 2021-12-01 NOTE — ANESTHESIA PROCEDURE NOTES
Airway         Procedure Start/Stop Times: 12/1/2021 7:59 AM  Staff -        CRNA: Bong Villalobos APRN CRNA       Performed By: CRNA  Consent for Airway        Urgency: elective  Indications and Patient Condition       Indications for airway management: prashant-procedural       Induction type:intravenous       Mask difficulty assessment: 1 - vent by mask    Final Airway Details       Final airway type: endotracheal airway       Successful airway: ETT - single  Endotracheal Airway Details        ETT size (mm): 7.5       Cuffed: yes       Successful intubation technique: direct laryngoscopy       DL Blade Type: Alfred 3       Grade View of Cords: 1       Adjucts: stylet       Position: Right       Measured from: lips       Secured at (cm): 22       Bite block used: None    Post intubation assessment        Placement verified by: capnometry, equal breath sounds and chest rise        Number of attempts at approach: 1       Number of other approaches attempted: 0       Secured with: silk tape       Ease of procedure: easy       Dentition: Intact and Unchanged

## 2021-12-02 VITALS
DIASTOLIC BLOOD PRESSURE: 67 MMHG | SYSTOLIC BLOOD PRESSURE: 158 MMHG | RESPIRATION RATE: 18 BRPM | TEMPERATURE: 98.6 F | BODY MASS INDEX: 32.44 KG/M2 | WEIGHT: 239.5 LBS | HEIGHT: 72 IN | OXYGEN SATURATION: 95 % | HEART RATE: 52 BPM

## 2021-12-02 LAB
ALBUMIN UR-MCNC: 20 MG/DL
ANION GAP SERPL CALCULATED.3IONS-SCNC: 9 MMOL/L (ref 5–18)
APPEARANCE UR: CLEAR
ATRIAL RATE - MUSE: 43 BPM
ATRIAL RATE - MUSE: 54 BPM
BASOPHILS # BLD AUTO: 0 10E3/UL (ref 0–0.2)
BASOPHILS NFR BLD AUTO: 0 %
BILIRUB UR QL STRIP: NEGATIVE
BUN SERPL-MCNC: 22 MG/DL (ref 8–22)
CALCIUM SERPL-MCNC: 9.2 MG/DL (ref 8.5–10.5)
CHLORIDE BLD-SCNC: 101 MMOL/L (ref 98–107)
CO2 SERPL-SCNC: 27 MMOL/L (ref 22–31)
COLOR UR AUTO: ABNORMAL
CREAT SERPL-MCNC: 1.48 MG/DL (ref 0.7–1.3)
DIASTOLIC BLOOD PRESSURE - MUSE: NORMAL MMHG
DIASTOLIC BLOOD PRESSURE - MUSE: NORMAL MMHG
EOSINOPHIL # BLD AUTO: 0.1 10E3/UL (ref 0–0.7)
EOSINOPHIL NFR BLD AUTO: 1 %
ERYTHROCYTE [DISTWIDTH] IN BLOOD BY AUTOMATED COUNT: 13.9 % (ref 10–15)
GFR SERPL CREATININE-BSD FRML MDRD: 48 ML/MIN/1.73M2
GLUCOSE BLD-MCNC: 142 MG/DL (ref 70–125)
GLUCOSE BLDC GLUCOMTR-MCNC: 88 MG/DL (ref 70–99)
GLUCOSE BLDC GLUCOMTR-MCNC: 99 MG/DL (ref 70–99)
GLUCOSE UR STRIP-MCNC: NEGATIVE MG/DL
HCT VFR BLD AUTO: 36.8 % (ref 40–53)
HGB BLD-MCNC: 11.9 G/DL (ref 13.3–17.7)
HGB UR QL STRIP: ABNORMAL
IMM GRANULOCYTES # BLD: 0 10E3/UL
IMM GRANULOCYTES NFR BLD: 0 %
INTERPRETATION ECG - MUSE: NORMAL
INTERPRETATION ECG - MUSE: NORMAL
KETONES UR STRIP-MCNC: NEGATIVE MG/DL
LEUKOCYTE ESTERASE UR QL STRIP: NEGATIVE
LYMPHOCYTES # BLD AUTO: 1.6 10E3/UL (ref 0.8–5.3)
LYMPHOCYTES NFR BLD AUTO: 16 %
MCH RBC QN AUTO: 32 PG (ref 26.5–33)
MCHC RBC AUTO-ENTMCNC: 32.3 G/DL (ref 31.5–36.5)
MCV RBC AUTO: 99 FL (ref 78–100)
MONOCYTES # BLD AUTO: 0.5 10E3/UL (ref 0–1.3)
MONOCYTES NFR BLD AUTO: 5 %
NEUTROPHILS # BLD AUTO: 8.2 10E3/UL (ref 1.6–8.3)
NEUTROPHILS NFR BLD AUTO: 78 %
NITRATE UR QL: NEGATIVE
NRBC # BLD AUTO: 0 10E3/UL
NRBC BLD AUTO-RTO: 0 /100
P AXIS - MUSE: 105 DEGREES
P AXIS - MUSE: 81 DEGREES
PH UR STRIP: 5.5 [PH] (ref 5–7)
PLATELET # BLD AUTO: 249 10E3/UL (ref 150–450)
POTASSIUM BLD-SCNC: 4.1 MMOL/L (ref 3.5–5)
PR INTERVAL - MUSE: 182 MS
PR INTERVAL - MUSE: 188 MS
QRS DURATION - MUSE: 102 MS
QRS DURATION - MUSE: 94 MS
QT - MUSE: 436 MS
QT - MUSE: 466 MS
QTC - MUSE: 393 MS
QTC - MUSE: 413 MS
R AXIS - MUSE: 37 DEGREES
R AXIS - MUSE: 39 DEGREES
RBC # BLD AUTO: 3.72 10E6/UL (ref 4.4–5.9)
RBC URINE: 9 /HPF
SODIUM SERPL-SCNC: 137 MMOL/L (ref 136–145)
SP GR UR STRIP: 1.02 (ref 1–1.03)
SYSTOLIC BLOOD PRESSURE - MUSE: NORMAL MMHG
SYSTOLIC BLOOD PRESSURE - MUSE: NORMAL MMHG
T AXIS - MUSE: 42 DEGREES
T AXIS - MUSE: 53 DEGREES
UROBILINOGEN UR STRIP-MCNC: <2 MG/DL
VENTRICULAR RATE- MUSE: 43 BPM
VENTRICULAR RATE- MUSE: 54 BPM
WBC # BLD AUTO: 10.5 10E3/UL (ref 4–11)
WBC URINE: 2 /HPF

## 2021-12-02 PROCEDURE — 85025 COMPLETE CBC W/AUTO DIFF WBC: CPT | Performed by: SURGERY

## 2021-12-02 PROCEDURE — 93010 ELECTROCARDIOGRAM REPORT: CPT | Performed by: INTERNAL MEDICINE

## 2021-12-02 PROCEDURE — 250N000013 HC RX MED GY IP 250 OP 250 PS 637: Performed by: FAMILY MEDICINE

## 2021-12-02 PROCEDURE — 81001 URINALYSIS AUTO W/SCOPE: CPT | Performed by: FAMILY MEDICINE

## 2021-12-02 PROCEDURE — 36415 COLL VENOUS BLD VENIPUNCTURE: CPT | Performed by: SURGERY

## 2021-12-02 PROCEDURE — 99207 PR CDG-MDM COMPONENT: MEETS MODERATE - DOWN CODED: CPT | Performed by: HOSPITALIST

## 2021-12-02 PROCEDURE — 99232 SBSQ HOSP IP/OBS MODERATE 35: CPT | Performed by: HOSPITALIST

## 2021-12-02 PROCEDURE — 250N000013 HC RX MED GY IP 250 OP 250 PS 637: Performed by: SURGERY

## 2021-12-02 PROCEDURE — 250N000011 HC RX IP 250 OP 636: Performed by: SURGERY

## 2021-12-02 PROCEDURE — 82310 ASSAY OF CALCIUM: CPT | Performed by: SURGERY

## 2021-12-02 PROCEDURE — 93005 ELECTROCARDIOGRAM TRACING: CPT

## 2021-12-02 RX ORDER — DOXAZOSIN 1 MG/1
1 TABLET ORAL AT BEDTIME
Qty: 30 TABLET | Refills: 0 | Status: ON HOLD | OUTPATIENT
Start: 2021-12-02 | End: 2022-04-14

## 2021-12-02 RX ORDER — ACETAMINOPHEN 325 MG/1
650 TABLET ORAL EVERY 4 HOURS PRN
COMMUNITY
Start: 2021-12-04 | End: 2022-07-07

## 2021-12-02 RX ORDER — HYDROCHLOROTHIAZIDE 25 MG/1
25 TABLET ORAL DAILY
Qty: 90 TABLET | Refills: 1 | COMMUNITY
Start: 2021-12-09 | End: 2022-05-23

## 2021-12-02 RX ORDER — ATORVASTATIN CALCIUM 20 MG/1
20 TABLET, FILM COATED ORAL EVERY EVENING
Qty: 30 TABLET | Refills: 11 | Status: ON HOLD | OUTPATIENT
Start: 2021-12-02 | End: 2022-04-14

## 2021-12-02 RX ADMIN — HEPARIN SODIUM 5000 UNITS: 5000 INJECTION, SOLUTION INTRAVENOUS; SUBCUTANEOUS at 08:17

## 2021-12-02 RX ADMIN — DICLOFENAC SODIUM 2 G: 10 GEL TOPICAL at 12:50

## 2021-12-02 RX ADMIN — DOCUSATE SODIUM 50 MG AND SENNOSIDES 8.6 MG 1 TABLET: 8.6; 5 TABLET, FILM COATED ORAL at 08:08

## 2021-12-02 RX ADMIN — HEPARIN SODIUM 5000 UNITS: 5000 INJECTION, SOLUTION INTRAVENOUS; SUBCUTANEOUS at 16:35

## 2021-12-02 RX ADMIN — DICLOFENAC SODIUM 2 G: 10 GEL TOPICAL at 16:35

## 2021-12-02 RX ADMIN — DICLOFENAC SODIUM 2 G: 10 GEL TOPICAL at 08:24

## 2021-12-02 RX ADMIN — ACETAMINOPHEN 975 MG: 325 TABLET ORAL at 16:33

## 2021-12-02 RX ADMIN — GABAPENTIN 100 MG: 100 CAPSULE ORAL at 08:08

## 2021-12-02 RX ADMIN — CEFAZOLIN SODIUM 2 G: 2 INJECTION, SOLUTION INTRAVENOUS at 04:03

## 2021-12-02 RX ADMIN — DOCUSATE SODIUM 100 MG: 100 CAPSULE, LIQUID FILLED ORAL at 08:08

## 2021-12-02 RX ADMIN — Medication 220 MG: at 12:50

## 2021-12-02 RX ADMIN — Medication 2000 MG: at 08:11

## 2021-12-02 RX ADMIN — Medication 125 MCG: at 08:08

## 2021-12-02 RX ADMIN — ASPIRIN 81 MG: 81 TABLET, COATED ORAL at 08:09

## 2021-12-02 RX ADMIN — ACETAMINOPHEN 975 MG: 325 TABLET ORAL at 06:03

## 2021-12-02 ASSESSMENT — ACTIVITIES OF DAILY LIVING (ADL)
ADLS_ACUITY_SCORE: 6
ADLS_ACUITY_SCORE: 8
ADLS_ACUITY_SCORE: 6
ADLS_ACUITY_SCORE: 8
ADLS_ACUITY_SCORE: 8
ADLS_ACUITY_SCORE: 6
ADLS_ACUITY_SCORE: 8
ADLS_ACUITY_SCORE: 6
ADLS_ACUITY_SCORE: 8
ADLS_ACUITY_SCORE: 8
ADLS_ACUITY_SCORE: 6
ADLS_ACUITY_SCORE: 8
ADLS_ACUITY_SCORE: 6

## 2021-12-02 NOTE — PROGRESS NOTES
Pt bladder scanned for 500cc.  Pt able to void 75cc, not feeling like he emptied bladder.    Placed call to MD for Urojet for straight cath.  MD did not receive first text.  Resent text received order.  Bladder scanned again prior to sraight cath over 600cc.  Used urojet prior to straight cath, 800 cc output clear eliezer urine.  Pt tolerated procedure fairly, had some discomfort.      Pt HR was found to be as low as 40.  MD notified and would like to be alerted to HR less than 40.

## 2021-12-02 NOTE — PROGRESS NOTES
.  Hospitalist Progress Note    Assessment/Plan    Active Problems:    Essential hypertension    Obstructive sleep apnea syndrome    Benign prostatic hyperplasia with urinary obstruction    THOMPSON (acute kidney injury) (H)    CKD (chronic kidney disease) stage 3, GFR 30-59 ml/min (H)    Tobacco use disorder    Abdominal aortic aneurysm (AAA) (H)    Pain of right upper arm    66-year-old male with CKD 3, HTN presented for endovascular repair of AAA with vascular surgery.  [facility] consulted for management of hypertension, chronic renal disease.     Active Problems:    Essential hypertension    Obstructive sleep apnea syndrome    Benign prostatic hyperplasia with urinary obstruction    THOMPSON (acute kidney injury) (H)    CKD (chronic kidney disease) stage 3, GFR 30-59 ml/min (H)    Tobacco use disorder    Abdominal aortic aneurysm (AAA) (H)    Pain of right upper arm     Present on Admission:    Abdominal aortic aneurysm (AAA) (H)        AAA  -Patient presented for planned endovascular repair of AAA with endograft with vascular surgery on 12/1/2021.  -Postoperative management per vascular surgery  -ASA 81 mg p.o. daily  -Atorvastatin 20 mg p.o. daily     THOMPSON on CKD 3  -Baseline creatinine appears to be close to 1.4, currently 1.63  -PTA hydrochlorothiazide held during his stay and on discharge. Pt will need BMP check in 1 week at PCP office and can likely resume if creat ok and BP high.      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  Had been doing well at home with Voltaren gel as well as turmeric supplements.  Some acute muscle spasm in the setting of endovascular procedure where patient's arm was held in supinated position for multiple hours.  -Continue Tylenol as needed  -Ice to the right upper extremity  -Out-pt PT ordered on discharge     Ventral wall hernia  -Stable, no signs of  obstruction or incarceration.  -Patient has outpatient general surgery follow up, continue plan as prior     HTN- BP in rage of 140's.  -Noted bradycardia, HR in 40's which is his baseline per .  -Atenolol discontinued. hydrochlorothiazide held given THOMPSON.  -Pt to follow up with PCP in 1 week, if creat ok and BP high can resume hydrochlorothiazide.    BPH  -May hold home saw palmetto  -Pt was started on flomax but states he has experienced side effects from it, doesn't remember exactly the effects.   -Trial of Cardura on discharge.     CONNIE  -CPAP     Tobacco use disorder  -Patient had been smoking 1 pack/day  -Nicotine supplements as needed    Barriers to Discharge:  None    Anticipated discharge date/Disposition: Today    Subjective  Patient is new to me. Chart reviewed and events noted.  Patient seen and examined.   Sitting in chair.Denies any dizziness, palpitations. Pt HR on tele showed 36, did EKG , HR in 40's which is likely his baseline. His atenolol held this AM and will be discontinued on discharge.      Objective    Vital signs in last 24 hours  Temp:  [97.6  F (36.4  C)-98.5  F (36.9  C)] 98.5  F (36.9  C)  Pulse:  [44-64] 45  Resp:  [16-18] 18  BP: (122-165)/(58-90) 137/63  SpO2:  [90 %-99 %] 97 % [unfilled] O2 Device: None (Room air)    Weight:   [unfilled] Weight change: 0.227 kg (8 oz)    Intake/Output last 3 shifts  I/O last 3 completed shifts:  In: 3680 [P.O.:1430; I.V.:2000]  Out: 2075 [Urine:1875; Blood:200]  Body mass index is 32.48 kg/m .    Physical Exam    General Appearance:    Alert, cooperative, no distress, appears stated age   Lungs:     CTAB   Cardiovascular:    RRR   Abdomen:     Soft, non-tender, ventral hernia+ and reducible.   Neurologic:   Grossly normal     Pertinent Labs   Lab Results: personally reviewed.   Recent Labs   Lab 12/02/21  1009      CO2 27   BUN 22     Recent Labs   Lab 12/02/21  1009 12/01/21  1531 12/01/21  0600   WBC 10.5  --  6.5   HGB 11.9*  --   14.1   HCT 36.8*  --  42.6    280 317     No results for input(s): CKTOTAL, TROPONINI in the last 168 hours.    Invalid input(s): TROPONINT, CKMBINDEX  Invalid input(s): POCGLUFGR    Medications  Drug and lactation database from the [place] [employer] of Medicine:  http://toxnet.nlm.nih.gov/cgi-bin/sis/htmlgen?LACT      Pertinent Radiology   Radiology Results:  Personally reviewed impressions    Reviewed labs in last 24 hours.    Advanced Care Planning:  Discharge Planning discussed with patient  Total time with this patient is 35 min with greater than 50% of time spent in coordination of care.  Care discussed and coordinated with RNBc from vascular,  from vascular team, /CM.    This Note is created using dragon voice recognition software.  Errors in spelling or words which seems out of context are unintentional.  Sounds alike errors may have escaped editing.    Carlyn Marks MD  Hospitalist

## 2021-12-02 NOTE — PLAN OF CARE
Problem: Postoperative Urinary Retention (Surgery Nonspecified)  Goal: Effective Urinary Elimination  Outcome: No Change     Problem: Pain Acute  Goal: Acceptable Pain Control and Functional Ability  Outcome: Improving     Patient denies pain on shift. Patient voided 150 mL in commode, bladder scanned another 700 mL. Straight cathed patient and got 600 mL out. Patient bradycardic on shift, fluctuating from 60 bpm to lowest of 38 bpm. Peripheral pulses +2. /61.     Estephania Meyers RN

## 2021-12-02 NOTE — DISCHARGE SUMMARY
Vascular Surgery Service Discharge Summary     NAME: Jose Bailey   MRN: [id number]   : 1955   PCP: Physician No Ref-Primary    DATE OF ADMISSION: 2021     PRE/POSTOPERATIVE DIAGNOSES:    7.8cm infrarenal abdominal aortic aneurysm    PROCEDURES PERFORMED, 2021:   Procedure/Surgery Information   Procedure: Procedure(s):  Endovascular repair of abdominal aortic aneurysm with endograft using 2 docking limbs.  Large-bore access closure x2.   Surgeon(s): Surgeon(s) and Role:     * Sheryl Fontanez MD - Primary     * Danii Duckworth MD - Assisting   Specimens: * No specimens in log *           INTRAOPERATIVE FINDINGS:   On initial bilateral lower extremity arterial duplex Intra-Op:  Mildly ectatic right common femoral artery with heavy calcium burden in its distal end and involving the origin of the SFA.  Not critically stenosed however.  Normal biphasic waveforms in the common femoral artery and superficial femoral artery.     Normal caliber left common femoral artery and superficial femoral artery with widely patent origin to the SFA on this side and no important disease other than some smooth posterior wall plaque.  Normal biphasic waveforms in the common femoral artery and superficial femoral artery on the side as well     On ultrasound-guided vascular access: Access gained at the 12 o'clock position under ultrasound guidance in each groin and images sent to the PACS system.  On the right side this was a 12:00 stick on the left side it was slightly to the right of this close to the 1:00 stick.     A retrograde angiogram was performed from the left iliac system because of difficulty advancing catheters.  This showed focal large abdominal aortic aneurysm and patent iliac arteries bilaterally without evidence of stenosis.     Our initial aortogram revealed patent SMA and renal arteries bilaterally with very angulated neck as noted on prior CT scan and large abdominal aortic aneurysm.     Endograft placed  was a [place] conformable device as noted below.  Diameter was 20.5 mm proximal neck.  Puff angiograms from the aorta level were performed to help position the graft proximally and landed immediately below both renal arteries.     Iliac angiograms performed showing iliac bifurcations well and helping us marked these.     Completion angiogram was performed showing good positioning of the endograft immediately below both renal arteries and good iliac seal zone with maintain patency of both internal and external iliac arteries.  There was no evidence of type II endoleak whatsoever.     Completion duplex after removal of probe advises revealed good positioning of the Pro-glide in the anterior wall of the artery with maintained biphasic waveforms in the common femoral arteries and superficial femoral arteries bilaterally and no active extravasation, flow luminal compromise, or pseudoaneurysm formation.     On completion the patient had palpable dorsalis pedis pulses as he had the start of the case    POSTOPERATIVE COMPLICATIONS: none    DATE OF DISCHARGE: 12/2/2021    ADMISSION HPI (from Dr. Howe note 12/1/2021):   This is 66-year-old gentleman with prior abdominal surgery for sigmoid colectomy for diverticulitis and a known midline incisional hernia for which he has had bowel obstruction intermittently. On imaging was recognized to have an almost 8 cm abdominal aortic aneurysm with a very angulated neck. Normal heart stress test and despite lifelong smoking reasonable PFTs. That said the patient has a longstanding history of chronic fatigue which she has blamed on Lyme disease despite lack of blood test confirmation. In this context I felt he had a poor chance for recovery from open aortic aneurysm repair. While his anatomy is not ideal for endovascular repair due to the tortuosity of the vessel and the large aneurysm, after careful consideration we decided to move forward with this approach with patient  support.    HOSPITAL COURSE: Jose Bailey is a 66 year old male who was admitted on 12/1/2021 and underwent endovascular aortic repair with a [place] Excluder endograft. He tolerated the procedure well and postoperatively was transferred to the general post-surgical unit. His course was complicated by urinary retention that resolved and for which he got acupuncture and a trial of doxazosin. He was also noted to be bradycardic to the 30s. His atenolol was held. He tolerated a diet, voided spontaneously, ambulated, and had good pain control with PO meds. On 12/2/2021, he was discharged to home in stable condition.    Vascular Surgery Core Measures:   Started aspirin 81mg and atorvastatin 20mg during hospitalization      PAST MEDICAL HISTORY:  Past Medical History:   Diagnosis Date     AAA (abdominal aortic aneurysm) (H)      Diverticulitis        PAST SURGICAL HISTORY:  Past Surgical History:   Procedure Laterality Date     COLECTOMY       CYSTOSCOPY       IR ABDOMINAL AORTOGRAM  12/1/2021         Labs:  Recent Labs   Lab Test 12/02/21  1009 12/01/21  1531 12/01/21  0600   WBC 10.5  --  6.5   RBC 3.72*  --  4.42   HGB 11.9*  --  14.1   HCT 36.8*  --  42.6   MCV 99  --  96   MCH 32.0  --  31.9   MCHC 32.3  --  33.1    280 317     Recent Labs   Lab Test 12/02/21  1009 12/01/21  0600 10/18/21  0805 10/17/21  0722   POTASSIUM 4.1 4.5 4.1 4.3   CHLORIDE 101  --  112* 107   BUN 22  --  25* 34*       DISCHARGE INSTRUCTIONS:  Discharge Orders      Physical Therapy Referral      Reason for your hospital stay    Endovascular repair of abdominal aortic aneurysm     Follow-up and recommended labs and tests     Follow up with Dr. Fontanez as already scheduled on 1/6 with CTA abdomen prior     Activity    Your activity upon discharge: activity as tolerated     Follow-up and recommended labs and tests     Follow up with primary care provider, Physician No Ref-Primary, within 7 days for hospital follow- up.  The following  labs/tests are recommended: CBC, BMP.  Holding hydrochlorothiazide till evaluated by PCP with repeat labs in a week.  Follow up with general surgery as out-pt for ventral hernia     Activity    Your activity upon discharge: activity as tolerated     Diet    Follow this diet upon discharge: Orders Placed This Encounter      Advance Diet as Tolerated: Regular Diet Adult     Discharge Medications   Current Discharge Medication List      START taking these medications    Details   acetaminophen (TYLENOL) 325 MG tablet Take 2 tablets (650 mg) by mouth every 4 hours as needed for other (For optimal non-opioid multimodal pain management to improve pain control.)    Associated Diagnoses: Acute post-operative pain      aspirin (ASA) 81 MG EC tablet Take 1 tablet (81 mg) by mouth daily    Associated Diagnoses: Abdominal aortic aneurysm (AAA) without rupture (H)      atorvastatin (LIPITOR) 20 MG tablet Take 1 tablet (20 mg) by mouth every evening  Qty: 30 tablet, Refills: 11    Associated Diagnoses: Abdominal aortic aneurysm (AAA) without rupture (H)      doxazosin (CARDURA) 1 MG tablet Take 1 tablet (1 mg) by mouth At Bedtime  Qty: 30 tablet, Refills: 0    Associated Diagnoses: Benign prostatic hyperplasia with urinary obstruction         CONTINUE these medications which have CHANGED    Details   hydrochlorothiazide (HYDRODIURIL) 25 MG tablet Take 1 tablet (25 mg) by mouth daily  Qty: 90 tablet, Refills: 1    Associated Diagnoses: Essential hypertension         CONTINUE these medications which have NOT CHANGED    Details   ascorbic acid, vitamin C, (VITAMIN C) 1000 MG tablet Take 2,000 mg by mouth daily       b complex vitamins (B COMPLEX 1) tablet Take 1 tablet by mouth daily       Charcoal Activated (ACTIVATED CHARCOAL PO) Take 2 capsules by mouth daily      cholecalciferol (VITAMIN D3) 125 mcg (5000 units) capsule Take 125 mcg by mouth daily      docusate sodium (STOOL SOFTENER) 100 MG capsule Take 100 mg by mouth daily        fish oil-omega-3 fatty acids 1000 MG capsule Take 4 g by mouth 2 times daily      Milk Thistle 175 MG CAPS Take 2 capsules by mouth daily      Misc Natural Products (URINOZINC PROSTATE PO) Take 1 capsule by mouth 2 times daily       QUERCETIN PO Take 1 tablet by mouth daily      saw palmetto fruit 450 mg cap Take 900 mg by mouth daily       !! UNABLE TO FIND Take 1 tablet by mouth 2 times daily MEDICATION NAME: Protandim      !! UNABLE TO FIND Chaga      !! UNABLE TO FIND Vitamin: Special 2      Zinc 100 MG TABS Take 100 mg by mouth daily       !! - Potential duplicate medications found. Please discuss with provider.      STOP taking these medications       atenolol (TENORMIN) 100 MG tablet Comments:   Reason for Stopping:             Allergies   No Known Allergies    Danii Duckworth MD  12/02/21  5:44 PM

## 2021-12-02 NOTE — SIGNIFICANT EVENT
Nursing paged about bradycardia and urinary retention.  Patient on atenolol at baseline, will reduce dose to 75mg po daily and obtain EKG to ensure sinus bradycardia    Acute urinary retention in the postoperative setting is common.  Will obtain UA/culture, straight cath as needed with urojet, start tamsulosin 0.4mg po daily

## 2021-12-02 NOTE — PLAN OF CARE
Problem: Bleeding (Surgery Nonspecified)  Goal: Absence of Bleeding  Outcome: Improving     Problem: Pain (Surgery Nonspecified)  Goal: Acceptable Pain Control  Outcome: Improving     Problem: Postoperative Urinary Retention (Surgery Nonspecified)  Goal: Effective Urinary Elimination  Outcome: Improving   Pt retaining urine after surgery, he was straight cathed.  See progress note for  details     Pt was c/o right arm and shoulder pain when hospitalist saw him.  He denied pain and declined topicals ordered for pain management.  Informed him that he can ask for medication when he needs .

## 2021-12-02 NOTE — PROGRESS NOTES
VASCULAR SURGERY PROGRESS NOTE    Subjective:  Patient is feeling well, denies any pain.  Is tolerating p.o. intake.  Difficulty voiding independently overnight, with significant urinary retention requiring straight cath x2.  He has been up ambulating without difficulty, and besides urinary retention feel stable for discharge home today.  We discussed using Flomax however patient has had side effects in the past, which he cannot entirely remember, but does not want to take Flomax nonetheless.  VSS, afebrile.     Objective:  Intake/Output Summary (Last 24 hours) at 12/2/2021 0903  Last data filed at 12/2/2021 0730  Gross per 24 hour   Intake 3430 ml   Output 2225 ml   Net 1205 ml     Labs:  ROUTINE IP LABS (Last four results)  BMP  Recent Labs   Lab 12/02/21  0753 12/01/21  0600   POTASSIUM  --  4.5   CR  --  1.63*   GLC 88  --      CBC  Recent Labs   Lab 12/01/21  1531 12/01/21  0600   WBC  --  6.5   RBC  --  4.42   HGB  --  14.1   HCT  --  42.6   MCV  --  96   MCH  --  31.9   MCHC  --  33.1   RDW  --  13.8    317     INR  Recent Labs   Lab 12/01/21  0600   INR 0.97     PHYSICAL EXAM:  BP (!) 146/72 (BP Location: Left arm)   Pulse (!) 44   Temp 97.7  F (36.5  C) (Oral)   Resp 18   Ht 1.829 m (6')   Wt 108.6 kg (239 lb 8 oz)   SpO2 98%   BMI 32.48 kg/m    General: The patient is alert and oriented. Appropriate. No acute distress  Psych: pleasant affect, answers questions appropriately  Skin: Color appropriate for race, warm, dry.  Respiratory: The patient does not require supplemental oxygen. Breathing unlabored  GI:  Abdomen soft, nontender to light palpation, obese  Extremities: Bilateral groin with palpable femoral pulses, puncture sites without ecchymosis, hematoma, or drainage.  Skin glue intact.  DP/PT pulse palpable,  no edema noted.      ASSESSMENT:  66-year-old male postop day 1 endovascular repair of abdominal aortic aneurysm with endograft using 2 docking limbs.  Large-bore access closure x2.   Doing well this morning with no complaints of pain.  Urinary retention noted requiring straight cath x2.  Patient declining the use of Flomax as he states he has had side effects in the past.  He is tolerating p.o. intake and has been up ambulating independently.  Noted to have bradycardia overnight, which has now improved.  Chronic kidney disease with baseline creatinine around 1.4, elevated at 1.63 yesterday.  Recheck today pending.    PLAN:  -Monitor ability to void independently.  Discussed with Dr. Fontanez and will consult acupuncture team to see if they can offer anything in this regard.  Patient aware of this consult.  Okay to continue straight cath until seen by acupuncture team.  If still unable to void may need to place indwelling Hernadez catheter and keep overnight with repeat void trial in the morning.   -AM labs pending, trend creatinine  -In regards to bradycardia, will defer management to hospitalist team.  May need reduced dose of atenolol at discharge.  -Tylenol for pain, not requiring narcotics.    -If able to void and stable from medicine standpoint, can likely discharge this afternoon.  -Discussed with nursing, will get discharge prepped and okay to cancel discharge order if discharge goals not met.  -Updated hospitalist via [facility] paging    Follow-up as scheduled on January 6 with abdominal CTA prior to appointment.    Bc Rose NP  Division of Vascular Surgery   [phone]    Plan of care and assessment discussed with vascular attending who is in agreement with the above.

## 2021-12-02 NOTE — CONSULTS
"ACUPUNCTURIST TREATMENT NOTE    Name: Jose Bailey  :  1955  MRN:  [id number]    Acupuncture Treatment  Patient Type: Medical  Intervention Reason: Urinary Retention  Patient complaint:: Urinary retention. Patient denies any pain. States he has some chronic back pain but it is not bothering him now.  Initial insertions: Baihui, Du24, Ki3, Sp6, Sp9, Ren6, Ren3, Ren2, St29  Number of needles inserted: 13  Number of needles removed: 13  Treatment Observations: Patient stated he relaxed well during treatment.          \"Risks and benefits of acupuncture were discussed with patient. Consent for treatment was given. We thank you for the referral.\"     Christianne Castillo L.Ac.    Date:  2021  Time:  2:29 PM    "

## 2021-12-02 NOTE — DISCHARGE INSTRUCTIONS
A new PCP has been arranged for you.  Your appointment is scheduled for  Date: 12/6/2021  Time: 9 AM  Phone: [phone]  Location: [address] [address]  Provider: Dr. Adkins

## 2021-12-02 NOTE — PROGRESS NOTES
Patient reported he does not use CPAP at home. Offered hospital CPAP, patient declining use.     Yudelka Abbasi, RT

## 2021-12-02 NOTE — CONSULTS
Care Management Initial Consult    General Information  Assessment completed with: Patient, Jose  Type of CM/SW Visit: Initial Assessment    Primary Care Provider verified and updated as needed:     Readmission within the last 30 days: no previous admission in last 30 days      Reason for Consult: discharge planning  Advance Care Planning:            Communication Assessment  Patient's communication style: spoken language (English or Bilingual)    Hearing Difficulty or Deaf: no   Wear Glasses or Blind: yes    Cognitive  Cognitive/Neuro/Behavioral: WDL                      Living Environment:   People in home: child(jessica), adult  Lalita  Current living Arrangements: house      Able to return to prior arrangements: yes       Family/Social Support:  Care provided by: self  Provides care for:       Children          Description of Support System:           Current Resources:   Patient receiving home care services:       Community Resources:    Equipment currently used at home: none  Supplies currently used at home:      Employment/Financial:  Employment Status:          Financial Concerns:             Lifestyle & Psychosocial Needs:  Social Determinants of Health     Tobacco Use: High Risk     Smoking Tobacco Use: Current Every Day Smoker     Smokeless Tobacco Use: Never Used   Alcohol Use: Not on file   Financial Resource Strain: Not on file   Food Insecurity: Not on file   Transportation Needs: Not on file   Physical Activity: Not on file   Stress: Not on file   Social Connections: Not on file   Intimate Partner Violence: Not on file   Depression: Not on file   Housing Stability: Not on file       Functional Status:  Prior to admission patient needed assistance:   NA          Mental Health Status:          Chemical Dependency Status:                Values/Beliefs:  Spiritual, Cultural Beliefs, Restorationism Practices, Values that affect care:                 Additional Information:  KAMALA spoke with Pt today to introduce CM  role, complete initial assessment and discuss discharge planning. Pt is from home with adult daughter, Lalita and is independent at baseline. Pt still drives. Pt notes that he is still out in the woods cutting down trees for fire wood. Family to transport at discharge. Discharge plan is pending response to treatment, medical needs, recommendations and mobility closer to discharge. CM to follow.     3:47 PM  Writer met with pt today as RN called to indicate that Pt would like a new PCP. Per Pt, he does not want to see Dr. Cuenca anymore. Writer asked for preference but Pt did not have any. Appointment scheduled at [facility] in [place] for Monday, Dec 6 at 9 AM with Dr. Adkins. Appointment information added to AVS.      LUCAS Espinoza

## 2021-12-02 NOTE — PROGRESS NOTES
Attempted visit this afternoon and early evening. Pt busy with cares.  Will attempt visit again tomorrow.    HERB Guo.

## 2021-12-03 ENCOUNTER — TELEPHONE (OUTPATIENT)
Dept: VASCULAR SURGERY | Facility: CLINIC | Age: 66
End: 2021-12-03
Payer: COMMERCIAL

## 2021-12-03 ENCOUNTER — PATIENT OUTREACH (OUTPATIENT)
Dept: CARE COORDINATION | Facility: CLINIC | Age: 66
End: 2021-12-03
Payer: COMMERCIAL

## 2021-12-03 DIAGNOSIS — Z71.89 OTHER SPECIFIED COUNSELING: ICD-10-CM

## 2021-12-03 NOTE — TELEPHONE ENCOUNTER
Message left for pt to see how he is doing post operatively. Wanted to chec on his blood pressure and urinary retention. Also wanted to make sure he is seeing his PCP in a week.     Requested a call back.

## 2021-12-03 NOTE — PLAN OF CARE
Denies pain.  Tolerated Regular diet without pain or nausea.  IV Sl'd and then removed at time of discharge.  Experienced urinary retention last liya and overnight. Resolved today-patient able to void, bladder scanned for 170. Has void 300cc multiple different times. Had accupunture for retention. Not sure if this really helped as it was already resoving prior to therapy. Refused flomax this morning stating he experienced adverse effects from it years ago when he took it. Patient asked if there was something different he could take-MD started on cardura at discharge.  Patient ran Bradycardic most of the day, dropping into 30s-40s at times. Was on telemetry running NSR or Sinus Hay. MD aware, this is patient's baseline. Atenolol was held this morning and discontinued at discharge. Hydrochlorothiazide on hold and BP and HR can be checked at follow up appoint. Patient understands.  Groin sites clean, dry and intact. Sites soft, no tenderness. Left side bruised. Dermabond in place.   Labs all WNL.  Followup appointment made by care manager because patient reported he did not have a primary doctor or clinic and wanted someone closer to his home.   Patient left floor via wheelchair with NA. Daughter providing ride home-patient lives with daughter.

## 2021-12-03 NOTE — PROVIDER NOTIFICATION
Message sent to Dr Marks regarding patient running bradycardic this morning as low at 36-38 for short periods of time. Vascular medicine was made aware as well when they were rounding on patient. Patient asymptomatic. Atenolol held.

## 2021-12-03 NOTE — PROGRESS NOTES
Clinic Care Coordination Contact    Background: Care Coordination referral placed from [facility] discharge report for reason of patient meeting criteria for a TCM outreach call by [facility] team.    Assessment: Upon chart review, [facility] Team member will cancel/close the referral for TCM outreach due to reason below:    Patient has a follow up appointment with an appropriate provider today for hospital discharge    Plan: Care Coordination referral for TCM outreach canceled.    Marcia Crenshaw MA  [facility]

## 2021-12-03 NOTE — TELEPHONE ENCOUNTER
Pt has been able to void- slow to start but able to void.     Pt has not checked his blood pressure yet but will be getting a cuff today.     Encouraged good fluid intake and getting up and around.    Pt will see PCP Monday but he will update us on his BP once he has it checked.

## 2021-12-07 NOTE — TELEPHONE ENCOUNTER
"Writer spoke with pt, concern for bruising at groin incision site and a \"bump\". Writer informed him these symptoms are normal and explained the bump is probably a hematoma. He has an appt with PCP tomorrow, informed him to have that checked. He will have BP checked at that appt as well.   "

## 2021-12-07 NOTE — TELEPHONE ENCOUNTER
Patient is calling stating he has might be having some complications, he was not able to see his primary yesterday.  He is scheduled to see another primary doctor tomorrow. He is asking we call his daughter Lalita [phone] he's at her house now

## 2022-01-06 ENCOUNTER — OFFICE VISIT (OUTPATIENT)
Dept: VASCULAR SURGERY | Facility: CLINIC | Age: 67
End: 2022-01-06
Attending: SURGERY
Payer: COMMERCIAL

## 2022-01-06 ENCOUNTER — HOSPITAL ENCOUNTER (OUTPATIENT)
Dept: CT IMAGING | Facility: HOSPITAL | Age: 67
End: 2022-01-06
Attending: SURGERY
Payer: COMMERCIAL

## 2022-01-06 VITALS
DIASTOLIC BLOOD PRESSURE: 68 MMHG | HEART RATE: 54 BPM | RESPIRATION RATE: 18 BRPM | SYSTOLIC BLOOD PRESSURE: 130 MMHG | TEMPERATURE: 97.8 F

## 2022-01-06 DIAGNOSIS — I71.40 ENLARGING ABDOMINAL AORTIC ANEURYSM (AAA) (H): ICD-10-CM

## 2022-01-06 DIAGNOSIS — I71.40 ABDOMINAL AORTIC ANEURYSM (AAA) WITHOUT RUPTURE (H): Primary | ICD-10-CM

## 2022-01-06 PROCEDURE — 250N000011 HC RX IP 250 OP 636: Performed by: SURGERY

## 2022-01-06 PROCEDURE — 74174 CTA ABD&PLVS W/CONTRAST: CPT

## 2022-01-06 PROCEDURE — 99024 POSTOP FOLLOW-UP VISIT: CPT | Performed by: SURGERY

## 2022-01-06 PROCEDURE — G0463 HOSPITAL OUTPT CLINIC VISIT: HCPCS

## 2022-01-06 RX ORDER — IOPAMIDOL 755 MG/ML
75 INJECTION, SOLUTION INTRAVASCULAR ONCE
Status: COMPLETED | OUTPATIENT
Start: 2022-01-06 | End: 2022-01-06

## 2022-01-06 RX ADMIN — IOPAMIDOL 75 ML: 755 INJECTION, SOLUTION INTRAVENOUS at 11:24

## 2022-01-06 ASSESSMENT — PAIN SCALES - GENERAL: PAINLEVEL: NO PAIN (0)

## 2022-01-06 NOTE — PROGRESS NOTES
Message sent to surgery scheduling to call patient for follow up with Dr. Alba. Also provided patient their phone number.

## 2022-01-06 NOTE — PATIENT INSTRUCTIONS
Please schedule follow up with Dr. Alba  [address] [place] [address]  General Surgery appointments:[phone]       Computed Tomography Angiography (CTA)    Computed tomography angiography (CTA) is an imaging test. It uses X-rays and computer technology to make detailed pictures of your arteries. Before the test, an X-ray dye (contrast medium) is injected into your vein. The dye makes it easier to see your blood vessels on the X-ray. Pictures are then taken with the CT scanner. A computer turns the images into 2-D and 3-D pictures.      Computed tomography angiogram of carotid arteries.  CTA can make 3D images, such as the carotid arteries shown here.    Why CTA is done  CTA may be used to:      Check arteries in your belly, neck, lungs, pelvis, kidneys, or brain.    Look for a ballooning of the blood vessel wall (aneurysm) or a tear (dissection).    Check if a tube (stent) used to keep an artery open is working well.    Find damage to your arteries due to injuries.    Collect details on blood vessels that supply blood to tumors.    Getting ready for your test  Tell your healthcare provider if you:      Have diabetes    Have kidney disease    Are allergic to X-ray dye or other medicines    Are pregnant or think you may be pregnant    Are taking any medicines, herbs, or supplements, including prescription medicines, illegal drugs, and over-the-counter medicines such as aspirin or ibuprofen      Follow any directions you are given for not eating or drinking before the CTA. Follow any other instructions from your healthcare provider.      During your test    You will be asked to remove any hair clips, jewelry, false teeth, or other metal items that could show up on the X-ray.    You will lie down on the scanning table. An IV line will be put in a vein in your arm or hand.    The scanning table will be properly placed. The part of your body being checked will be inside the doughnut-shaped CT  scanner.    One image may be taken first to be sure you are in the proper position for the test.    The IV will be hooked up to an automatic injection machine. This controls how often and how fast the X-ray dye is injected. The injection may continue during part of the exam.    The dye will be put into your vein through the IV line. You may feel warmth through your body when the dye is injected.    You can t move while the X-rays are being taken. Pillows and foam pads may be used to help you stay still. You will be told to hold your breath for 10 to 25 seconds at a time.    A single scan may take several minutes. You may need more than one scan.    After your test    Drink plenty of fluids to help flush the X-ray dye from your body.    You may eat as soon as you want to.    Possible risks  All procedures have some risks. A CTA has some possible risks. These include:    Problems due to the X-ray dye, such as an allergic reaction or kidney damage    Skin damage from leaking X-ray dye near where the IV was put in      6476-2189 [employer]. [address] [place] [address]. All rights reserved. This information is not intended as a substitute for professional medical care. Always follow your healthcare professional's instructions.

## 2022-01-06 NOTE — PROGRESS NOTES
"CTA BEFORE- 1 month f/u. EVAR 12/1/21.  asa, statin    [facility]        Patient is here for a 1 month follow up  S/P EVAR on 12/1. Has been feeling \"great\" since surgery    Pt is currently taking asa, atorvastin    Questions patient would like addressed today are: Wondering what kind of medications were given during surgery. Antibiotic?     Refills are needed: No    Has homecare services and agency name:  Suzy Osborne RN    "

## 2022-01-06 NOTE — PROGRESS NOTES
Patient is status post endovascular aortic aneurysm repair on December 1 of last year.  Aneurysm discovered when he presented with bowel obstruction and a midline hernia.  Doing very well.  Really did not have any postoperative issues at all and actually felt better than he had in years for the first 12 days after the procedure.  Now back to his baseline which is involving chronic abdominal and mild groin discomfort.  No issues at all related to the operation however.  Very appreciative.    /68 (BP Location: Left arm, Patient Position: Semi-Isaac's)   Pulse 54   Temp 97.8  F (36.6  C) (Temporal)   Resp 18     CTA shows well-placed endograft.  It appears to me that there might be a very small type II endoleak on the posterior aspect at the very distal end of the neck of the aneurysm.  Otherwise good seal zones proximally and distally.  Left iliac graft and all so is somewhat compressed in the distal neck.    Impression and plan: Excellent early outcome from very tortuous endograft repair and patient was felt to be high risk for open repair due to his longstanding chronic pains.  At this point needs his midline incisional hernia repaired.  We will get him back into see Dr. Alba for this.  I will plan on seeing the patient back in 6 months time with a CTA of his abdomen and pelvis.

## 2022-01-13 NOTE — PATIENT INSTRUCTIONS
Hernia education & surgery packet provided to pt.      Renny ESTEVEZ [facility]  Surgery Clinic [facility]  [facility] Clinic - [address] [address]  Office: [phone]  Fax: [phone]

## 2022-01-17 ENCOUNTER — OFFICE VISIT (OUTPATIENT)
Dept: SURGERY | Facility: CLINIC | Age: 67
End: 2022-01-17
Payer: COMMERCIAL

## 2022-01-17 DIAGNOSIS — K43.2 INCISIONAL HERNIA, WITHOUT OBSTRUCTION OR GANGRENE: Primary | ICD-10-CM

## 2022-01-17 PROCEDURE — 99212 OFFICE O/P EST SF 10 MIN: CPT | Performed by: SURGERY

## 2022-01-17 NOTE — LETTER
1/17/2022         RE: Jose Bailey  [address]  [facility] [address]        Dear Colleague,    Thank you for referring your patient, Jose Bailey, to the [facility] [place]. Please see a copy of my visit note below.    HPI:  Jose Bailey is a 66 year old male who was referred to me by No Ref-Primary, Physician for a ventral hernia.  He presents with complaints of a bulge in the mid abdominal region with increasing pain with radiation to the left abdomen.  He recently underwent endovascular repair of a large abdominal aortic aneurysm with excellent results.  He continues to have the abdominal discomfort and states that the midline incisional bulge is progressively enlarging.  He does have intermittent nausea but denies any vomiting, fevers or chills.    Allergies:Patient has no known allergies.    Past Medical History:   Diagnosis Date     AAA (abdominal aortic aneurysm) (H)      Abdominal aortic aneurysm (AAA) (H) 12/1/2021     THOMPSON (acute kidney injury) (H) 10/17/2021     Benign prostatic hyperplasia with urinary obstruction 6/10/2008     CKD (chronic kidney disease) stage 3, GFR 30-59 ml/min (H) 10/17/2021     Diverticulitis      Enlarging abdominal aortic aneurysm (AAA) (H) 10/16/2021     Essential hypertension 5/19/2008     Irritable bowel syndrome 7/17/2008     Obstructive sleep apnea syndrome 8/28/2008     Pain of right upper arm 12/1/2021     SBO (small bowel obstruction) (H) 10/16/2021     Tobacco use disorder 10/17/2021       Past Surgical History:   Procedure Laterality Date     COLECTOMY       CYSTOSCOPY       ESTABLISHMENT, VASCULAR ACCESS, FEM APPROACH, FOR ENDOVASC STENT INSERTION INTO ABD AORTIC ANEURYSM N/A 12/1/2021    Procedure: Endovascular repair of abdominal aortic aneurysm with endograft using 2 docking limbs.  Large-bore access closure x2.;  Surgeon: Sheryl Fontanez MD;  [place] [facility] [place] [facility]      ABDOMINAL AORTOGRAM  12/1/2021        CURRENT MEDS:  Current Outpatient Medications   Medication Sig Dispense Refill     acetaminophen (TYLENOL) 325 MG tablet Take 2 tablets (650 mg) by mouth every 4 hours as needed for other (For optimal non-opioid multimodal pain management to improve pain control.)       ascorbic acid, vitamin C, (VITAMIN C) 1000 MG tablet Take 2,000 mg by mouth daily        aspirin (ASA) 81 MG EC tablet Take 1 tablet (81 mg) by mouth daily       atorvastatin (LIPITOR) 20 MG tablet Take 1 tablet (20 mg) by mouth every evening 30 tablet 11     b complex vitamins (B COMPLEX 1) tablet Take 1 tablet by mouth daily        Charcoal Activated (ACTIVATED CHARCOAL PO) Take 2 capsules by mouth daily       cholecalciferol (VITAMIN D3) 125 mcg (5000 units) capsule Take 125 mcg by mouth daily       docusate sodium (STOOL SOFTENER) 100 MG capsule Take 100 mg by mouth daily        fish oil-omega-3 fatty acids 1000 MG capsule Take 4 g by mouth 2 times daily       hydrochlorothiazide (HYDRODIURIL) 25 MG tablet Take 1 tablet (25 mg) by mouth daily 90 tablet 1     Milk Thistle 175 MG CAPS Take 2 capsules by mouth daily       Misc Natural Products (URINOZINC PROSTATE PO) Take 1 capsule by mouth 2 times daily        QUERCETIN PO Take 1 tablet by mouth daily       saw palmetto fruit 450 mg cap Take 900 mg by mouth daily        UNABLE TO FIND Take 1 tablet by mouth 2 times daily MEDICATION NAME: Protandim       UNABLE TO FIND Vitamin: Special 2       Zinc 100 MG TABS Take 100 mg by mouth daily       doxazosin (CARDURA) 1 MG tablet Take 1 tablet (1 mg) by mouth At Bedtime 30 tablet 0       Family History   Problem Relation Age of Onset     Cerebrovascular Disease Mother      Aneurysm Mother      No Known Problems Father         reports that he has been smoking cigarettes. He started smoking about 54 years ago. He has a 79.50 pack-year smoking history. He has never used smokeless tobacco. He reports previous alcohol use. He reports that he does not use  drugs.    Review of Systems -   The 12 point review of systems  is within normal limits except for as mentioned above in the HPI.  General ROS: No complaints or constitutional symptoms  Ophthalmic ROS: No complaints of visual changes  Skin: No complaints or symptoms   Endocrine: No complaints or symptoms  Hematologic/Lymphatic: No symptoms or complaints  Psychiatric: No symptoms or complaints  Respiratory ROS: no cough, shortness of breath, or wheezing  Cardiovascular ROS: no chest pain or dyspnea on exertion  Gastrointestinal ROS: As per HPI  Genito-Urinary ROS: no dysuria, trouble voiding, or hematuria  Musculoskeletal ROS: no joint or muscle pain  Neurological ROS: no TIA or stroke symptoms      There were no vitals taken for this visit.  There is no height or weight on file to calculate BMI.    EXAM:  GENERAL: Well developed male  HEENT: Extra ocular muscles intact, pupils are round and reactive, sclera is anicteric,   NECK:  No obvious masses or deformities  ABDOMEN: Soft, broad-based midline incisional hernia approximately 8 cm in transverse dimensions with area of chronically incarcerated bowel  NEURO: No obvious defects noted.  EXT: No edema, no obvious deformities or any other abnormalities    IMAGES: CT scan images reviewed and demonstrate a 7 to 8 cm midline incisional hernia with intestinal contents    Assessment/Plan:    Jose Bailey is a 66 year old male with a chronic incisional hernia.  The pathophysiology of these hernias was discussed as were the surgical and non-operative management strategies.  He has successfully undergone his AAA repair.  At this point we can proceed with surgical intervention to fix his hernia.    The risks of surgery were discussed which include, but are not limited to, bleeding, infection, recurrent hernia, chronic pain, poor cosmesis, blood clots, stroke, heart attack and death.  Additionally, the risks of observation were discussed which include, but are not limited to,  enlargement of the hernia, incarceration, strangulation, pain and death.  Surgical options including open, laparoscopic and robotic hernia repair were discussed in detail.      He understands everything which was discussed and has consented to proceed with surgery.  We will therefore schedule robotic repair of the hernia accordingly.      Ubaldo Alba D.O. MALENA  [phone]  [facility] Department of Surgery      Again, thank you for allowing me to participate in the care of your patient.        Sincerely,        Farhan Alba, DO

## 2022-01-25 ENCOUNTER — TELEPHONE (OUTPATIENT)
Dept: SURGERY | Facility: CLINIC | Age: 67
End: 2022-01-25
Payer: COMMERCIAL

## 2022-01-25 NOTE — TELEPHONE ENCOUNTER
1/25   left:    Jose's daughter Lalita called to schedule surgery for her father.  Tried reaching out to her to let her know that we are still waiting on orders from Dr. Alba.  Once we receive these will be calling to set up surgery.

## 2022-01-26 RX ORDER — CEFAZOLIN SODIUM/WATER 2 G/20 ML
2 SYRINGE (ML) INTRAVENOUS
Status: CANCELLED | OUTPATIENT
Start: 2022-04-14

## 2022-01-26 RX ORDER — CEFAZOLIN SODIUM/WATER 2 G/20 ML
2 SYRINGE (ML) INTRAVENOUS SEE ADMIN INSTRUCTIONS
Status: CANCELLED | OUTPATIENT
Start: 2022-04-14

## 2022-01-26 NOTE — PROGRESS NOTES
HPI:  Jose Bailey is a 66 year old male who was referred to me by No Ref-Primary, Physician for a ventral hernia.  He presents with complaints of a bulge in the mid abdominal region with increasing pain with radiation to the left abdomen.  He recently underwent endovascular repair of a large abdominal aortic aneurysm with excellent results.  He continues to have the abdominal discomfort and states that the midline incisional bulge is progressively enlarging.  He does have intermittent nausea but denies any vomiting, fevers or chills.    Allergies:Patient has no known allergies.    Past Medical History:   Diagnosis Date     AAA (abdominal aortic aneurysm) (H)      Abdominal aortic aneurysm (AAA) (H) 12/1/2021     THOMPSON (acute kidney injury) (H) 10/17/2021     Benign prostatic hyperplasia with urinary obstruction 6/10/2008     CKD (chronic kidney disease) stage 3, GFR 30-59 ml/min (H) 10/17/2021     Diverticulitis      Enlarging abdominal aortic aneurysm (AAA) (H) 10/16/2021     Essential hypertension 5/19/2008     Irritable bowel syndrome 7/17/2008     Obstructive sleep apnea syndrome 8/28/2008     Pain of right upper arm 12/1/2021     SBO (small bowel obstruction) (H) 10/16/2021     Tobacco use disorder 10/17/2021       Past Surgical History:   Procedure Laterality Date     COLECTOMY       CYSTOSCOPY       ESTABLISHMENT, VASCULAR ACCESS, FEM APPROACH, FOR ENDOVASC STENT INSERTION INTO ABD AORTIC ANEURYSM N/A 12/1/2021    Procedure: Endovascular repair of abdominal aortic aneurysm with endograft using 2 docking limbs.  Large-bore access closure x2.;  Surgeon: Sheryl Fontanez MD;  Location: [facility] OR     IR ABDOMINAL AORTOGRAM  12/1/2021       CURRENT MEDS:  Current Outpatient Medications   Medication Sig Dispense Refill     acetaminophen (TYLENOL) 325 MG tablet Take 2 tablets (650 mg) by mouth every 4 hours as needed for other (For optimal non-opioid multimodal pain management to improve pain control.)        ascorbic acid, vitamin C, (VITAMIN C) 1000 MG tablet Take 2,000 mg by mouth daily        aspirin (ASA) 81 MG EC tablet Take 1 tablet (81 mg) by mouth daily       atorvastatin (LIPITOR) 20 MG tablet Take 1 tablet (20 mg) by mouth every evening 30 tablet 11     b complex vitamins (B COMPLEX 1) tablet Take 1 tablet by mouth daily        Charcoal Activated (ACTIVATED CHARCOAL PO) Take 2 capsules by mouth daily       cholecalciferol (VITAMIN D3) 125 mcg (5000 units) capsule Take 125 mcg by mouth daily       docusate sodium (STOOL SOFTENER) 100 MG capsule Take 100 mg by mouth daily        fish oil-omega-3 fatty acids 1000 MG capsule Take 4 g by mouth 2 times daily       hydrochlorothiazide (HYDRODIURIL) 25 MG tablet Take 1 tablet (25 mg) by mouth daily 90 tablet 1     Milk Thistle 175 MG CAPS Take 2 capsules by mouth daily       Misc Natural Products (URINOZINC PROSTATE PO) Take 1 capsule by mouth 2 times daily        QUERCETIN PO Take 1 tablet by mouth daily       saw palmetto fruit 450 mg cap Take 900 mg by mouth daily        UNABLE TO FIND Take 1 tablet by mouth 2 times daily MEDICATION NAME: Shanta       UNABLE TO FIND Vitamin: Special 2       Zinc 100 MG TABS Take 100 mg by mouth daily       doxazosin (CARDURA) 1 MG tablet Take 1 tablet (1 mg) by mouth At Bedtime 30 tablet 0       Family History   Problem Relation Age of Onset     Cerebrovascular Disease Mother      Aneurysm Mother      No Known Problems Father         reports that he has been smoking cigarettes. He started smoking about 54 years ago. He has a 79.50 pack-year smoking history. He has never used smokeless tobacco. He reports previous alcohol use. He reports that he does not use drugs.    Review of Systems -   The 12 point review of systems  is within normal limits except for as mentioned above in the HPI.  General ROS: No complaints or constitutional symptoms  Ophthalmic ROS: No complaints of visual changes  Skin: No complaints or symptoms    Endocrine: No complaints or symptoms  Hematologic/Lymphatic: No symptoms or complaints  Psychiatric: No symptoms or complaints  Respiratory ROS: no cough, shortness of breath, or wheezing  Cardiovascular ROS: no chest pain or dyspnea on exertion  Gastrointestinal ROS: As per HPI  Genito-Urinary ROS: no dysuria, trouble voiding, or hematuria  Musculoskeletal ROS: no joint or muscle pain  Neurological ROS: no TIA or stroke symptoms      There were no vitals taken for this visit.  There is no height or weight on file to calculate BMI.    EXAM:  GENERAL: Well developed male  HEENT: Extra ocular muscles intact, pupils are round and reactive, sclera is anicteric,   NECK:  No obvious masses or deformities  ABDOMEN: Soft, broad-based midline incisional hernia approximately 8 cm in transverse dimensions with area of chronically incarcerated bowel  NEURO: No obvious defects noted.  EXT: No edema, no obvious deformities or any other abnormalities    IMAGES: CT scan images reviewed and demonstrate a 7 to 8 cm midline incisional hernia with intestinal contents    Assessment/Plan:    Jose Bailey is a 66 year old male with a chronic incisional hernia.  The pathophysiology of these hernias was discussed as were the surgical and non-operative management strategies.  He has successfully undergone his AAA repair.  At this point we can proceed with surgical intervention to fix his hernia.    The risks of surgery were discussed which include, but are not limited to, bleeding, infection, recurrent hernia, chronic pain, poor cosmesis, blood clots, stroke, heart attack and death.  Additionally, the risks of observation were discussed which include, but are not limited to, enlargement of the hernia, incarceration, strangulation, pain and death.  Surgical options including open, laparoscopic and robotic hernia repair were discussed in detail.      He understands everything which was discussed and has consented to proceed with surgery.   We will therefore schedule robotic repair of the hernia accordingly.      Ubaldo Alba D.O. [facility]  [phone]  [facility] Department of Surgery

## 2022-01-28 ENCOUNTER — TELEPHONE (OUTPATIENT)
Dept: SURGERY | Facility: CLINIC | Age: 67
End: 2022-01-28
Payer: COMMERCIAL

## 2022-01-28 DIAGNOSIS — Z11.59 ENCOUNTER FOR SCREENING FOR OTHER VIRAL DISEASES: Primary | ICD-10-CM

## 2022-01-28 NOTE — TELEPHONE ENCOUNTER
Left message for Lalita regarding surgery scheduling for Jose.    I did get him scheduled for 2/7 with Dr. Alba at [facility]. I took this available date because the next available would have been into late April early May at this time.    I explained that there is more information (covid/prep/pre op/etc) that we need to go over and asked her to give me a call back to discuss these details. Once we have gone over the details and finalized the information we will send her a letter with the details.

## 2022-01-28 NOTE — TELEPHONE ENCOUNTER
Lalita calling to let us know that her father's surgery currently scheduled for 2/7 may need to be moved out farther.  He thought it would be in a month or two.     Option to find out what the hospital has available with Dr. Alba's schedule  And double check everything if overnight is not needed we can maybe push him out a month or so at least so he can complete pre-op and covid test w/ rushing.    She was ok with a call back when we have something and if no over night is needed then we can just r/s out at least a month for him and just adjust all the appointments.    Return call to [phone].

## 2022-02-01 NOTE — TELEPHONE ENCOUNTER
Spoke with Lalita pt's daughter  over the phone today regarding surgery  RE- scheduling   with Dr. Alba   at [facility] on  date: 4/14/2022 Estimated arrival 7:30 AM    Patient was asked/educated on the following:    (hernias) Is this part of a WC Claim? : No   IF yes, must have authorization letter before scheduling    Are you taking any blood thinners? Yes   IF yes, patient has been informed to consult their PCP/Cardiologist about stopping their blood thinners about a week before surgery.    IF yes, Patient was informed that failure to do so may result in cancellation of surgery.YES    Do you use oxygen or a sleep apnea machine at any point? No   IF yes to Oxygen, pt will need to be done at the hospital.   IF patient uses CPAP okay to be done at [facility], BiPAP needs to be done at the hospital.    IF surgery is being done at [facility], is the BMI under 50? Yes   IF no, patient will need to be done at the hospital.    Required Covid Test with in 4 days prior to surgery: Date: 4/10/22 , Location: [place] at 10 a.m.    Post Op: will be 2 weeks after surgery and booked at that time.  Pre Op Physical will need to be done within 30 days prior to surgery: Yes  OR  Surgeon will do the pre op physical the day of surgery: No        Surgery Letter sent via mail

## 2022-02-11 ENCOUNTER — TELEPHONE (OUTPATIENT)
Dept: SURGERY | Facility: CLINIC | Age: 67
End: 2022-02-11
Payer: COMMERCIAL

## 2022-02-18 ENCOUNTER — VIRTUAL VISIT (OUTPATIENT)
Dept: SURGERY | Facility: CLINIC | Age: 67
End: 2022-02-18
Payer: COMMERCIAL

## 2022-02-18 DIAGNOSIS — K43.2 INCISIONAL HERNIA, WITHOUT OBSTRUCTION OR GANGRENE: Primary | ICD-10-CM

## 2022-02-18 PROCEDURE — 99207 PR NO CHARGE LOS: CPT | Performed by: SURGERY

## 2022-02-18 NOTE — LETTER
"    2/18/2022         RE: Jose Bailey  [address]  [place] [facility] [address]        Dear Colleague,    Thank you for referring your patient, Jose Bailey, to the [facility] [place]. Please see a copy of my visit note below.      The patient has been notified of following:     \"This telephone visit will be conducted via a call between you and your physician/provider. We have found that certain health care needs can be provided without the need for a physical exam.  This service lets us provide the care you need with a short phone conversation.  If a prescription is necessary we can send it directly to your pharmacy.  If lab work is needed we can place an order for that and you can then stop by our lab to have the test done at a later time.    Telephone visits are billed at different rates depending on your insurance coverage. During this emergency period, for some insurers they may be billed the same as an in-person visit.  Please reach out to your insurance provider with any questions.    If during the course of the call the physician/provider feels a telephone visit is not appropriate, you will not be charged for this service.\"    Patient has given verbal consent to a Telephone visit? Yes    What phone number would you like to be contacted at? [phone]    Patient would like to receive their AVS by Francy    Additional provider notes: I had an extensive discussion with the patient regarding his upcoming surgery.  We did discuss bowel habits and his history of constipation in the setting of Lyme's disease.  I explained to him that fixing his ventral hernia will likely not contribute to improved bowel movements although sometimes this may happen with improved intestinal motility.  After discussion, risk benefits of surgery were reiterated and he has agreed to proceed.      Ubaldo Alba DO [facility]  [phone]    Phone call duration: 10 " minutes      Again, thank you for allowing me to participate in the care of your patient.        Sincerely,        Farhan Alba, DO

## 2022-02-18 NOTE — PROGRESS NOTES
"  The patient has been notified of following:     \"This telephone visit will be conducted via a call between you and your physician/provider. We have found that certain health care needs can be provided without the need for a physical exam.  This service lets us provide the care you need with a short phone conversation.  If a prescription is necessary we can send it directly to your pharmacy.  If lab work is needed we can place an order for that and you can then stop by our lab to have the test done at a later time.    Telephone visits are billed at different rates depending on your insurance coverage. During this emergency period, for some insurers they may be billed the same as an in-person visit.  Please reach out to your insurance provider with any questions.    If during the course of the call the physician/provider feels a telephone visit is not appropriate, you will not be charged for this service.\"    Patient has given verbal consent to a Telephone visit? Yes    What phone number would you like to be contacted at? [phone]    Patient would like to receive their AVS by [facility]    Additional provider notes: I had an extensive discussion with the patient regarding his upcoming surgery.  We did discuss bowel habits and his history of constipation in the setting of Lyme's disease.  I explained to him that fixing his ventral hernia will likely not contribute to improved bowel movements although sometimes this may happen with improved intestinal motility.  After discussion, risk benefits of surgery were reiterated and he has agreed to proceed.      Ubaldo Alba DO [facility]  [phone]    Phone call duration: 10 minutes  "

## 2022-03-30 ENCOUNTER — TELEPHONE (OUTPATIENT)
Dept: SURGERY | Facility: CLINIC | Age: 67
End: 2022-03-30
Payer: COMMERCIAL

## 2022-04-11 ENCOUNTER — LAB (OUTPATIENT)
Dept: LAB | Facility: CLINIC | Age: 67
End: 2022-04-11
Payer: COMMERCIAL

## 2022-04-11 DIAGNOSIS — Z11.59 ENCOUNTER FOR SCREENING FOR OTHER VIRAL DISEASES: ICD-10-CM

## 2022-04-11 LAB — SARS-COV-2 RNA RESP QL NAA+PROBE: NEGATIVE

## 2022-04-11 PROCEDURE — U0003 INFECTIOUS AGENT DETECTION BY NUCLEIC ACID (DNA OR RNA); SEVERE ACUTE RESPIRATORY SYNDROME CORONAVIRUS 2 (SARS-COV-2) (CORONAVIRUS DISEASE [COVID-19]), AMPLIFIED PROBE TECHNIQUE, MAKING USE OF HIGH THROUGHPUT TECHNOLOGIES AS DESCRIBED BY CMS-2020-01-R: HCPCS

## 2022-04-11 PROCEDURE — U0005 INFEC AGEN DETEC AMPLI PROBE: HCPCS

## 2022-04-13 ENCOUNTER — ANESTHESIA EVENT (OUTPATIENT)
Dept: SURGERY | Facility: HOSPITAL | Age: 67
End: 2022-04-13
Payer: COMMERCIAL

## 2022-04-14 ENCOUNTER — ANESTHESIA (OUTPATIENT)
Dept: SURGERY | Facility: HOSPITAL | Age: 67
End: 2022-04-14
Payer: COMMERCIAL

## 2022-04-14 ENCOUNTER — HOSPITAL ENCOUNTER (OUTPATIENT)
Facility: HOSPITAL | Age: 67
Discharge: HOME | End: 2022-04-15
Attending: SURGERY | Admitting: SURGERY
Payer: COMMERCIAL

## 2022-04-14 DIAGNOSIS — K43.9 ABDOMINAL WALL HERNIA: Primary | ICD-10-CM

## 2022-04-14 DIAGNOSIS — N40.1 BENIGN PROSTATIC HYPERPLASIA WITH URINARY OBSTRUCTION: ICD-10-CM

## 2022-04-14 DIAGNOSIS — I10 ESSENTIAL HYPERTENSION: ICD-10-CM

## 2022-04-14 DIAGNOSIS — N13.8 BENIGN PROSTATIC HYPERPLASIA WITH URINARY OBSTRUCTION: ICD-10-CM

## 2022-04-14 PROBLEM — I71.40 ABDOMINAL AORTIC ANEURYSM (AAA) (H): Status: ACTIVE | Noted: 2021-12-01

## 2022-04-14 PROBLEM — R00.1 BRADYCARDIA: Status: ACTIVE | Noted: 2022-04-14

## 2022-04-14 PROBLEM — N18.30 CKD (CHRONIC KIDNEY DISEASE) STAGE 3, GFR 30-59 ML/MIN (H): Status: ACTIVE | Noted: 2021-10-17

## 2022-04-14 LAB
ANION GAP SERPL CALCULATED.3IONS-SCNC: 8 MMOL/L (ref 5–18)
BUN SERPL-MCNC: 30 MG/DL (ref 8–22)
CALCIUM SERPL-MCNC: 8.6 MG/DL (ref 8.5–10.5)
CHLORIDE BLD-SCNC: 100 MMOL/L (ref 98–107)
CO2 SERPL-SCNC: 26 MMOL/L (ref 22–31)
CREAT SERPL-MCNC: 1.39 MG/DL (ref 0.7–1.3)
GFR SERPL CREATININE-BSD FRML MDRD: 55 ML/MIN/1.73M2
GLUCOSE BLD-MCNC: 146 MG/DL (ref 70–125)
MAGNESIUM SERPL-MCNC: 2.6 MG/DL (ref 1.8–2.6)
POTASSIUM BLD-SCNC: 4.9 MMOL/L (ref 3.5–5)
SODIUM SERPL-SCNC: 134 MMOL/L (ref 136–145)

## 2022-04-14 PROCEDURE — 250N000009 HC RX 250: Performed by: NURSE ANESTHETIST, CERTIFIED REGISTERED

## 2022-04-14 PROCEDURE — 999N000141 HC STATISTIC PRE-PROCEDURE NURSING ASSESSMENT: Performed by: SURGERY

## 2022-04-14 PROCEDURE — 272N000001 HC OR GENERAL SUPPLY STERILE: Performed by: SURGERY

## 2022-04-14 PROCEDURE — S2900 ROBOTIC SURGICAL SYSTEM: HCPCS | Performed by: SURGERY

## 2022-04-14 PROCEDURE — 250N000025 HC SEVOFLURANE, PER MIN: Performed by: SURGERY

## 2022-04-14 PROCEDURE — 80048 BASIC METABOLIC PNL TOTAL CA: CPT | Performed by: INTERNAL MEDICINE

## 2022-04-14 PROCEDURE — 710N000009 HC RECOVERY PHASE 1, LEVEL 1, PER MIN: Performed by: SURGERY

## 2022-04-14 PROCEDURE — 36415 COLL VENOUS BLD VENIPUNCTURE: CPT | Performed by: INTERNAL MEDICINE

## 2022-04-14 PROCEDURE — 15734 MUSCLE-SKIN GRAFT TRUNK: CPT | Mod: 59 | Performed by: SURGERY

## 2022-04-14 PROCEDURE — 258N000003 HC RX IP 258 OP 636: Performed by: ANESTHESIOLOGY

## 2022-04-14 PROCEDURE — 250N000009 HC RX 250: Performed by: INTERNAL MEDICINE

## 2022-04-14 PROCEDURE — 49654 PR LAP INCISIONAL HERNIA REPAIR: CPT | Mod: 51 | Performed by: SURGERY

## 2022-04-14 PROCEDURE — 370N000017 HC ANESTHESIA TECHNICAL FEE, PER MIN: Performed by: SURGERY

## 2022-04-14 PROCEDURE — 250N000011 HC RX IP 250 OP 636: Performed by: NURSE ANESTHETIST, CERTIFIED REGISTERED

## 2022-04-14 PROCEDURE — 83735 ASSAY OF MAGNESIUM: CPT | Performed by: INTERNAL MEDICINE

## 2022-04-14 PROCEDURE — 250N000011 HC RX IP 250 OP 636: Performed by: ANESTHESIOLOGY

## 2022-04-14 PROCEDURE — C1781 MESH (IMPLANTABLE): HCPCS | Performed by: SURGERY

## 2022-04-14 PROCEDURE — 258N000003 HC RX IP 258 OP 636: Performed by: NURSE ANESTHETIST, CERTIFIED REGISTERED

## 2022-04-14 PROCEDURE — 250N000011 HC RX IP 250 OP 636: Performed by: SURGERY

## 2022-04-14 PROCEDURE — 360N000080 HC SURGERY LEVEL 7, PER MIN: Performed by: SURGERY

## 2022-04-14 PROCEDURE — 250N000013 HC RX MED GY IP 250 OP 250 PS 637: Performed by: SURGERY

## 2022-04-14 PROCEDURE — 99213 OFFICE O/P EST LOW 20 MIN: CPT | Performed by: INTERNAL MEDICINE

## 2022-04-14 PROCEDURE — 250N000009 HC RX 250: Performed by: SURGERY

## 2022-04-14 DEVICE — MACROPOROUS MESH, MONOFILAMENT POLYPROPYLENE
Type: IMPLANTABLE DEVICE | Site: ABDOMEN | Status: FUNCTIONAL
Brand: PARIETENE

## 2022-04-14 RX ORDER — DEXAMETHASONE SODIUM PHOSPHATE 10 MG/ML
INJECTION, SOLUTION INTRAMUSCULAR; INTRAVENOUS PRN
Status: DISCONTINUED | OUTPATIENT
Start: 2022-04-14 | End: 2022-04-14

## 2022-04-14 RX ORDER — FENTANYL CITRATE 50 UG/ML
25 INJECTION, SOLUTION INTRAMUSCULAR; INTRAVENOUS EVERY 5 MIN PRN
Status: DISCONTINUED | OUTPATIENT
Start: 2022-04-14 | End: 2022-04-14

## 2022-04-14 RX ORDER — LIDOCAINE 40 MG/G
CREAM TOPICAL
Status: DISCONTINUED | OUTPATIENT
Start: 2022-04-14 | End: 2022-04-14 | Stop reason: HOSPADM

## 2022-04-14 RX ORDER — FENTANYL CITRATE 50 UG/ML
25 INJECTION, SOLUTION INTRAMUSCULAR; INTRAVENOUS
Status: DISCONTINUED | OUTPATIENT
Start: 2022-04-14 | End: 2022-04-14 | Stop reason: HOSPADM

## 2022-04-14 RX ORDER — CEFAZOLIN SODIUM/WATER 2 G/20 ML
2 SYRINGE (ML) INTRAVENOUS
Status: COMPLETED | OUTPATIENT
Start: 2022-04-14 | End: 2022-04-14

## 2022-04-14 RX ORDER — PROPOFOL 10 MG/ML
INJECTION, EMULSION INTRAVENOUS CONTINUOUS PRN
Status: DISCONTINUED | OUTPATIENT
Start: 2022-04-14 | End: 2022-04-14

## 2022-04-14 RX ORDER — HYDROCHLOROTHIAZIDE 25 MG/1
25 TABLET ORAL DAILY
Status: DISCONTINUED | OUTPATIENT
Start: 2022-04-15 | End: 2022-04-15 | Stop reason: HOSPADM

## 2022-04-14 RX ORDER — GABAPENTIN 100 MG/1
CAPSULE ORAL
Qty: 30 CAPSULE | Refills: 0 | Status: SHIPPED | OUTPATIENT
Start: 2022-04-14 | End: 2022-04-15

## 2022-04-14 RX ORDER — LIDOCAINE HYDROCHLORIDE 20 MG/ML
INJECTION, SOLUTION INFILTRATION; PERINEURAL PRN
Status: DISCONTINUED | OUTPATIENT
Start: 2022-04-14 | End: 2022-04-14

## 2022-04-14 RX ORDER — OXYCODONE HYDROCHLORIDE 5 MG/1
5 TABLET ORAL EVERY 4 HOURS PRN
Status: DISCONTINUED | OUTPATIENT
Start: 2022-04-14 | End: 2022-04-15 | Stop reason: HOSPADM

## 2022-04-14 RX ORDER — NALOXONE HYDROCHLORIDE 0.4 MG/ML
0.2 INJECTION, SOLUTION INTRAMUSCULAR; INTRAVENOUS; SUBCUTANEOUS
Status: DISCONTINUED | OUTPATIENT
Start: 2022-04-14 | End: 2022-04-15 | Stop reason: HOSPADM

## 2022-04-14 RX ORDER — LIDOCAINE 40 MG/G
CREAM TOPICAL
Status: DISCONTINUED | OUTPATIENT
Start: 2022-04-14 | End: 2022-04-15 | Stop reason: HOSPADM

## 2022-04-14 RX ORDER — OXYCODONE HYDROCHLORIDE 5 MG/1
5 TABLET ORAL EVERY 4 HOURS PRN
Status: DISCONTINUED | OUTPATIENT
Start: 2022-04-14 | End: 2022-04-14

## 2022-04-14 RX ORDER — DOCUSATE SODIUM 100 MG/1
100 CAPSULE, LIQUID FILLED ORAL DAILY
Status: DISCONTINUED | OUTPATIENT
Start: 2022-04-14 | End: 2022-04-15 | Stop reason: HOSPADM

## 2022-04-14 RX ORDER — CEFAZOLIN SODIUM/WATER 2 G/20 ML
2 SYRINGE (ML) INTRAVENOUS SEE ADMIN INSTRUCTIONS
Status: DISCONTINUED | OUTPATIENT
Start: 2022-04-14 | End: 2022-04-14 | Stop reason: HOSPADM

## 2022-04-14 RX ORDER — ONDANSETRON 4 MG/1
4 TABLET, ORALLY DISINTEGRATING ORAL EVERY 6 HOURS PRN
Status: DISCONTINUED | OUTPATIENT
Start: 2022-04-14 | End: 2022-04-15 | Stop reason: HOSPADM

## 2022-04-14 RX ORDER — FENTANYL CITRATE 50 UG/ML
50 INJECTION, SOLUTION INTRAMUSCULAR; INTRAVENOUS
Status: DISCONTINUED | OUTPATIENT
Start: 2022-04-14 | End: 2022-04-14 | Stop reason: HOSPADM

## 2022-04-14 RX ORDER — GLYCOPYRROLATE 0.2 MG/ML
INJECTION, SOLUTION INTRAMUSCULAR; INTRAVENOUS PRN
Status: DISCONTINUED | OUTPATIENT
Start: 2022-04-14 | End: 2022-04-14

## 2022-04-14 RX ORDER — SODIUM CHLORIDE, SODIUM LACTATE, POTASSIUM CHLORIDE, CALCIUM CHLORIDE 600; 310; 30; 20 MG/100ML; MG/100ML; MG/100ML; MG/100ML
INJECTION, SOLUTION INTRAVENOUS CONTINUOUS
Status: DISCONTINUED | OUTPATIENT
Start: 2022-04-14 | End: 2022-04-14 | Stop reason: HOSPADM

## 2022-04-14 RX ORDER — ONDANSETRON 2 MG/ML
INJECTION INTRAMUSCULAR; INTRAVENOUS PRN
Status: DISCONTINUED | OUTPATIENT
Start: 2022-04-14 | End: 2022-04-14

## 2022-04-14 RX ORDER — LANOLIN ALCOHOL/MO/W.PET/CERES
3 CREAM (GRAM) TOPICAL
Status: DISCONTINUED | OUTPATIENT
Start: 2022-04-14 | End: 2022-04-15 | Stop reason: HOSPADM

## 2022-04-14 RX ORDER — NALOXONE HYDROCHLORIDE 0.4 MG/ML
0.4 INJECTION, SOLUTION INTRAMUSCULAR; INTRAVENOUS; SUBCUTANEOUS
Status: DISCONTINUED | OUTPATIENT
Start: 2022-04-14 | End: 2022-04-15 | Stop reason: HOSPADM

## 2022-04-14 RX ORDER — LIDOCAINE HYDROCHLORIDE 20 MG/ML
JELLY TOPICAL ONCE
Status: COMPLETED | OUTPATIENT
Start: 2022-04-14 | End: 2022-04-14

## 2022-04-14 RX ORDER — ONDANSETRON 4 MG/1
4 TABLET, ORALLY DISINTEGRATING ORAL EVERY 30 MIN PRN
Status: DISCONTINUED | OUTPATIENT
Start: 2022-04-14 | End: 2022-04-14

## 2022-04-14 RX ORDER — BUPIVACAINE HYDROCHLORIDE 2.5 MG/ML
INJECTION, SOLUTION EPIDURAL; INFILTRATION; INTRACAUDAL
Status: COMPLETED | OUTPATIENT
Start: 2022-04-14 | End: 2022-04-14

## 2022-04-14 RX ORDER — MULTIVIT WITH MINERALS/LUTEIN
2000 TABLET ORAL DAILY
Status: DISCONTINUED | OUTPATIENT
Start: 2022-04-15 | End: 2022-04-15 | Stop reason: HOSPADM

## 2022-04-14 RX ORDER — DOXAZOSIN 1 MG/1
1 TABLET ORAL AT BEDTIME
Status: DISCONTINUED | OUTPATIENT
Start: 2022-04-14 | End: 2022-04-15

## 2022-04-14 RX ORDER — OXYCODONE HYDROCHLORIDE 5 MG/1
5-10 TABLET ORAL
Qty: 30 TABLET | Refills: 0 | Status: SHIPPED | OUTPATIENT
Start: 2022-04-14 | End: 2022-04-15

## 2022-04-14 RX ORDER — EPHEDRINE SULFATE 50 MG/ML
INJECTION, SOLUTION INTRAMUSCULAR; INTRAVENOUS; SUBCUTANEOUS PRN
Status: DISCONTINUED | OUTPATIENT
Start: 2022-04-14 | End: 2022-04-14

## 2022-04-14 RX ORDER — HYDROMORPHONE HCL IN WATER/PF 6 MG/30 ML
0.4 PATIENT CONTROLLED ANALGESIA SYRINGE INTRAVENOUS
Status: DISCONTINUED | OUTPATIENT
Start: 2022-04-14 | End: 2022-04-15

## 2022-04-14 RX ORDER — FENTANYL CITRATE 50 UG/ML
INJECTION, SOLUTION INTRAMUSCULAR; INTRAVENOUS PRN
Status: DISCONTINUED | OUTPATIENT
Start: 2022-04-14 | End: 2022-04-14

## 2022-04-14 RX ORDER — SODIUM CHLORIDE, SODIUM LACTATE, POTASSIUM CHLORIDE, CALCIUM CHLORIDE 600; 310; 30; 20 MG/100ML; MG/100ML; MG/100ML; MG/100ML
INJECTION, SOLUTION INTRAVENOUS CONTINUOUS
Status: DISCONTINUED | OUTPATIENT
Start: 2022-04-14 | End: 2022-04-14

## 2022-04-14 RX ORDER — PROPOFOL 10 MG/ML
INJECTION, EMULSION INTRAVENOUS PRN
Status: DISCONTINUED | OUTPATIENT
Start: 2022-04-14 | End: 2022-04-14

## 2022-04-14 RX ORDER — HYDRALAZINE HYDROCHLORIDE 20 MG/ML
10 INJECTION INTRAMUSCULAR; INTRAVENOUS EVERY 4 HOURS PRN
Status: DISCONTINUED | OUTPATIENT
Start: 2022-04-14 | End: 2022-04-15 | Stop reason: HOSPADM

## 2022-04-14 RX ORDER — ONDANSETRON 2 MG/ML
4 INJECTION INTRAMUSCULAR; INTRAVENOUS EVERY 6 HOURS PRN
Status: DISCONTINUED | OUTPATIENT
Start: 2022-04-14 | End: 2022-04-15 | Stop reason: HOSPADM

## 2022-04-14 RX ORDER — ATENOLOL 100 MG/1
100 TABLET ORAL DAILY
Status: ON HOLD | COMMUNITY
Start: 2022-04-06 | End: 2022-04-15

## 2022-04-14 RX ORDER — ONDANSETRON 2 MG/ML
4 INJECTION INTRAMUSCULAR; INTRAVENOUS EVERY 30 MIN PRN
Status: DISCONTINUED | OUTPATIENT
Start: 2022-04-14 | End: 2022-04-14

## 2022-04-14 RX ORDER — DOXAZOSIN 1 MG/1
1 TABLET ORAL AT BEDTIME
Status: ON HOLD | COMMUNITY
End: 2022-04-15

## 2022-04-14 RX ORDER — ACETAMINOPHEN 325 MG/1
650 TABLET ORAL EVERY 4 HOURS PRN
Qty: 100 TABLET | Refills: 0 | Status: SHIPPED | OUTPATIENT
Start: 2022-04-14 | End: 2022-07-07

## 2022-04-14 RX ORDER — OXYCODONE HYDROCHLORIDE 5 MG/1
10 TABLET ORAL EVERY 4 HOURS PRN
Status: DISCONTINUED | OUTPATIENT
Start: 2022-04-14 | End: 2022-04-15 | Stop reason: HOSPADM

## 2022-04-14 RX ORDER — ATORVASTATIN CALCIUM 10 MG/1
20 TABLET, FILM COATED ORAL EVERY EVENING
Status: DISCONTINUED | OUTPATIENT
Start: 2022-04-14 | End: 2022-04-14

## 2022-04-14 RX ORDER — ASPIRIN 81 MG/1
81 TABLET ORAL DAILY
Status: DISCONTINUED | OUTPATIENT
Start: 2022-04-15 | End: 2022-04-15 | Stop reason: HOSPADM

## 2022-04-14 RX ORDER — MAGNESIUM SULFATE 4 G/50ML
4 INJECTION INTRAVENOUS ONCE
Status: COMPLETED | OUTPATIENT
Start: 2022-04-14 | End: 2022-04-14

## 2022-04-14 RX ORDER — HYDROMORPHONE HCL IN WATER/PF 6 MG/30 ML
0.2 PATIENT CONTROLLED ANALGESIA SYRINGE INTRAVENOUS
Status: DISCONTINUED | OUTPATIENT
Start: 2022-04-14 | End: 2022-04-15

## 2022-04-14 RX ADMIN — MIDAZOLAM 2 MG: 1 INJECTION INTRAMUSCULAR; INTRAVENOUS at 07:27

## 2022-04-14 RX ADMIN — PHENYLEPHRINE HYDROCHLORIDE 100 MCG: 10 INJECTION INTRAVENOUS at 09:50

## 2022-04-14 RX ADMIN — PROPOFOL 25 MCG/KG/MIN: 10 INJECTION, EMULSION INTRAVENOUS at 08:05

## 2022-04-14 RX ADMIN — LIDOCAINE HYDROCHLORIDE: 20 JELLY TOPICAL at 21:07

## 2022-04-14 RX ADMIN — PHENYLEPHRINE HYDROCHLORIDE 0.4 MCG/KG/MIN: 10 INJECTION INTRAVENOUS at 10:01

## 2022-04-14 RX ADMIN — PHENYLEPHRINE HYDROCHLORIDE 100 MCG: 10 INJECTION INTRAVENOUS at 10:03

## 2022-04-14 RX ADMIN — OXYCODONE HYDROCHLORIDE 5 MG: 5 TABLET ORAL at 21:39

## 2022-04-14 RX ADMIN — DOCUSATE SODIUM 100 MG: 100 CAPSULE, LIQUID FILLED ORAL at 16:55

## 2022-04-14 RX ADMIN — HYDROMORPHONE HYDROCHLORIDE 0.5 MG: 1 INJECTION, SOLUTION INTRAMUSCULAR; INTRAVENOUS; SUBCUTANEOUS at 11:48

## 2022-04-14 RX ADMIN — ROCURONIUM BROMIDE 20 MG: 50 INJECTION, SOLUTION INTRAVENOUS at 08:42

## 2022-04-14 RX ADMIN — ROCURONIUM BROMIDE 50 MG: 50 INJECTION, SOLUTION INTRAVENOUS at 07:41

## 2022-04-14 RX ADMIN — Medication 2 G: at 07:55

## 2022-04-14 RX ADMIN — ONDANSETRON 4 MG: 2 INJECTION INTRAMUSCULAR; INTRAVENOUS at 11:27

## 2022-04-14 RX ADMIN — DEXAMETHASONE SODIUM PHOSPHATE 10 MG: 10 INJECTION, SOLUTION INTRAMUSCULAR; INTRAVENOUS at 07:41

## 2022-04-14 RX ADMIN — SODIUM CHLORIDE 10 MG/HR: 9 INJECTION, SOLUTION INTRAVENOUS at 08:41

## 2022-04-14 RX ADMIN — SODIUM CHLORIDE, POTASSIUM CHLORIDE, SODIUM LACTATE AND CALCIUM CHLORIDE: 600; 310; 30; 20 INJECTION, SOLUTION INTRAVENOUS at 08:22

## 2022-04-14 RX ADMIN — ROCURONIUM BROMIDE 10 MG: 50 INJECTION, SOLUTION INTRAVENOUS at 11:21

## 2022-04-14 RX ADMIN — FENTANYL CITRATE 100 MCG: 50 INJECTION, SOLUTION INTRAMUSCULAR; INTRAVENOUS at 08:13

## 2022-04-14 RX ADMIN — SODIUM CHLORIDE, POTASSIUM CHLORIDE, SODIUM LACTATE AND CALCIUM CHLORIDE: 600; 310; 30; 20 INJECTION, SOLUTION INTRAVENOUS at 06:40

## 2022-04-14 RX ADMIN — Medication 5 MG: at 08:10

## 2022-04-14 RX ADMIN — ROCURONIUM BROMIDE 30 MG: 50 INJECTION, SOLUTION INTRAVENOUS at 09:24

## 2022-04-14 RX ADMIN — PHENYLEPHRINE HYDROCHLORIDE 50 MCG: 10 INJECTION INTRAVENOUS at 08:49

## 2022-04-14 RX ADMIN — PHENYLEPHRINE HYDROCHLORIDE 100 MCG: 10 INJECTION INTRAVENOUS at 09:16

## 2022-04-14 RX ADMIN — PROPOFOL 170 MG: 10 INJECTION, EMULSION INTRAVENOUS at 07:41

## 2022-04-14 RX ADMIN — FENTANYL CITRATE 50 MCG: 50 INJECTION, SOLUTION INTRAMUSCULAR; INTRAVENOUS at 10:15

## 2022-04-14 RX ADMIN — HYDROMORPHONE HYDROCHLORIDE 0.2 MG: 0.2 INJECTION, SOLUTION INTRAMUSCULAR; INTRAVENOUS; SUBCUTANEOUS at 18:34

## 2022-04-14 RX ADMIN — Medication 10 MG: at 08:36

## 2022-04-14 RX ADMIN — MAGNESIUM SULFATE HEPTAHYDRATE 4 G: 80 INJECTION, SOLUTION INTRAVENOUS at 06:50

## 2022-04-14 RX ADMIN — Medication 10 MG: at 08:25

## 2022-04-14 RX ADMIN — BUPIVACAINE HYDROCHLORIDE 40 ML: 2.5 INJECTION, SOLUTION EPIDURAL; INFILTRATION; INTRACAUDAL at 07:47

## 2022-04-14 RX ADMIN — GLYCOPYRROLATE 0.2 MG: 0.2 INJECTION, SOLUTION INTRAMUSCULAR; INTRAVENOUS at 08:26

## 2022-04-14 RX ADMIN — ROCURONIUM BROMIDE 10 MG: 50 INJECTION, SOLUTION INTRAVENOUS at 10:56

## 2022-04-14 RX ADMIN — DOXAZOSIN 1 MG: 1 TABLET ORAL at 21:32

## 2022-04-14 RX ADMIN — PHENYLEPHRINE HYDROCHLORIDE 100 MCG: 10 INJECTION INTRAVENOUS at 09:36

## 2022-04-14 RX ADMIN — GLYCOPYRROLATE 0.2 MG: 0.2 INJECTION, SOLUTION INTRAMUSCULAR; INTRAVENOUS at 08:36

## 2022-04-14 RX ADMIN — FENTANYL CITRATE 50 MCG: 50 INJECTION, SOLUTION INTRAMUSCULAR; INTRAVENOUS at 07:41

## 2022-04-14 RX ADMIN — PHENYLEPHRINE HYDROCHLORIDE 100 MCG: 10 INJECTION INTRAVENOUS at 10:34

## 2022-04-14 RX ADMIN — HYDROMORPHONE HYDROCHLORIDE 0.2 MG: 0.2 INJECTION, SOLUTION INTRAMUSCULAR; INTRAVENOUS; SUBCUTANEOUS at 16:51

## 2022-04-14 RX ADMIN — SUGAMMADEX 400 MG: 100 INJECTION, SOLUTION INTRAVENOUS at 11:34

## 2022-04-14 RX ADMIN — HYDROMORPHONE HYDROCHLORIDE 0.5 MG: 1 INJECTION, SOLUTION INTRAMUSCULAR; INTRAVENOUS; SUBCUTANEOUS at 11:27

## 2022-04-14 RX ADMIN — LIDOCAINE HYDROCHLORIDE 60 MG: 20 INJECTION, SOLUTION INFILTRATION; PERINEURAL at 07:41

## 2022-04-14 RX ADMIN — SODIUM CHLORIDE, POTASSIUM CHLORIDE, SODIUM LACTATE AND CALCIUM CHLORIDE: 600; 310; 30; 20 INJECTION, SOLUTION INTRAVENOUS at 11:44

## 2022-04-14 RX ADMIN — PHENYLEPHRINE HYDROCHLORIDE 150 MCG: 10 INJECTION INTRAVENOUS at 09:46

## 2022-04-14 ASSESSMENT — COPD QUESTIONNAIRES
COPD: 1
CAT_SEVERITY: MILD

## 2022-04-14 ASSESSMENT — LIFESTYLE VARIABLES: TOBACCO_USE: 1

## 2022-04-14 NOTE — INTERVAL H&P NOTE
I have reviewed the surgical (or preoperative) H&P that is linked to this encounter, and examined the patient. There are no significant changes    Plan for Procedure(s):  HERNIORRHAPHY, INCISIONAL, ROBOT-ASSISTED, LAPAROSCOPIC, USING DA RAJEEV XI possible bilateral transversus abdominus release     Ubaldo Alba [facility]  [phone]

## 2022-04-14 NOTE — ANESTHESIA PROCEDURE NOTES
Airway       Patient location during procedure: OR       Procedure Start/Stop Times: 4/14/2022 7:45 AM  Staff -        Anesthesiologist:  Almas Loo MD       CRNA: Libertad Pelaez APRN CRNA       Other Anesthesia Staff: Merced Cobian       Performed By: CRNAIndications and Patient Condition       Indications for airway management: prashant-procedural       Induction type:intravenous       Mask difficulty assessment: 2 - vent by mask + OA or adjuvant +/- NMBA    Final Airway Details       Final airway type: endotracheal airway       Successful airway: ETT - single  Endotracheal Airway Details        ETT size (mm): 8.0       Cuffed: yes       Successful intubation technique: direct laryngoscopy       DL Blade Type: Alfred 2       Grade View of Cords: 1       Adjucts: stylet       Position: Right       Measured from: gums/teeth       Secured at (cm): 23    Post intubation assessment        Placement verified by: capnometry, equal breath sounds and chest rise        Number of attempts at approach: 1       Number of other approaches attempted: 0       Secured with: silk tape       Ease of procedure: easy       Dentition: Intact and Unchanged    Medication(s) Administered   Medication Administration Time: 4/14/2022 7:45 AM    Additional Comments       SRNA attempt x1 with Mac3 and poor view

## 2022-04-14 NOTE — ANESTHESIA PREPROCEDURE EVALUATION
Anesthesia Pre-Procedure Evaluation    Patient: Jose Bailey   MRN: [id number] : 1955        Procedure : Procedure(s):  HERNIORRHAPHY, INCISIONAL, ROBOT-ASSISTED, LAPAROSCOPIC, USING DA RAJEEV XI possible bilateral transversus abdominus release          Past Medical History:   Diagnosis Date     AAA (abdominal aortic aneurysm) (H)      Abdominal aortic aneurysm (AAA) (H) 2021     THOMPSON (acute kidney injury) (H) 10/17/2021     Benign prostatic hyperplasia with urinary obstruction 6/10/2008     CKD (chronic kidney disease) stage 3, GFR 30-59 ml/min (H) 10/17/2021     Diverticulitis      Enlarging abdominal aortic aneurysm (AAA) (H) 10/16/2021     Essential hypertension 2008     Irritable bowel syndrome 2008     Obstructive sleep apnea syndrome 2008     Pain of right upper arm 2021     SBO (small bowel obstruction) (H) 10/16/2021     Tobacco use disorder 10/17/2021      Past Surgical History:   Procedure Laterality Date     COLECTOMY       CYSTOSCOPY       ESTABLISHMENT, VASCULAR ACCESS, FEM APPROACH, FOR ENDOVASC STENT INSERTION INTO ABD AORTIC ANEURYSM N/A 2021    Procedure: Endovascular repair of abdominal aortic aneurysm with endograft using 2 docking limbs.  Large-bore access closure x2.;  Surgeon: Sheryl Fontanez MD;  Location: [facility] OR      ABDOMINAL AORTOGRAM  2021      No Known Allergies   Social History     Tobacco Use     Smoking status: Current Every Day Smoker     Packs/day: 1.50     Years: 53.00     Pack years: 79.50     Types: Cigarettes     Start date:      Smokeless tobacco: Never Used     Tobacco comment: seen by TTS 10/18/2021   Substance Use Topics     Alcohol use: Not Currently      Wt Readings from Last 1 Encounters:   22 107.9 kg (237 lb 12.8 oz)        Anesthesia Evaluation   Pt has had prior anesthetic.         ROS/MED HX  ENT/Pulmonary:     (+) sleep apnea, doesn't use CPAP, tobacco use, Current use, mild,  COPD,     Neurologic:  - neg  neurologic ROS     Cardiovascular: Comment: EKG 10/16/21:  Sinus rhythm   Normal ECG   No previous ECGs available   Confirmed by CHASE ADAME MD LOC:[facility] ([id number]) on 10/17/2021 4:34:20 PM     Echo 10/20/21:  Study Result      The nuclear stress test is negative for inducible myocardial ischemia or infarction.    Left ventricular function is mildly reduced.    The left ventricular ejection fraction at stress is 45%.    The patient is at a low risk of future cardiac ischemic events.    There is no prior study for comparison    Previous AAA repair    (+) hypertension----- (-) murmur and wheezes   METS/Exercise Tolerance: >4 METS    Hematologic:  - neg hematologic  ROS     Musculoskeletal:  - neg musculoskeletal ROS     GI/Hepatic: Comment: Abdominal hernia      Renal/Genitourinary:     (+) renal disease, type: CRI,     Endo:     (+) Obesity,     Psychiatric/Substance Use:  - neg psychiatric ROS     Infectious Disease: Comment: Hx of lyme disease      Malignancy:  - neg malignancy ROS     Other:  - neg other ROS          Physical Exam    Airway  airway exam normal      Mallampati: III   TM distance: > 3 FB   Neck ROM: full   Mouth opening: > 3 cm    Respiratory Devices and Support         Dental     Comment: Edentulous        Cardiovascular          Rhythm and rate: regular and bradycardia (-) no murmur    Pulmonary           breath sounds clear to auscultation   (-) no wheezes        OUTSIDE LABS:  CBC:   Lab Results   Component Value Date    WBC 10.5 12/02/2021    WBC 6.5 12/01/2021    HGB 11.9 (L) 12/02/2021    HGB 14.1 12/01/2021    HCT 36.8 (L) 12/02/2021    HCT 42.6 12/01/2021     12/02/2021     12/01/2021     BMP:   Lab Results   Component Value Date     12/02/2021     10/18/2021    POTASSIUM 4.1 12/02/2021    POTASSIUM 4.5 12/01/2021    CHLORIDE 101 12/02/2021    CHLORIDE 112 (H) 10/18/2021    CO2 27 12/02/2021    CO2 26 10/18/2021    BUN 22 12/02/2021    BUN 25 (H) 10/18/2021    CR 1.48  (H) 12/02/2021    CR 1.63 (H) 12/01/2021    GLC 99 12/02/2021     (H) 12/02/2021     COAGS:   Lab Results   Component Value Date    PTT 40 (H) 12/01/2021    INR 0.97 12/01/2021     POC: No results found for: BGM, HCG, HCGS  HEPATIC:   Lab Results   Component Value Date    ALBUMIN 3.3 (L) 10/16/2021    PROTTOTAL 7.0 10/16/2021    ALT 16 10/16/2021    AST 18 10/16/2021    ALKPHOS 75 10/16/2021    BILITOTAL 1.3 (H) 10/16/2021     OTHER:   Lab Results   Component Value Date    GRIS 9.2 12/02/2021    MAG 2.1 10/18/2021       Anesthesia Plan    ASA Status:  3   NPO Status:  NPO Appropriate    Anesthesia Type: General.     - Airway: ETT   Induction: Intravenous, Propofol.   Maintenance: Inhalation.        Consents    Anesthesia Plan(s) and associated risks, benefits, and realistic alternatives discussed. Questions answered and patient/representative(s) expressed understanding.     - Discussed: Risks, Benefits and Alternatives for BOTH SEDATION and the PROCEDURE were discussed     - Discussed with:  Patient      - Patient is DNR/DNI Status: No         Postoperative Care    Pain management: IV analgesics, Oral pain medications, Peripheral nerve block (Single Shot).   PONV prophylaxis: Ondansetron (or other 5HT-3), Dexamethasone or Solumedrol     Comments:    Other Comments: Risk and benefits of TAP blocks discussed with patient. All patient questions answered and patient agreeable to receiving blocks in OR for postoperative pain.            Almas Loo MD

## 2022-04-14 NOTE — ANESTHESIA CARE TRANSFER NOTE
Patient: Jose Bailey    Procedure: Procedure(s):  HERNIORRHAPHY, INCISIONAL, ROBOT-ASSISTED, LAPAROSCOPIC, USING DA RAJEEV XI BILATERAL TRANSVERSUS ABDOMINUS RELEASE; LYSIS OF ADHESIONS       Diagnosis: Incisional hernia, without obstruction or gangrene [K43.2]  Diagnosis Additional Information: No value filed.    Anesthesia Type:   General     Note:    Oropharynx: oropharynx clear of all foreign objects and spontaneously breathing  Level of Consciousness: awake  Oxygen Supplementation: face mask  Level of Supplemental Oxygen (L/min / FiO2): 6  Independent Airway: airway patency satisfactory and stable  Dentition: dentition unchanged  Vital Signs Stable: post-procedure vital signs reviewed and stable  Report to RN Given: handoff report given  Patient transferred to: PACU  Comments: Pt transferred to PACU. Extubated in OR and spontaneously breathing with sufficient gas exchange. On 6L O2 via FM. No airway. VSS. Normothermic. Report to RN at bedside.   PHILIP Watt CRNA     Handoff Report: Identifed the Patient, Identified the Reponsible Provider, Reviewed the pertinent medical history, Discussed the surgical course, Reviewed Intra-OP anesthesia mangement and issues during anesthesia, Set expectations for post-procedure period and Allowed opportunity for questions and acknowledgement of understanding      Vitals:  Vitals Value Taken Time   /81 04/14/22 1155   Temp 36.9  C (98.4  F) 04/14/22 1155   Pulse 63 04/14/22 1156   Resp 16 04/14/22 1156   SpO2 100 % 04/14/22 1156   Vitals shown include unvalidated device data.    Electronically Signed By: PHILIP Baptiste CRNA  April 14, 2022  11:57 AM

## 2022-04-14 NOTE — OP NOTE
Name:  Jose Bailey  PCP:  No Ref-Primary, Physician  Procedure Date:  4/14/2022      Procedure(s):  HERNIORRHAPHY, INCISIONAL, ROBOT-ASSISTED, LAPAROSCOPIC, USING DA RAJEEV XI possible bilateral transversus abdominus release  #1 Myocutaneous flap x 2  #2 Laparoscopic lysis of adhesions  #3 Robotic lysis of adhesions  #4 incisional hernia repair  #5 placement of mesh          Pre-Procedure Diagnosis:  Incisional hernia, without obstruction or gangrene [K43.2]     Post-Procedure Diagnosis:    Incisional hernia    Surgeon(s):  Farhan Alba DO    Circulator: Kinga De La Fuente RN; Meagan Pollard RN  Relief Scrub: Carmen Toth  Scrub Person: Bc Pradhan    Anesthesia Type:  GET      Findings:  8 x 12 cm abdominal wall incisional hernia  Significant intra-abdominal adhesions    Operative Report:      The Patient was taken the operating placed in supine position.  Endotracheal intubation was performed and antibiotics were given prior to skin incision.  A tap block was performed by the anesthesia team.  A Hernadez catheter was placed.  The abdomen was prepped and draped in sterile fashion.   The patient was then flexed and the patient was rotated to the right tilt.  I first established the pneumoperitoneum by make an 8 mm left upper quadrant incision and a Veress needle technique.  Once this was complete I then placed an 8 mm trocar into the abdomen.  The surrounding structures were scrutinized for any injuries upon entry I did not find any.  I placed 2 additional left mid abdominal wall 8 mm trochars and then upsized the left upper quadrant trocar for a 12 mm trocar.  The left lower quadrant trocar was an air seal trocar.  I proceeded to take down adhesions using LigaSure sealing device in order to gain better access to the intra-abdominal compartment.  Once this was clear the robot was then docked and I turned my attention to the console.  I proceeded to take down the adhesions.  These were fairly  significant involving small and large intestine.  The hernia defect was eventually cleaned off from all adhesions..  I then entered the retrorectus space and took down the transversalis fascia laterally along the right abdominal wall.  Large  vessels were preserved.  Once I established access to the transversus abdominis musculature on the right I then transected this with electrocautery in standard fashion.  All peritoneal rents were oversewn with 2-0 Vicryl suture.  Once this portion of the procedure was complete I then used a ruler to measure the abdominal wall defect.  This measured to be approximately 8 x 12 cm.  I then placed a 30 x 20 cm macro porous polypropylene soft mesh wall at the site of previous dissection with 0 Ethibond sutures in an interrupted fashion.  Once this was complete I then undocked the robot and scrubbed back into the case.  I placed 3 additional right abdominal wall 8 mm trochars.   I then re-docked the robot once the patient was tilted to the left and proceeded to take down the transversalis fascia on the left lateral abdominal wall in the standard fashion.  I entered the retrorectus space and completed this dissection down to the transversus abdominis musculature.  Once this was located it was transected in the standard fashion with electrocautery.  All peritoneal rents were oversewn with 2-0 Vicryl sutures to close the defect.    Once this portion of the dissection was complete I then turned my attention to the abdominal wall defect.  I reapproximated the rectus muscles in the midline with 0V lock permanent suture x2.  Once this portion was complete I then turned my attention to the transversalis fascia.  I reapproximated this with additional 2-0 absorbable V lock suture in a running fashion.  Prior to complete closure of the transversalis fascia  I remove the left upper quadrant trocar and sutured the fascial defect with 0 Ethibond suture.  I then unfurled the previously  placed mesh from the left abdominal wall to the right abdominal wall.  Once the mesh was in place the excess was trimmed with robotic scissors.  I then tacked the mesh to the lateral abdominal wall on the left with additional 0 Ethibond sutures.  At this point the hernia closure and mesh placement were complete I then undocked the robot and desufflated the abdomen.  All skin edges were then reapproximated with 4-0 Monocryl suture.  A pressure dressing was placed in the mid abdominal region to decrease chances of seroma postoperatively.  An abdominal binder was placed the patient was extubated and Hernadez catheter was removed.  All lap counts and needle counts were correct at the end of the procedure.  Estimated Blood Loss:   25cc    Specimens:    * No specimens in log *       Drains:   NG/OG/NJ Tube Orogastric Center mouth (Active)       Urethral Catheter 04/14/22 Latex 16 fr (Active)       Complications:    None    Farhan Alba DO

## 2022-04-14 NOTE — ANESTHESIA PROCEDURE NOTES
TAP Procedure Note    Pre-Procedure   Staff -        Anesthesiologist:  Almas Loo MD       Performed By: anesthesiologist       Location: OR       Procedure Start/Stop Times: 4/14/2022 7:47 AM and 4/14/2022 7:53 AM       Pre-Anesthestic Checklist: patient identified, IV checked, site marked, risks and benefits discussed, informed consent, monitors and equipment checked, pre-op evaluation, at physician/surgeon's request and post-op pain management  Timeout:       Correct Patient: Yes        Correct Procedure: Yes        Correct Site: Yes        Correct Position: Yes        Correct Laterality: Yes        Site Marked: Yes  Procedure Documentation  Procedure: TAP       Diagnosis: POST OP PAIN CONTROL       Laterality: bilateral       Patient Position: supine       Patient Prep/Sterile Barriers: sterile gloves, mask       Skin prep: Chloraprep       Needle Type: short bevel       Needle Gauge: 20.        Needle Length (Inches): 4        Ultrasound guided       1. Ultrasound was used to identify targeted nerve, plexus, vascular marker, or fascial plane and place a needle adjacent to it in real-time.       2. Ultrasound was used to visualize the spread of anesthetic in close proximity to the above referenced structure.       3. A permanent image is entered into the patient's record.       4. The visualized anatomic structures appeared normal.       5. There were no apparent abnormal pathologic findings.    Assessment/Narrative         The placement was negative for: blood aspirated, painful injection and site bleeding       Paresthesias: No.       Bolus given via needle. no blood aspirated via catheter.        Secured via.        Insertion/Infusion Method: Single Shot       Complications: none       Injection made incrementally with aspirations every 5 mL.    Medication(s) Administered   Bupivacaine 0.25% PF (Infiltration) - Infiltration   40 mL - 4/14/2022 7:47:00 AM  Medication Administration Time: 4/14/2022 7:47  AM

## 2022-04-14 NOTE — PHARMACY-ADMISSION MEDICATION HISTORY
Pharmacy Note - Admission Medication History    Pertinent Provider Information: Pt reports he threw his Lipitor away and doesn't want to take it. Gabapentin and oxy listed below are pulling from information on planned discharge med list.     ______________________________________________________________________    Prior To Admission (PTA) med list completed and updated in EMR.       PTA Med List   Medication Sig Last Dose     acetaminophen (TYLENOL) 325 MG tablet Take 2 tablets (650 mg) by mouth every 4 hours as needed for other (mild pain)      acetaminophen (TYLENOL) 325 MG tablet Take 2 tablets (650 mg) by mouth every 4 hours as needed for other (For optimal non-opioid multimodal pain management to improve pain control.) Unknown at Unknown time     ascorbic acid, vitamin C, (VITAMIN C) 1000 MG tablet Take 2,000 mg by mouth daily  Unknown at Unknown time     aspirin (ASA) 81 MG EC tablet Take 1 tablet (81 mg) by mouth daily Past Week at Unknown time     atenolol (TENORMIN) 100 MG tablet Take 100 mg by mouth daily 4/13/2022 at Unknown time     b complex vitamins (B COMPLEX 1) tablet Take 1 tablet by mouth daily  Past Week at Unknown time     Charcoal Activated (ACTIVATED CHARCOAL PO) Take 2 capsules by mouth daily Past Week at Unknown time     cholecalciferol (VITAMIN D3) 125 mcg (5000 units) capsule Take 125 mcg by mouth daily Past Week at Unknown time     docusate sodium (STOOL SOFTENER) 100 MG capsule Take 100 mg by mouth daily  4/13/2022 at Unknown time     doxazosin (CARDURA) 1 MG tablet Take 1 mg by mouth At Bedtime 4/13/2022 at Unknown time     fish oil-omega-3 fatty acids 1000 MG capsule Take 4 g by mouth 2 times daily Past Week at Unknown time     gabapentin (NEURONTIN) 100 MG capsule Take one capsule by mouth at bedtime first night, then increase to two capsules by mouth at bedtime second night and following nights.      hydrochlorothiazide (HYDRODIURIL) 25 MG tablet Take 1 tablet (25 mg) by mouth daily  4/13/2022 at Unknown time     Milk Thistle 175 MG CAPS Take 2 capsules by mouth daily Past Week at Unknown time     Misc Natural Products (URINOZINC PROSTATE PO) Take 1 capsule by mouth 2 times daily  Past Week at Unknown time     oxyCODONE (ROXICODONE) 5 MG tablet Take 1-2 tablets (5-10 mg) by mouth every 3 hours as needed for pain (Moderate to Severe)      QUERCETIN PO Take 1 tablet by mouth daily Past Week at Unknown time     saw palmetto fruit 450 mg cap Take 900 mg by mouth daily  Past Week at Unknown time     UNABLE TO FIND Vitamin: Special 2 Past Week at Unknown time     Zinc 100 MG TABS Take 100 mg by mouth daily Past Week at Unknown time       Information source(s): [facility]  Method of interview communication: phone    Summary of Changes to PTA Med List  New: atenolol  Discontinued: Lipitor  Changed: OTC    Patient was asked about OTC/herbal products specifically.  PTA med list reflects this.    In the past week, patient estimated taking medication this percent of the time:  greater than 90%.    Allergies were reviewed, assessed, and updated with the patient.      Patient does not use any multi-dose medications prior to admission.    The information provided in this note is only as accurate as the sources available at the time of the update(s).    Thank you for the opportunity to participate in the care of this patient.    Basilia Valencia [facility]  4/14/2022 3:46 PM

## 2022-04-14 NOTE — ANESTHESIA POSTPROCEDURE EVALUATION
Patient: Jose Bailey    Procedure: Procedure(s):  HERNIORRHAPHY, INCISIONAL, ROBOT-ASSISTED, LAPAROSCOPIC, USING DA RAJEEV XI BILATERAL TRANSVERSUS ABDOMINUS RELEASE; LYSIS OF ADHESIONS       Anesthesia Type:  General    Note:  Disposition: Inpatient   Postop Pain Control: Uneventful            Sign Out: Well controlled pain   PONV: No   Neuro/Psych: Uneventful            Sign Out: Acceptable/Baseline neuro status   Airway/Respiratory: Uneventful            Sign Out: ABNORMAL respiratory status; O2 supplementation               Respiratory Exam: Normal RR               Oxygen: Face mask   CV/Hemodynamics: Uneventful            Sign Out: Acceptable CV status; No obvious hypovolemia; No obvious fluid overload   Other NRE: NONE   DID A NON-ROUTINE EVENT OCCUR? No    Event details/Postop Comments:  Required prolonged stay in PACU due to hypoxia. Patient awake and responsive but continued to receive supplemental oxygen. Ultimately decision made for inpatient stay given O2 requirement.           Last vitals:  Vitals Value Taken Time   /78 04/14/22 1441   Temp 36.1  C (96.9  F) 04/14/22 1245   Pulse 55 04/14/22 1443   Resp 24 04/14/22 1441   SpO2 94 % 04/14/22 1443   Vitals shown include unvalidated device data.    Electronically Signed By: Almas Loo MD  April 14, 2022  3:40 PM

## 2022-04-15 VITALS
WEIGHT: 237.8 LBS | SYSTOLIC BLOOD PRESSURE: 144 MMHG | RESPIRATION RATE: 18 BRPM | OXYGEN SATURATION: 96 % | DIASTOLIC BLOOD PRESSURE: 67 MMHG | TEMPERATURE: 98.4 F | HEART RATE: 79 BPM | BODY MASS INDEX: 32.25 KG/M2

## 2022-04-15 LAB
ANION GAP SERPL CALCULATED.3IONS-SCNC: 7 MMOL/L (ref 5–18)
BUN SERPL-MCNC: 28 MG/DL (ref 8–22)
CALCIUM SERPL-MCNC: 8.5 MG/DL (ref 8.5–10.5)
CHLORIDE BLD-SCNC: 99 MMOL/L (ref 98–107)
CO2 SERPL-SCNC: 26 MMOL/L (ref 22–31)
CREAT SERPL-MCNC: 1.36 MG/DL (ref 0.7–1.3)
GFR SERPL CREATININE-BSD FRML MDRD: 57 ML/MIN/1.73M2
GLUCOSE BLD-MCNC: 102 MG/DL (ref 70–125)
HOLD SPECIMEN: NORMAL
POTASSIUM BLD-SCNC: 4.6 MMOL/L (ref 3.5–5)
SODIUM SERPL-SCNC: 132 MMOL/L (ref 136–145)

## 2022-04-15 PROCEDURE — 36415 COLL VENOUS BLD VENIPUNCTURE: CPT | Performed by: INTERNAL MEDICINE

## 2022-04-15 PROCEDURE — 250N000013 HC RX MED GY IP 250 OP 250 PS 637: Performed by: SURGERY

## 2022-04-15 PROCEDURE — 250N000013 HC RX MED GY IP 250 OP 250 PS 637: Performed by: INTERNAL MEDICINE

## 2022-04-15 PROCEDURE — 99024 POSTOP FOLLOW-UP VISIT: CPT | Performed by: NURSE PRACTITIONER

## 2022-04-15 PROCEDURE — 80048 BASIC METABOLIC PNL TOTAL CA: CPT | Performed by: INTERNAL MEDICINE

## 2022-04-15 PROCEDURE — 99213 OFFICE O/P EST LOW 20 MIN: CPT | Performed by: INTERNAL MEDICINE

## 2022-04-15 PROCEDURE — 250N000011 HC RX IP 250 OP 636: Performed by: SURGERY

## 2022-04-15 RX ORDER — CYCLOBENZAPRINE HCL 5 MG
5 TABLET ORAL 3 TIMES DAILY PRN
Qty: 18 TABLET | Refills: 0 | Status: SHIPPED | OUTPATIENT
Start: 2022-04-15 | End: 2022-04-15

## 2022-04-15 RX ORDER — ATENOLOL 50 MG/1
50 TABLET ORAL DAILY
Qty: 30 TABLET | Refills: 3 | Status: SHIPPED | OUTPATIENT
Start: 2022-04-15

## 2022-04-15 RX ORDER — OXYCODONE HYDROCHLORIDE 5 MG/1
5-10 TABLET ORAL EVERY 4 HOURS PRN
Qty: 30 TABLET | Refills: 0 | Status: SHIPPED | OUTPATIENT
Start: 2022-04-15 | End: 2022-05-23

## 2022-04-15 RX ORDER — GABAPENTIN 100 MG/1
CAPSULE ORAL
Qty: 30 CAPSULE | Refills: 0 | Status: SHIPPED | OUTPATIENT
Start: 2022-04-15 | End: 2022-05-23

## 2022-04-15 RX ORDER — DOXAZOSIN 1 MG/1
2 TABLET ORAL AT BEDTIME
Status: DISCONTINUED | OUTPATIENT
Start: 2022-04-15 | End: 2022-04-15 | Stop reason: HOSPADM

## 2022-04-15 RX ORDER — TAMSULOSIN HYDROCHLORIDE 0.4 MG/1
0.4 CAPSULE ORAL DAILY
Status: DISCONTINUED | OUTPATIENT
Start: 2022-04-15 | End: 2022-04-15

## 2022-04-15 RX ORDER — ATENOLOL 25 MG/1
50 TABLET ORAL DAILY
Status: DISCONTINUED | OUTPATIENT
Start: 2022-04-15 | End: 2022-04-15 | Stop reason: HOSPADM

## 2022-04-15 RX ORDER — DOXAZOSIN 2 MG/1
2 TABLET ORAL AT BEDTIME
Qty: 30 TABLET | Refills: 0 | Status: SHIPPED | OUTPATIENT
Start: 2022-04-15 | End: 2022-07-07

## 2022-04-15 RX ADMIN — OXYCODONE HYDROCHLORIDE 5 MG: 5 TABLET ORAL at 01:53

## 2022-04-15 RX ADMIN — ATENOLOL 50 MG: 25 TABLET ORAL at 16:01

## 2022-04-15 RX ADMIN — Medication 2000 MG: at 08:09

## 2022-04-15 RX ADMIN — DOCUSATE SODIUM 100 MG: 100 CAPSULE, LIQUID FILLED ORAL at 08:09

## 2022-04-15 RX ADMIN — OXYCODONE HYDROCHLORIDE 10 MG: 5 TABLET ORAL at 12:10

## 2022-04-15 RX ADMIN — HYDROCHLOROTHIAZIDE 25 MG: 25 TABLET ORAL at 08:09

## 2022-04-15 RX ADMIN — OXYCODONE HYDROCHLORIDE 10 MG: 5 TABLET ORAL at 08:08

## 2022-04-15 RX ADMIN — OXYCODONE HYDROCHLORIDE 10 MG: 5 TABLET ORAL at 16:08

## 2022-04-15 RX ADMIN — ASPIRIN 81 MG: 81 TABLET, COATED ORAL at 08:09

## 2022-04-15 RX ADMIN — HYDROMORPHONE HYDROCHLORIDE 0.4 MG: 0.2 INJECTION, SOLUTION INTRAMUSCULAR; INTRAVENOUS; SUBCUTANEOUS at 02:08

## 2022-04-15 NOTE — PROGRESS NOTES
[facility] [place] Internal Medicine Progress Note      ASSESSMENT:    Active Problems:    Essential hypertension    Obstructive sleep apnea syndrome    CKD (chronic kidney disease) stage 3, GFR 30-59 ml/min (H)    Abdominal aortic aneurysm (AAA) (H)    Abdominal wall hernia    Bradycardia      PLAN:   66-year-old male with history of hypertension, CKD 3, known AAA, sinus bradycardia and history of incisional hernia who presents for laparoscopic herniorrhaphy 4/14/2022.  [facility] consulted for hypertension management      Hypertension: Noted history of sinus bradycardia likely secondary to accumulation of atenolol in the setting of CKD  --BMP and magnesium  --Hold additional atenolol given noted bradycardia while inpatient  --Continue home hydrochlorothiazide and doxazosin, consider initiation of low-dose metoprolol versus amlodipine for BP control        History of AAA: Status post prior endovascular stent graft repair 12/1/2022  --Continue prior to admission aspirin, patient no longer taking statin      CKD 3: Recheck BMP now, adjust meds as needed      History of BPH: Continue doxazosin. Noted is postop urinary retention and straight cath is ordered.       CONNIE: does not wear CPAP      Tobacco dependence: Counseled on cessation and replacement Rx is declined by the patient       DVT PPX:MIKAYLA Apodaca D.O.              -------------------------------------------------------------------------------------------------------------  SUBJECTIVE: NAD. Denies any nausea, vomiting, abdominal pain, chest pain, SOB, new swelling, fevers, chills, confusion or headache.  Having difficulty urinating postprocedure    Exam:  /63 (BP Location: Right arm, Patient Position: Supine)   Pulse 60   Temp 97.6  F (36.4  C) (Oral)   Resp 20   Wt 107.9 kg (237 lb 12.8 oz)   SpO2 93%   BMI 32.25 kg/m    General: NAD  RESPIRATORY: Breathing nonlabored  CARDIOVASCULAR: No le edema bilat.   ABDOMEN: Deferred  NEUROLOGIC: Motor and sensory  intact, speech clear        Diagnostics Reviewed:      No results found for this or any previous visit (from the past 24 hour(s)).

## 2022-04-15 NOTE — PROGRESS NOTES
ASSESSMENT:  1. Abdominal wall hernia        Jose Bailey is a 66 year old male who is s/p robotic TAR on 4/14/22. He is doing well from a surgical perspective, but he has had issues with his voiding. Patient has a history of this after surgery. He is already on doxazosin. We will straight cath this morning. If he is not able to void on his own by this evening, we will send him home with a fraga catheter for the weekend.    PLAN:  Straight cath, ambulate, shower  Lidocaine cream for pain at tip of penis from straight caths  Home if able to void  If unable to void by dinner time, we will insert a fraga catheter and set patient up for fraga removal next Monday    Addendum: Patient voided twice with 150 ml out each time. We will let him discharge home. No fraga needed.    SUBJECTIVE:   He is doing ok with pain. He is tolerating a regular diet. He has voided some small amounts but reports that he cant void because the tip of his penis hurts. He has been straight cathed and is about to have another straight cath performed due to over 600 ml on the bladder scan. He reports that he had a history of this with his previous surgeries. He was previously on Flomax and was switched to doxazosin.       Patient Vitals for the past 24 hrs:   BP Temp Temp src Pulse Resp SpO2   04/15/22 0711 116/56 98.2  F (36.8  C) Oral 68 18 93 %   04/15/22 0425 -- -- -- -- -- 90 %   04/15/22 0418 119/61 98  F (36.7  C) Oral 65 18 (!) 89 %   04/14/22 2310 118/63 98.5  F (36.9  C) Oral 57 20 91 %   04/14/22 2103 116/59 98.2  F (36.8  C) Oral 61 20 95 %   04/14/22 1933 118/63 -- -- -- -- 93 %   04/14/22 1930 -- -- -- 60 -- (!) 89 %   04/14/22 1845 (!) 178/85 -- -- 78 20 95 %   04/14/22 1700 (!) 168/83 -- -- 51 20 98 %   04/14/22 1630 (!) 158/76 97.6  F (36.4  C) Oral 54 18 98 %   04/14/22 1615 (!) 151/82 97.6  F (36.4  C) Oral 62 18 98 %   04/14/22 1600 (!) 155/78 97.8  F (36.6  C) Oral 51 18 99 %   04/14/22 1556 (!) 167/76 97.8  F (36.6  C)  Axillary 55 20 95 %   04/14/22 1515 (!) 163/77 97.6  F (36.4  C) Oral 55 18 97 %   04/14/22 1510 -- -- -- 60 -- 98 %   04/14/22 1458 (!) 180/85 97.5  F (36.4  C) Oral 50 16 100 %   04/14/22 1430 (!) 172/77 -- -- 61 17 93 %   04/14/22 1400 127/65 (P) 97.2  F (36.2  C) (P) Temporal 70 16 92 %   04/14/22 1330 135/63 -- -- 57 13 91 %   04/14/22 1300 134/68 -- -- 54 12 98 %   04/14/22 1245 (!) 140/74 96.9  F (36.1  C) Temporal 56 16 96 %   04/14/22 1230 137/68 -- -- 68 14 99 %   04/14/22 1215 139/66 -- -- 61 14 100 %   04/14/22 1200 (!) 154/78 -- -- 62 15 100 %   04/14/22 1155 (!) 164/81 98.4  F (36.9  C) Temporal 67 18 100 %         PHYSICAL EXAM:  GEN: No acute distress, comfortable  LUNGS: CTA bilaterally  CV:RRR  ABD:soft, not tender; incisions CDI  : penis not edematous and no discharge noted  EXT:No cyanosis, edema or obvious abnormalities    04/14 0700 - 04/15 0659  In: 2480 [P.O.:480; I.V.:2000]  Out: 1325 [Urine:1300]    Admission on 04/14/2022   Component Date Value     Sodium 04/14/2022 134 (A)     Potassium 04/14/2022 4.9      Chloride 04/14/2022 100      Carbon Dioxide (CO2) 04/14/2022 26      Anion Gap 04/14/2022 8      Urea Nitrogen 04/14/2022 30 (A)     Creatinine 04/14/2022 1.39 (A)     Calcium 04/14/2022 8.6      Glucose 04/14/2022 146 (A)     GFR Estimate 04/14/2022 55 (A)     Magnesium 04/14/2022 2.6      Hold Specimen 04/14/2022 JIC      Sodium 04/15/2022 132 (A)     Potassium 04/15/2022 4.6      Chloride 04/15/2022 99      Carbon Dioxide (CO2) 04/15/2022 26      Anion Gap 04/15/2022 7      Urea Nitrogen 04/15/2022 28 (A)     Creatinine 04/15/2022 1.36 (A)     Calcium 04/15/2022 8.5      Glucose 04/15/2022 102      GFR Estimate 04/15/2022 57 (A)          Nahomy Davalos APRN CNP

## 2022-04-15 NOTE — PLAN OF CARE
"PRIMARY DIAGNOSIS: \"GENERIC\" NURSING  OUTPATIENT/OBSERVATION GOALS TO BE MET BEFORE DISCHARGE:  ADLs back to baseline: Yes    Activity and level of assistance: Ambulating independently.    Pain status: Improved-controlled with oral pain medications.    Return to near baseline physical activity: Yes     Discharge Planner Nurse   Safe discharge environment identified: Yes  Barriers to discharge: No       Entered by: Sara Sue 04/15/2022 6:59 PM  Patient able to void several times this shift.  He was able to be discharged at 530 PM. Discharge instructions reviewed. Medications arrived from pharmacy and sent with patient. Patient brought down by w/c with belongings,.daughter waiting in front [place].     Please review provider order for any additional goals.   Nurse to notify provider when observation goals have been met and patient is ready for discharge.Goal Outcome Evaluation:                      "

## 2022-04-15 NOTE — PROGRESS NOTES
[facility] Medicine Progress Note      ASSESSMENT:    Active Problems:    Essential hypertension    Obstructive sleep apnea syndrome    CKD (chronic kidney disease) stage 3, GFR 30-59 ml/min (H)    Abdominal aortic aneurysm (AAA) (H)    Abdominal wall hernia    Bradycardia      PLAN:   66-year-old male with history of hypertension, CKD 3, known AAA, sinus bradycardia and history of incisional hernia who presents for laparoscopic herniorrhaphy 4/14/2022.  [facility] consulted for hypertension management      Hypertension: Noted history of sinus bradycardia likely secondary to accumulation of atenolol in the setting of CKD  --BMP and magnesium acceptable  --reduced home dose atenolol to 50mg daily given noted bradycardia while inpatient  --Continue home hydrochlorothiazide and increase home dose doxazosin to help with urinary retention        History of AAA: Status post prior endovascular stent graft repair 12/1/2022  --Continue prior to admission aspirin, patient no longer taking statin      CKD 3: GFR stable post op      History of BPH: Noted is postop urinary retention and straight cath is ordered as needed.   -- increase hiome dose doxazosin to 2mg daily after discharge      CONNIE: does not wear CPAP      Tobacco dependence: Counseled on cessation and replacement Rx is declined by the patient       DVT PPX:MIKAYLA Apodaca D.O.              -------------------------------------------------------------------------------------------------------------  SUBJECTIVE: NAD. Denies any nausea, vomiting, abdominal pain, chest pain, SOB, new swelling, fevers, chills, confusion or headache.    Urinary retention ongoing    Exam:  /61 (BP Location: Right arm, Patient Position: Supine)   Pulse 65   Temp 98  F (36.7  C) (Oral)   Resp 18   Wt 107.9 kg (237 lb 12.8 oz)   SpO2 90%   BMI 32.25 kg/m    General: NAD  RESPIRATORY: Breathing nonlabored  CARDIOVASCULAR: No le edema bilat.   ABDOMEN:  Deferred  NEUROLOGIC: Motor and sensory intact, speech clear        Diagnostics Reviewed:      Recent Results (from the past 24 hour(s))   Extra Purple Top Tube    Collection Time: 04/14/22  8:51 PM   Result Value Ref Range    Hold Specimen [facility]    Basic metabolic panel    Collection Time: 04/14/22  9:01 PM   Result Value Ref Range    Sodium 134 (L) 136 - 145 mmol/L    Potassium 4.9 3.5 - 5.0 mmol/L    Chloride 100 98 - 107 mmol/L    Carbon Dioxide (CO2) 26 22 - 31 mmol/L    Anion Gap 8 5 - 18 mmol/L    Urea Nitrogen 30 (H) 8 - 22 mg/dL    Creatinine 1.39 (H) 0.70 - 1.30 mg/dL    Calcium 8.6 8.5 - 10.5 mg/dL    Glucose 146 (H) 70 - 125 mg/dL    GFR Estimate 55 (L) >60 mL/min/1.73m2   Magnesium    Collection Time: 04/14/22  9:01 PM   Result Value Ref Range    Magnesium 2.6 1.8 - 2.6 mg/dL   Basic metabolic panel    Collection Time: 04/15/22  5:52 AM   Result Value Ref Range    Sodium 132 (L) 136 - 145 mmol/L    Potassium 4.6 3.5 - 5.0 mmol/L    Chloride 99 98 - 107 mmol/L    Carbon Dioxide (CO2) 26 22 - 31 mmol/L    Anion Gap 7 5 - 18 mmol/L    Urea Nitrogen 28 (H) 8 - 22 mg/dL    Creatinine 1.36 (H) 0.70 - 1.30 mg/dL    Calcium 8.5 8.5 - 10.5 mg/dL    Glucose 102 70 - 125 mg/dL    GFR Estimate 57 (L) >60 mL/min/1.73m2

## 2022-04-15 NOTE — PLAN OF CARE
"  Problem: Postoperative Urinary Retention (Surgery Nonspecified)  Goal: Effective Urinary Elimination  Outcome: Ongoing, Not Progressing   Patient only voiding small amounts 25-50 ml urine this shift. Scanned bladder for 698 and S.C. for 550. Per patient daughter Lalita patient had this the same problem after his surgery for AAA. Patient reports having \"pain at tip of penis when trying to void which makes him tighten up and decreases his ability to void\" Will continue to monitor.  Patient voided this afternoon @ 2 PM  for 150 scan for 351. Will continue to monitor.  Problem: Plan of Care - These are the overarching goals to be used throughout the patient stay.    Goal: Plan of Care Review/Shift Note  Description: The Plan of Care Review/Shift note should be completed every shift.  The Outcome Evaluation is a brief statement about your assessment that the patient is improving, declining, or no change.  This information will be displayed automatically on your shift note.  Outcome: Ongoing, Progressing  Flowsheets (Taken 4/15/2022 1238)  Plan of Care Reviewed With:    patient    daughter  Goal: Optimal Comfort and Wellbeing  Outcome: Ongoing, Progressing  Intervention: Monitor Pain and Promote Comfort  Recent Flowsheet Documentation  Taken 4/15/2022 0820 by Lisa Morse, RN  Pain Management Interventions: medication (see MAR)     Problem: Pain (Surgery Nonspecified)  Goal: Acceptable Pain Control  Outcome: Ongoing, Progressing  Intervention: Prevent or Manage Pain  Recent Flowsheet Documentation  Taken 4/15/2022 0820 by Lisa Morse, RN  Pain Management Interventions: medication (see MAR)   Goal Outcome Evaluation:    Plan of Care Reviewed With: patient, daughter Lalita  Discussed issues with patient daughter about bladder and also concern for going home with some muscle relaxer. Informed will mention to surgery team. Also informed about the plan of possibly placing a fraga per surgery notes. Patient reports " abdominal discomfort 7/10 reporting mostly on right side of abdomen today. Giving PRN pain medication Q 4 hours and offered ice but declined. Will continue to monitor and offer emotional support.

## 2022-04-15 NOTE — PROGRESS NOTES
SPIRITUAL HEALTH SERVICES Progress Note    Saw pt Jose Bailey per admission screening.    Illness Narrative - Jose shared about his medical history and the various procedures he has had in the past related to his diverticulitis. He has 30+ years of Lyme's Disease and shared about the impact that has had on his health and quality of life. He is hopeful of being able to urinate so that he can discharged soon.     Distress - Ongoing health challenges and healing process.     Coping - Patient comes from [place] her[place] background and derives meaning, purpose, and comfort from hernan. He has connection to [facility]. Patient welcomed prayer and expressed appreciation for the visit.      Plan - A  will continue to visit as able or per request by patient/family/staff.      Gumaro Wiseman MDiv, [facility]  Lead Staff , [facility]  [phone]

## 2022-04-15 NOTE — PLAN OF CARE
Problem: Bowel Motility Impaired (Surgery Nonspecified)  Goal: Effective Bowel Elimination  Outcome: Ongoing, Not Progressing  Patient denies passing flatus, but states this is not abnormal for him; bowel sounds not audible. Tolerated oral intake with no subsequent increase in pain or nausea. Ambulating in room.    Problem: Postoperative Urinary Retention (Surgery Nonspecified)  Goal: Effective Urinary Elimination  Outcome: Ongoing, Not Progressing  Patient initially not able to void despite multiple attempts. States that it is very painful which impairs ability to void - discussed with hospitalist, Dr. Maradiaga, whom ordered one-time Urojet.   -Bladder scan volume 546cc at 1956  -PVR 682cc (after small void) at 2131.  -PVR 534cc (after small void) at 2328.  Patient does state that voiding is getting somewhat easier and less painful, but consistently refusing straight cath. Dr. Apodaca updated via text page.    Problem: Respiratory Compromise (Surgery Nonspecified)  Goal: Effective Oxygenation and Ventilation  Outcome: Ongoing, Progressing  SpO2 currently above 90% on room air, but has intermittently required 1-3L via nasal cannula during this shift. Continuous pulse oximetry in place. Instructed on use of IS.    Sara Draper RN

## 2022-04-25 ENCOUNTER — OFFICE VISIT (OUTPATIENT)
Dept: SURGERY | Facility: CLINIC | Age: 67
End: 2022-04-25
Payer: COMMERCIAL

## 2022-04-25 DIAGNOSIS — Z48.89 POSTOPERATIVE VISIT: Primary | ICD-10-CM

## 2022-04-25 PROCEDURE — 99024 POSTOP FOLLOW-UP VISIT: CPT | Performed by: SURGERY

## 2022-04-25 NOTE — PROGRESS NOTES
HPI: Jose Bailey is here for follow up after his abdominal wall reconstruction.  He is doing well with minimal pain.  He continues to wear his abdominal binder.  His main complaint is his poor motility in his colon which she attributes to his Lyme's disease.    Allergies, Medications, Social History, Past Medical History and Past Surgical History were reviewed and are noted in the chart.    There were no vitals taken for this visit.  There is no height or weight on file to calculate BMI.      EXAM:   GENERAL: Appears well  Abdomen-soft, no recurrent hernias, palpable small hernia sac and midline, well-healed port site incisions x6    Incision/Surgical Site 12/01/21 Left Groin (Active)       Incision/Surgical Site 12/01/21 Right Groin (Active)       Incision/Surgical Site 04/14/22 Abdomen (Active)       Assessment/Plan: Jose Bailey continues to do well. His wound is healing and overall progressing well.  At this point he will continue to wear his abdominal binder for total 6 weeks.  He can remove the Band-Aids and Steri-Strips from his incisions.  In terms of his poor colonic motility, he has plans to follow-up with his Lyme's disease doctor which I think is the correct approach.  I will have him follow-up with me in 2 to 4 weeks for ongoing evaluation and to ensure he continues to limit his activities and heals properly.    Farhan Alba DO [facility] Department of Surgery   Pt ready to transfer to the floor.

## 2022-04-25 NOTE — LETTER
4/25/2022         RE: Jose Bailey  [address] [place] [address]        Dear Colleague,    Thank you for referring your patient, Jose Bailey, to the [facility] [place]. Please see a copy of my visit note below.     HPI: Jose Bailey is here for follow up after his abdominal wall reconstruction.  He is doing well with minimal pain.  He continues to wear his abdominal binder.  His main complaint is his poor motility in his colon which she attributes to his Lyme's disease.    Allergies, Medications, Social History, Past Medical History and Past Surgical History were reviewed and are noted in the chart.    There were no vitals taken for this visit.  There is no height or weight on file to calculate BMI.      EXAM:   GENERAL: Appears well  Abdomen-soft, no recurrent hernias, palpable small hernia sac and midline, well-healed port site incisions x6    Incision/Surgical Site 12/01/21 Left Groin (Active)       Incision/Surgical Site 12/01/21 Right Groin (Active)       Incision/Surgical Site 04/14/22 Abdomen (Active)       Assessment/Plan: Jose Bailey continues to do well. His wound is healing and overall progressing well.  At this point he will continue to wear his abdominal binder for total 6 weeks.  He can remove the Band-Aids and Steri-Strips from his incisions.  In terms of his poor colonic motility, he has plans to follow-up with his Lyme's disease doctor which I think is the correct approach.  I will have him follow-up with me in 2 to 4 weeks for ongoing evaluation and to ensure he continues to limit his activities and heals properly.    Farhan Alba DO [facility] Department of Surgery      Again, thank you for allowing me to participate in the care of your patient.        Sincerely,        Farhan Alba, DO

## 2022-05-23 ENCOUNTER — VIRTUAL VISIT (OUTPATIENT)
Dept: SURGERY | Facility: CLINIC | Age: 67
End: 2022-05-23
Payer: COMMERCIAL

## 2022-05-23 DIAGNOSIS — Z48.89 POSTOPERATIVE VISIT: Primary | ICD-10-CM

## 2022-05-23 PROCEDURE — 99024 POSTOP FOLLOW-UP VISIT: CPT | Performed by: SURGERY

## 2022-05-23 RX ORDER — ERYTHROMYCIN 500 MG/1
TABLET, COATED ORAL
COMMUNITY
Start: 2022-05-10

## 2022-05-23 NOTE — PROGRESS NOTES
"  The patient has been notified of following:     \"This telephone visit will be conducted via a call between you and your physician/provider. We have found that certain health care needs can be provided without the need for a physical exam.  This service lets us provide the care you need with a short phone conversation.  If a prescription is necessary we can send it directly to your pharmacy.  If lab work is needed we can place an order for that and you can then stop by our lab to have the test done at a later time.    Telephone visits are billed at different rates depending on your insurance coverage. During this emergency period, for some insurers they may be billed the same as an in-person visit.  Please reach out to your insurance provider with any questions.    If during the course of the call the physician/provider feels a telephone visit is not appropriate, you will not be charged for this service.\"    Patient has given verbal consent to a Telephone visit? Yes    What phone number would you like to be contacted at? [phone]    Patient would like to receive their AVS by Francy    Additional provider notes: I spoke with the patient today regarding his postoperative status.  He is doing relatively well.  Minimal pain.  He has been to see his other physicians for his Lyme disease and is doing well from that standpoint.  At this point he will follow-up with me on a as needed basis.  I have recommended that he refrain from any heavy lifting or strenuous activities for total of 6 weeks from surgery.    Phone call duration: 10 minutes    Ubaldo Alba DO [facility] Surgery  [phone]    "

## 2022-05-23 NOTE — LETTER
"    5/23/2022         RE: Jose Bailey  [address]  [place] [facility] [address]        Dear Colleague,    Thank you for referring your patient, Jose Bailey, to the [facility] SURGERY CLINIC AND BARIATRICS [facility]. Please see a copy of my visit note below.      The patient has been notified of following:     \"This telephone visit will be conducted via a call between you and your physician/provider. We have found that certain health care needs can be provided without the need for a physical exam.  This service lets us provide the care you need with a short phone conversation.  If a prescription is necessary we can send it directly to your pharmacy.  If lab work is needed we can place an order for that and you can then stop by our lab to have the test done at a later time.    Telephone visits are billed at different rates depending on your insurance coverage. During this emergency period, for some insurers they may be billed the same as an in-person visit.  Please reach out to your insurance provider with any questions.    If during the course of the call the physician/provider feels a telephone visit is not appropriate, you will not be charged for this service.\"    Patient has given verbal consent to a Telephone visit? Yes    What phone number would you like to be contacted at? [phone]    Patient would like to receive their AVS by Francy    Additional provider notes: I spoke with the patient today regarding his postoperative status.  He is doing relatively well.  Minimal pain.  He has been to see his other physicians for his Lyme disease and is doing well from that standpoint.  At this point he will follow-up with me on a as needed basis.  I have recommended that he refrain from any heavy lifting or strenuous activities for total of 6 weeks from surgery.    Phone call duration: 10 minutes    Ubaldo Alba DO [facility] Surgery  [phone]        Again, thank you for allowing me to participate in " the care of your patient.        Sincerely,        Farhan Alba, DO

## 2022-05-31 ENCOUNTER — ANCILLARY PROCEDURE (OUTPATIENT)
Dept: RADIOLOGY | Facility: CLINIC | Age: 67
End: 2022-05-31
Payer: COMMERCIAL

## 2022-06-01 ENCOUNTER — ANCILLARY PROCEDURE (OUTPATIENT)
Dept: RADIOLOGY | Facility: CLINIC | Age: 67
End: 2022-06-01
Payer: COMMERCIAL

## 2022-07-07 ENCOUNTER — HOSPITAL ENCOUNTER (OUTPATIENT)
Dept: CT IMAGING | Facility: HOSPITAL | Age: 67
Discharge: HOME | End: 2022-07-07
Attending: SURGERY
Payer: COMMERCIAL

## 2022-07-07 ENCOUNTER — VIRTUAL VISIT (OUTPATIENT)
Dept: VASCULAR SURGERY | Facility: CLINIC | Age: 67
End: 2022-07-07
Attending: SURGERY
Payer: COMMERCIAL

## 2022-07-07 DIAGNOSIS — I71.40 ABDOMINAL AORTIC ANEURYSM (AAA) WITHOUT RUPTURE (H): ICD-10-CM

## 2022-07-07 DIAGNOSIS — I71.40 ABDOMINAL AORTIC ANEURYSM (AAA) WITHOUT RUPTURE (H): Primary | ICD-10-CM

## 2022-07-07 LAB
CREAT BLD-MCNC: 1.5 MG/DL (ref 0.7–1.3)
GFR SERPL CREATININE-BSD FRML MDRD: 50 ML/MIN/1.73M2

## 2022-07-07 PROCEDURE — 250N000011 HC RX IP 250 OP 636: Performed by: SURGERY

## 2022-07-07 PROCEDURE — 74174 CTA ABD&PLVS W/CONTRAST: CPT

## 2022-07-07 PROCEDURE — 82565 ASSAY OF CREATININE: CPT

## 2022-07-07 PROCEDURE — 99442 PR PHYSICIAN TELEPHONE EVALUATION 11-20 MIN: CPT | Performed by: SURGERY

## 2022-07-07 RX ORDER — IOPAMIDOL 755 MG/ML
100 INJECTION, SOLUTION INTRAVASCULAR ONCE
Status: COMPLETED | OUTPATIENT
Start: 2022-07-07 | End: 2022-07-07

## 2022-07-07 RX ADMIN — IOPAMIDOL 75 ML: 755 INJECTION, SOLUTION INTRAVENOUS at 09:36

## 2022-07-07 NOTE — PROGRESS NOTES
VASCULAR SURGERY OUTPATIENT VIRTUAL CONSULT OR VISIT   VASCULAR SURGEON: Sheryl Fontanez MD    LOCATION:  Virtual visit done using telephone as patient only has a flip phone and unable to do video visits    Jose Bailey   Medical Record #:  [id number]  YOB: 1955  Age:  66 year old     Date of Service: 7/7/2022    PRIMARY CARE PROVIDER: No Ref-Primary, Physician      Reason for consultation: Surveillance of endovascular aortic aneurysm repair    IMPRESSION: Overall doing well from his endovascular aortic aneurysm repair with decreasing aneurysm sac size from 79.8 mm x 67.4 mm to 71 mm by 58 mm.  Previously had seen a delayed type II endoleak but we do not see this although no delayed contrast was given.  Patient's current issues of recurrent bowel obstructions with 1 little over a month ago in [place].  Underwent hernia repair with mesh and significant lysis of adhesions by Dr. Arroyo in April.    RECOMMENDATION: Overall doing well from our standpoint.  I will see him back in a years time with a noncontrast CT abdomen pelvis as well as ultrasound for endovascular aortic graft surveillance.  I will try to reach out to Dr. Alba to find out who he was trying to refer him to.    HPI:  Jose Bailey is a 66 year old male who was evaluated today for surveillance of his endovascular repair which were performed for him in December 2021.  We did an endovascular pair because he had significant weakness from what he felt was Lyme's disease.  I did not think that he would recover well from an open repair.  The patient also had a large incisional hernia that would contemplating having general surgery repair at the same time and so this would be a lot to put him through.  Said he underwent the endovascular aortic aneurysm repair successfully without complication and then in April had robotic laparoscopic hernia repair.    The patient is still dealing with chronic constipation however.  He also tells me that he  had symptoms of what sound like a bowel obstruction for which he had to go to the emergency room and have NG tube drainage in [place] this past month.  He tells me that when he last saw Dr. Marrero in May he had discussed with him seeing a specialist for GI tract and nerves but I cannot see this in the note and the patient tells me that the referral never came through.    No other new active issues.    PHH:    Past Medical History:   Diagnosis Date     AAA (abdominal aortic aneurysm) (H)      Abdominal aortic aneurysm (AAA) (H) 12/1/2021     THOMPSON (acute kidney injury) (H) 10/17/2021     Benign prostatic hyperplasia with urinary obstruction 6/10/2008     CKD (chronic kidney disease) stage 3, GFR 30-59 ml/min (H) 10/17/2021     Diverticulitis      Enlarging abdominal aortic aneurysm (AAA) (H) 10/16/2021     Essential hypertension 5/19/2008     Irritable bowel syndrome 7/17/2008     Obstructive sleep apnea syndrome 8/28/2008     Pain of right upper arm 12/1/2021     SBO (small bowel obstruction) (H) 10/16/2021     Tobacco use disorder 10/17/2021        Past Surgical History:   Procedure Laterality Date     COLECTOMY       CYSTOSCOPY       ESTABLISHMENT, VASCULAR ACCESS, FEM APPROACH, FOR ENDOVASC STENT INSERTION INTO ABD AORTIC ANEURYSM N/A 12/1/2021    Procedure: Endovascular repair of abdominal aortic aneurysm with endograft using 2 docking limbs.  Large-bore access closure x2.;  Surgeon: Sheryl Fontanez MD;  Location: [facility] OR     HERNIORRHAPHY, INCISIONAL, ROBOT-ASSISTED, LAPAROSCOPIC, DA RAJEEV XI, W/TRANSVERSE ABDOMINIS RELEASE N/A 4/14/2022    Procedure: HERNIORRHAPHY, INCISIONAL, ROBOT-ASSISTED, LAPAROSCOPIC, USING DA RAJEEV XI BILATERAL TRANSVERSUS ABDOMINUS RELEASE; LYSIS OF ADHESIONS;  Surgeon: Farhan Alba DO;  Location: [facility] OR     IR ABDOMINAL AORTOGRAM  12/1/2021       ALLERGIES:  Patient has no known allergies.    MEDS:    Current Outpatient Medications:      ascorbic acid, vitamin  C, (VITAMIN C) 1000 MG tablet, Take 2,000 mg by mouth daily , Disp: , Rfl:      aspirin (ASA) 81 MG EC tablet, Take 1 tablet (81 mg) by mouth daily, Disp: , Rfl:      atenolol (TENORMIN) 50 MG tablet, Take 1 tablet (50 mg) by mouth daily, Disp: 30 tablet, Rfl: 3     b complex vitamins (B COMPLEX 1) tablet, Take 1 tablet by mouth daily , Disp: , Rfl:      Charcoal Activated (ACTIVATED CHARCOAL PO), Take 2 capsules by mouth daily, Disp: , Rfl:      cholecalciferol (VITAMIN D3) 125 mcg (5000 units) capsule, Take 125 mcg by mouth daily, Disp: , Rfl:      docusate sodium (STOOL SOFTENER) 100 MG capsule, Take 100 mg by mouth daily , Disp: , Rfl:      erythromycin base 500 MG TABS, , Disp: , Rfl:      fish oil-omega-3 fatty acids 1000 MG capsule, Take 4 g by mouth 2 times daily, Disp: , Rfl:      Milk Thistle 175 MG CAPS, Take 2 capsules by mouth daily, Disp: , Rfl:      Misc Natural Products (URINOZINC PROSTATE PO), Take 1 capsule by mouth 2 times daily , Disp: , Rfl:      QUERCETIN PO, Take 1 tablet by mouth daily, Disp: , Rfl:      saw palmetto fruit 450 mg cap, Take 900 mg by mouth daily , Disp: , Rfl:      UNABLE TO FIND, Vitamin: Special 2, Disp: , Rfl:      Zinc 100 MG TABS, Take 100 mg by mouth daily, Disp: , Rfl:   No current facility-administered medications for this visit.    SOCIAL HABITS:    History   Smoking Status     Current Every Day Smoker     Packs/day: 1.50     Years: 53.00     Types: Cigarettes     Start date: 1968   Smokeless Tobacco     Never Used     Comment: seen by TTS 10/18/2021     Social History    Substance and Sexual Activity      Alcohol use: Not Currently      History   Drug Use No       FAMILY HISTORY:    Family History   Problem Relation Age of Onset     Cerebrovascular Disease Mother      Aneurysm Mother      No Known Problems Father        REVIEW OF SYSTEMS:    A 12 point ROS was reviewed and except for what is listed in the HPI above, all others are negative    PE:  There were no  vitals taken for this visit.  Wt Readings from Last 1 Encounters:   04/14/22 107.9 kg (237 lb 12.8 oz)     There is no height or weight on file to calculate BMI.    EXAM:  EXAM LIMITED AS THIS IS A VIRTUAL VISIT with telephone            DIAGNOSTIC STUDIES:     Images:  No results found.    I personally reviewed the images and my interpretation is that the patient's CTA shows good positioning of his aortic endograft and significant sac/79 mm to 71 mm.  No definite imaging to look for endoleak but not obvious.    LABS:      Sodium   Date Value Ref Range Status   04/15/2022 132 (L) 136 - 145 mmol/L Final   04/14/2022 134 (L) 136 - 145 mmol/L Final   12/02/2021 137 136 - 145 mmol/L Final     Urea Nitrogen   Date Value Ref Range Status   04/15/2022 28 (H) 8 - 22 mg/dL Final   04/14/2022 30 (H) 8 - 22 mg/dL Final   12/02/2021 22 8 - 22 mg/dL Final     Hemoglobin   Date Value Ref Range Status   12/02/2021 11.9 (L) 13.3 - 17.7 g/dL Final   12/01/2021 14.1 13.3 - 17.7 g/dL Final   10/18/2021 13.2 (L) 13.3 - 17.7 g/dL Final     Platelet Count   Date Value Ref Range Status   12/02/2021 249 150 - 450 10e3/uL Final   12/01/2021 280 150 - 450 10e3/uL Final   12/01/2021 317 150 - 450 10e3/uL Final     INR   Date Value Ref Range Status   12/01/2021 0.97 0.90 - 1.15 Final     This is a telephone visit with the start time of 12:45 PM and end time of 12:55 PM.  20 minutes spent on the day of encounter doing chart review, history and exam, documentation, and further activities as noted.     Sheryl Fontanez MD, MD  VASCULAR SURGERY

## 2022-07-08 DIAGNOSIS — K59.09 OTHER CONSTIPATION: Primary | ICD-10-CM

## 2022-07-19 ENCOUNTER — TELEPHONE (OUTPATIENT)
Dept: GASTROENTEROLOGY | Facility: CLINIC | Age: 67
End: 2022-07-19

## 2022-07-19 NOTE — TELEPHONE ENCOUNTER
[facility]    Phone Message    May a detailed message be left on voicemail: Yes    Reason for Call: Other: Patient is currently scheduled on 11/4, as a patient New GI Urgent. This is outside the expected timeline for this schedule. Paitent has been added to the waitlist.      Action Taken: Message routed to:  Other: GI REFERRAL TRIAGE POOL     Travel Screening: Not Applicable

## 2022-07-19 NOTE — TELEPHONE ENCOUNTER
Carmen, can you please see if this patient is able to be scheduled before November.     Thank You  Keke Simmons Rn

## 2022-08-02 ENCOUNTER — OFFICE VISIT (OUTPATIENT)
Dept: GASTROENTEROLOGY | Facility: CLINIC | Age: 67
End: 2022-08-02
Payer: COMMERCIAL

## 2022-08-02 VITALS
SYSTOLIC BLOOD PRESSURE: 138 MMHG | WEIGHT: 233 LBS | BODY MASS INDEX: 31.56 KG/M2 | HEIGHT: 72 IN | DIASTOLIC BLOOD PRESSURE: 74 MMHG

## 2022-08-02 DIAGNOSIS — K59.00 CONSTIPATION, UNSPECIFIED CONSTIPATION TYPE: Primary | ICD-10-CM

## 2022-08-02 DIAGNOSIS — K59.09 OTHER CONSTIPATION: ICD-10-CM

## 2022-08-02 PROCEDURE — 99203 OFFICE O/P NEW LOW 30 MIN: CPT | Performed by: INTERNAL MEDICINE

## 2022-08-02 ASSESSMENT — PAIN SCALES - GENERAL: PAINLEVEL: NO PAIN (0)

## 2022-08-02 NOTE — LETTER
"    8/2/2022         RE: Jose Bailey  [id number] [place] [address]        Dear Colleague,    Thank you for referring your patient, Jose Bailey, to the [facility]. Please see a copy of my visit note below.    Gastroenterology    66-year-old male to review chronic constipation.  Patient notes that 1985 he had a deer tick on him and he went nondiagnostic for 23 years and tell a provider started treating him for Lyme disease.  Since then he has been on antibiotics for roughly 16 years duration.  He notes that he had to sell his house.  His wife left there has been previous life stressors.  He recalls being evaluated at [facility] in 1987 and told he was entirely normal.  Patient notes that stools since his tick exposure have gone from round to oval.  He was concerned that there is a band in his anastomosis there is a sigmoid resection which he underwent for a perforated diverticulum and in 2007-- 2008.  In addition patient has episodic tight spasm in the rectal area.  He notes his testes are contracted in the morning but other times they are descended.  There has been electric sensation at times from his penis believes that needles.  He also has scattered pelvic sensations and sometimes a \"hat pin\" sensation deep in his pelvis.  His current chiropractor has had trouble adjusting his back his previous  Chiropractor with no pressure on his sacral regions and that was more helpful.  Patient is concerned that may be the jaw his tick is still in the medial aspect of his left calf in the region of previous tick.    New overall supplement called MMS.  Unclear ingredients.    Patient has some my concerns about previous things in the past where he would be confused with a dial fallen and with misplaced the numbers.  Sometimes when he would eat his dinner and the food on his plate he ends up pushing it the wrong way and soda towards them.  He raises a question of whether these nerve concerns could " be affecting his colon.    Currently patient is on a colon cleanse which contains senna.  Quique plus super colon cleanse  Is Wednesday Thursday Friday and had a looser stool the following Saturday.  At one time he was taking prune juice but this is no longer effective.  He also notes MiraLAX is no longer effective.  He is no longer on Colace.  In addition he will use Ducolax suppository daily.  Previous senna alone apparently was ineffective as well.  Diet is reduced however currently because of concerns of a recent small bowel obstruction.    Past medical history: AAA repair, history of renal injury, BPH, chronic renal disease, diverticular disease, essential hypertension, IBS, obstructive sleep apnea, prior small bowel obstruction October 2021, tobacco history.    Past surgical history: Sigmoid resection, endovascular stent for AAA repair, abdominal herniorrhaphy April 2022    Medications reviewed on epic    Allergies reviewed on epic    Habits:  Tobacco 1 and half ppd, no alcohol    Family history noncontributory    Review of systems otherwise negative noncontributory    No acute distress.  Blood pressure 1 3874, pulse 80, afebrile, BMI 31.60, head and neck unremarkable cardiac S1-S2 without murmur, lungs clear throughout, abdomen no organomegaly, numbness in the regions of the hernia repair in the midline sensations are variable but improved, lower extremity edema skin without rash    Abdominal CT: 5/31/2022 changes consistent with small bowel obstruction cholelithiasis decreased size of AAA , atrophic spleen diverticulosis prostatic calcifications, small umbilical fat-containing ventral hernia.    Follow-up colonoscopy due in 2018 [facility] system.  Patient notes he is current    Calcium 9.9, TSH not available    Impression: Chronic constipation for a long duration.  Current program senna based treatment with his Ducolax may be reasonable.  Sometimes established bowel programs self sort and may work out for  the patient.  Uncertain whether Linzess, prucalopride, etc. would be better than he is currently using.    Plan: 1.  Sometimes the fine print on medications may indicate no long-term studies have been performed and this is also seen with MiraLAX.  Senna is a reasonable long-term solution and has been used in a healthcare long time.  Previous concerns about nerve and animal studies have not been validated and senna is felt to be okay.  2 TSH for completeness if have not been checked in the last year, 3.  Follow-up colonoscopy as previous schedule  4.  Check with primary regarding neurological concerns but I cannot implicate phone  Numbers dailing confusion and the higher brain function as contributing to chronic constipation.    As needed return          Again, thank you for allowing me to participate in the care of your patient.        Sincerely,        Tam Ignacio MD

## 2022-08-02 NOTE — PROGRESS NOTES
"Gastroenterology    66-year-old male to review chronic constipation.  Patient notes that 1985 he had a deer tick on him and he went nondiagnostic for 23 years and tell a provider started treating him for Lyme disease.  Since then he has been on antibiotics for roughly 16 years duration.  He notes that he had to sell his house.  His wife left there has been previous life stressors.  He recalls being evaluated at [facility] in 1987 and told he was entirely normal.  Patient notes that stools since his tick exposure have gone from round to oval.  He was concerned that there is a band in his anastomosis there is a sigmoid resection which he underwent for a perforated diverticulum and in 2007-- 2008.  In addition patient has episodic tight spasm in the rectal area.  He notes his testes are contracted in the morning but other times they are descended.  There has been electric sensation at times from his penis believes that needles.  He also has scattered pelvic sensations and sometimes a \"hat pin\" sensation deep in his pelvis.  His current chiropractor has had trouble adjusting his back his previous  Chiropractor with no pressure on his sacral regions and that was more helpful.  Patient is concerned that may be the jaw his tick is still in the medial aspect of his left calf in the region of previous tick.    New overall supplement called MMS.  Unclear ingredients.    Patient has some my concerns about previous things in the past where he would be confused with a dial fallen and with misplaced the numbers.  Sometimes when he would eat his dinner and the food on his plate he ends up pushing it the wrong way and soda towards them.  He raises a question of whether these nerve concerns could be affecting his colon.    Currently patient is on a colon cleanse which contains senna.  Quique plus super colon cleanse  Is Wednesday Thursday Friday and had a looser stool the following Saturday.  At one time he was taking prune juice " but this is no longer effective.  He also notes MiraLAX is no longer effective.  He is no longer on Colace.  In addition he will use Ducolax suppository daily.  Previous senna alone apparently was ineffective as well.  Diet is reduced however currently because of concerns of a recent small bowel obstruction.    Past medical history: AAA repair, history of renal injury, BPH, chronic renal disease, diverticular disease, essential hypertension, IBS, obstructive sleep apnea, prior small bowel obstruction October 2021, tobacco history.    Past surgical history: Sigmoid resection, endovascular stent for AAA repair, abdominal herniorrhaphy April 2022    Medications reviewed on epic    Allergies reviewed on epic    Habits:  Tobacco 1 and half ppd, no alcohol    Family history noncontributory    Review of systems otherwise negative noncontributory    No acute distress.  Blood pressure 1 3874, pulse 80, afebrile, BMI 31.60, head and neck unremarkable cardiac S1-S2 without murmur, lungs clear throughout, abdomen no organomegaly, numbness in the regions of the hernia repair in the midline sensations are variable but improved, lower extremity edema skin without rash    Abdominal CT: 5/31/2022 changes consistent with small bowel obstruction cholelithiasis decreased size of AAA , atrophic spleen diverticulosis prostatic calcifications, small umbilical fat-containing ventral hernia.    Follow-up colonoscopy due in 2018 [employer] system.  Patient notes he is current    Calcium 9.9, TSH not available    Impression: Chronic constipation for a long duration.  Current program senna based treatment with his Ducolax may be reasonable.  Sometimes established bowel programs self sort and may work out for the patient.  Uncertain whether Linzess, prucalopride, etc. would be better than he is currently using.    Plan: 1.  Sometimes the fine print on medications may indicate no long-term studies have been performed and this is also seen  with MiraLAX.  Senna is a reasonable long-term solution and has been used in a healthcare long time.  Previous concerns about nerve and animal studies have not been validated and senna is felt to be okay.  2 TSH for completeness if have not been checked in the last year, 3.  Follow-up colonoscopy as previous schedule  4.  Check with primary regarding neurological concerns but I cannot implicate phone  Numbers dailing confusion and the higher brain function as contributing to chronic constipation.    As needed return

## 2023-05-02 ENCOUNTER — TELEPHONE (OUTPATIENT)
Dept: VASCULAR SURGERY | Facility: CLINIC | Age: 68
End: 2023-05-02
Payer: COMMERCIAL

## 2023-05-02 NOTE — TELEPHONE ENCOUNTER
[facility] to schedule 1 year follow up with Deanna Norton or Hellen Ross with ultrasound and CT done a few days before.  [phone]

## 2023-07-06 ENCOUNTER — HOSPITAL ENCOUNTER (OUTPATIENT)
Dept: ULTRASOUND IMAGING | Facility: CLINIC | Age: 68
Discharge: HOME | End: 2023-07-06
Attending: SURGERY
Payer: COMMERCIAL

## 2023-07-06 ENCOUNTER — HOSPITAL ENCOUNTER (OUTPATIENT)
Dept: CT IMAGING | Facility: CLINIC | Age: 68
Discharge: HOME | End: 2023-07-06
Attending: SURGERY
Payer: COMMERCIAL

## 2023-07-06 DIAGNOSIS — I71.40 ABDOMINAL AORTIC ANEURYSM (AAA) WITHOUT RUPTURE (H): ICD-10-CM

## 2023-07-06 PROCEDURE — 93978 VASCULAR STUDY: CPT

## 2023-07-06 PROCEDURE — 74176 CT ABD & PELVIS W/O CONTRAST: CPT

## 2023-07-10 ENCOUNTER — VIRTUAL VISIT (OUTPATIENT)
Dept: VASCULAR SURGERY | Facility: CLINIC | Age: 68
End: 2023-07-10
Attending: SURGERY
Payer: COMMERCIAL

## 2023-07-10 DIAGNOSIS — I71.43 INFRARENAL ABDOMINAL AORTIC ANEURYSM (AAA) WITHOUT RUPTURE (H): Primary | ICD-10-CM

## 2023-07-10 PROCEDURE — 99215 OFFICE O/P EST HI 40 MIN: CPT | Mod: VID | Performed by: NURSE PRACTITIONER

## 2023-07-10 RX ORDER — ATORVASTATIN CALCIUM 20 MG/1
1 TABLET, FILM COATED ORAL DAILY
COMMUNITY
Start: 2023-04-17 | End: 2023-09-13

## 2023-07-10 RX ORDER — LOSARTAN POTASSIUM 100 MG/1
1 TABLET ORAL DAILY
COMMUNITY
Start: 2023-06-21

## 2023-07-10 NOTE — NURSING NOTE
Patient confirms medications and allergies are accurate via patients echeck in completion, and or denies any changes since last reviewed/verified.     Is the patient currently in the state of [place]? YES    Visit mode:VIDEO    If the visit is dropped, the patient can be reconnected by: VIDEO VISIT: Text to cell phone: [phone]    Will anyone else be joining the visit? Lalita - Daughter      How would you like to obtain your AVS? Mail a copy    Are changes needed to the allergy or medication list? NO    Reason for visit: RECHECK          Nahomy Álvarez, Virtual Facilitator

## 2023-07-10 NOTE — PATIENT INSTRUCTIONS
Thank you so much for choosing us for your care. It was a pleasure to see you at the vascular clinic today.     Follow-up recommendations: We will see you back in 12 months. Please do not hesitate to reach out to us in the meantime with any concerns. Our schedulers will get in touch with you to set up your follow-up visit.     Additional testing/imaging ordered today: None     Our scheduling team will get in touch with you to set up any follow-up testing/imaging and/or appointments. Please be aware that any testing/imaging recommended today will need to completed prior to your next visit with the provider. If testing/imaging is not completed prior to your next visit, your visit may be rescheduled.        If you have any questions, please contact our clinic directly at [phone] and ask for the nurse. We also encourage the use of [employer] to communicate with your HealthCare Provider.        If you have an urgent need after business hours (8:00 am to 4:30 pm) please call [phone], option 4, and ask for the vascular attending on call. For non-urgent after hours needs, please call the vascular clinic at [phone]. For scheduling needs, please call our clinic directly at [phone].

## 2023-07-10 NOTE — PROGRESS NOTES
[facility] V[facility]      Type of Visit: Virtual: Mariah    Patient is here for a return visit to discuss CT/US.     Vitals - Patient Reported  Weight (Patient Reported): 104.3 kg (230 lb)  Height (Patient Reported): 182.9 cm (6')  BMI (Based on Pt Reported Ht/Wt): 31.19  Pain Score: No Pain (1)    Questions patient would like addressed today are: Patient verbalized no questions/concerns, this has been communicated to the provider.     Refills are needed: No    How would you like to obtain your AVS? Hashablehart    Virtual Visit Details    Type of service:  Video Visit   Joined the call at 7/10/2023, 1:34:45 pm.  Left the call at 7/10/2023, 1:56:09 pm.  You were on the call for 21 minutes 23 seconds .    Originating Location (pt. Location): Home    Distant Location (provider location):  On-site  Platform used for Video Visit: Mariah

## 2023-07-10 NOTE — PROGRESS NOTES
VASCULAR SURGERY PROGRESS NOTE    LOCATION: [facility]     Jose Bailey  Medical Record #:  [id number]  YOB: 1955  Age:  67 year old     Date of Service: 7/10/2023    PRIMARY CARE PROVIDER: No Ref-Primary, Physician    Reason for visit: CT/US follow up 1 year s/p aortobiiliac stent grafting    IMPRESSION / RECOMMENDATION:   Jose Bailey is a 67-year-old gentleman who on December 12, 2021 underwent endovascular repair of abdominal aortic aneurysm with endograft.  He has been doing very well since then, longitudinally followed with CTs and ultrasound. His aortoiliac duplex from 7/6/2023 demonstrated patent aorto iliac stent graft, his CT abdomen/pelvis re-demonstrated the fusiform aneurysmal dilation of the infrarenal abdominal aorta measuring 4.5 x 5.8 cm in AP and transverse dimensions, previously 5.8 x 7.6 cm.  Patient continues to do well, more recently noted tenderness in one of his left testicle  for which an ultrasound is pending.  Otherwise patient notes that symptoms related to his AAA are confounded by his long-term Lyme disease complications.  He has abdominal and back pain at baseline which she has had for the last 30 years.  Continues to smoke 1 pack a day, compliant with aspirin and statin.  Recommend smoking cessation, continuing the aspirin and smoking cessation.  We will follow-up with Mr. Bailey in 1 year with repeat aortoiliac ultrasound and CT abdomen pelvis.    All questions were answered and support provided. He has our contact information and knows to reach out with any additional questions or concerns.     Belkys Miguel, [facility]  Vascular Surgery  Pager: [phone]  [email or web address]  Send message or 10 digit call back number Securely via [employer] with the Vocera Web Console (learn more here)        HPI:  Jose Bailey is a 67 year old male with past medical history significant for hypertension, chronic constipation for which requires stool softeners and  occasional cleanses, complications of Lyme disease contributing to his abdominal and back pain.  He presents to today's virtual clinic occasionally technically assisted by his daughter Lalita. Patient noted that his AAA was incidentally found a couple years ago while being worked up for high-grade small bowel obstruction in setting of a ventral abdominal hernia. Dr. Fontanez intervened in 2021 with Endovascular repair of abdominal aortic aneurysm with endograft, at that time his infrarenal abdominal aortic aneurysm measured 8.0 x 6.8 cm.  Patient notes that he has been doing well since his surgery, appears to be concerned with colon cancer, recommended that he sees a gastroenterologist for his concern.  Denies chills, fevers, however appears to have abdominal pain and nausea which per patient's statement are at baseline.  Patient has had Lyme disease for many years with ongoing complications of abdominal and back pain for the last 30 years.    REVIEW OF SYSTEMS:    A 12 point ROS was reviewed and is negative except for what is listed above in HPI.    PHH:    Past Medical History:   Diagnosis Date     AAA (abdominal aortic aneurysm) (H)      Abdominal aortic aneurysm (AAA) (H) 12/1/2021     THOMPSON (acute kidney injury) (H) 10/17/2021     Benign prostatic hyperplasia with urinary obstruction 6/10/2008     CKD (chronic kidney disease) stage 3, GFR 30-59 ml/min (H) 10/17/2021     Diverticulitis      Enlarging abdominal aortic aneurysm (AAA) (H) 10/16/2021     Essential hypertension 5/19/2008     Irritable bowel syndrome 7/17/2008     Obstructive sleep apnea syndrome 8/28/2008     Pain of right upper arm 12/1/2021     SBO (small bowel obstruction) (H) 10/16/2021     Tobacco use disorder 10/17/2021          Past Surgical History:   Procedure Laterality Date     COLECTOMY       CYSTOSCOPY       ESTABLISHMENT, VASCULAR ACCESS, FEM APPROACH, FOR ENDOVASC STENT INSERTION INTO ABD AORTIC ANEURYSM N/A 12/1/2021    Procedure:  Endovascular repair of abdominal aortic aneurysm with endograft using 2 docking limbs.  Large-bore access closure x2.;  Surgeon: Sheryl Fontanez MD;  Location: [facility] OR     HERNIORRHAPHY, INCISIONAL, ROBOT-ASSISTED, LAPAROSCOPIC, DA RAJEEV XI, W/TRANSVERSE ABDOMINIS RELEASE N/A 4/14/2022    Procedure: HERNIORRHAPHY, INCISIONAL, ROBOT-ASSISTED, LAPAROSCOPIC, USING DA RAJEEV XI BILATERAL TRANSVERSUS ABDOMINUS RELEASE; LYSIS OF ADHESIONS;  Surgeon: Farhan Alba DO;  Location: [facility] OR     IR ABDOMINAL AORTOGRAM  12/1/2021       ALLERGIES:  Patient has no known allergies.    MEDS:    Current Outpatient Medications:      aspirin (ASA) 81 MG EC tablet, Take 1 tablet (81 mg) by mouth daily, Disp: , Rfl:      atenolol (TENORMIN) 50 MG tablet, Take 1 tablet (50 mg) by mouth daily, Disp: 30 tablet, Rfl: 3     atorvastatin (LIPITOR) 20 MG tablet, Take 1 tablet by mouth daily, Disp: , Rfl:      ascorbic acid, vitamin C, (VITAMIN C) 1000 MG tablet, Take 2,000 mg by mouth daily  (Patient not taking: Reported on 7/10/2023), Disp: , Rfl:      b complex vitamins (B COMPLEX 1) tablet, Take 1 tablet by mouth daily  (Patient not taking: Reported on 7/10/2023), Disp: , Rfl:      Charcoal Activated (ACTIVATED CHARCOAL PO), Take 2 capsules by mouth daily (Patient not taking: Reported on 7/10/2023), Disp: , Rfl:      cholecalciferol (VITAMIN D3) 125 mcg (5000 units) capsule, Take 125 mcg by mouth daily (Patient not taking: Reported on 7/10/2023), Disp: , Rfl:      docusate sodium (STOOL SOFTENER) 100 MG capsule, Take 100 mg by mouth daily  (Patient not taking: Reported on 7/10/2023), Disp: , Rfl:      erythromycin base 500 MG TABS, , Disp: , Rfl:      fish oil-omega-3 fatty acids 1000 MG capsule, Take 4 g by mouth 2 times daily (Patient not taking: Reported on 7/10/2023), Disp: , Rfl:      losartan (COZAAR) 100 MG tablet, Take 1 tablet by mouth daily (Patient not taking: Reported on 7/10/2023), Disp: , Rfl:      Milk  Thistle 175 MG CAPS, Take 2 capsules by mouth daily (Patient not taking: Reported on 7/10/2023), Disp: , Rfl:      Misc Natural Products (URINOZINC PROSTATE PO), Take 1 capsule by mouth 2 times daily  (Patient not taking: Reported on 7/10/2023), Disp: , Rfl:      QUERCETIN PO, Take 1 tablet by mouth daily (Patient not taking: Reported on 7/10/2023), Disp: , Rfl:      saw palmetto fruit 450 mg cap, Take 900 mg by mouth daily  (Patient not taking: Reported on 7/10/2023), Disp: , Rfl:      UNABLE TO FIND, Vitamin: Special 2 (Patient not taking: Reported on 7/10/2023), Disp: , Rfl:      Zinc 100 MG TABS, Take 100 mg by mouth daily (Patient not taking: Reported on 7/10/2023), Disp: , Rfl:     SOCIAL HABITS:    History   Smoking Status     Every Day     Packs/day: 1.50     Years: 53.00     Types: Cigarettes     Start date: 1968   Smokeless Tobacco     Never     Comment: seen by TTS 10/18/2021     Social History    Substance and Sexual Activity      Alcohol use: Not Currently      History   Drug Use No       FAMILY HISTORY:    Family History   Problem Relation Age of Onset     Cerebrovascular Disease Mother      Aneurysm Mother      No Known Problems Father        PE:  There were no vitals taken for this visit.  Wt Readings from Last 1 Encounters:   08/02/22 105.7 kg (233 lb)     There is no height or weight on file to calculate BMI.    EXAM:  GENERAL: Healthy, alert and no distress  EYES: Eyes grossly normal to inspection.  No discharge or erythema, or obvious scleral/conjunctival abnormalities.  RESP: No audible wheeze, cough, or visible cyanosis.  No visible retractions or increased work of breathing.    SKIN: Visible skin clear. No significant rash, abnormal pigmentation or lesions.  NEURO: Cranial nerves grossly intact.  Mentation and speech appropriate for age.  PSYCH: Mentation appears normal, affect normal/bright, judgement and insight intact, normal speech and appearance well-groomed.      DIAGNOSTIC STUDIES:      US Aorta/Ivc/Iliac Duplex Complete    Result Date: 7/6/2023  ULTRASOUND AORTA/IVC/ILIAC DUPLEX COMPLETE 7/6/2023 9:41 AM CLINICAL HISTORY: Abdominal aortic aneurysm (AAA) without rupture (H). TECHNIQUE: Transverse and longitudinal images of the aorta. Color flow and spectral Doppler with waveform analysis. COMPARISON: 7/6/2023, 7/7/2022, 5/25/2017, 11/10/2016. FINDINGS: Patient is status post aortobiiliac stent grafting. There is a persistent fusiform infrarenal abdominal aortic aneurysm present. Evaluation is limited by bowel gas. The stent graft appears patent without note of elevated velocities. There is moderate to severe atheromatous plaque in the abdominal aorta. MEASUREMENTS: Supraceliac: 3.5 x 3.8 cm. Infrarenal/proximal: 2.1 x 2.7 cm. Widest dimension: 3.8 x 5.8 cm. Distal aorta: 3.1 x 2.9 cm. Right Common Iliac Artery: 3.1 x 2.9 cm. Left Common Iliac Artery: 1.6 x 1.9 cm. Right external iliac artery: 1.1 cm. Left external iliac artery: 1.1 cm.     IMPRESSION: 1.  Patent aortobiiliac stent graft. 2.  Fusiform aneurysmal dilation of the infrarenal abdominal aorta. MELY KERR MD   SYSTEM ID:  [id number]    CT Abdomen Pelvis w/o Contrast    Result Date: 7/6/2023  CT ABDOMEN AND PELVIS WITHOUT CONTRAST 7/6/2023 8:44 AM CLINICAL HISTORY: Abdominal aortic aneurysm (AAA) without rupture (H). TECHNIQUE: CT scan of the abdomen and pelvis was performed without IV contrast. Multiplanar reformats were obtained. Dose reduction techniques were used. CONTRAST: None. COMPARISON: 7/7/2022, 1/6/2022, 10/18/2021. FINDINGS: LOWER CHEST: Heart size is mildly enlarged. Coarse calcifications of the mitral annulus are noted. HEPATOBILIARY: Calcified gallstones are seen layering in the gallbladder lumen. No gallbladder wall thickening or pericholecystic fluid. No bile duct dilation. Unenhanced images of the liver parenchyma appear unremarkable. Liver contour is smooth. PANCREAS: Normal. SPLEEN: Stable in appearance.  ADRENAL GLANDS: Low-density masslike nodularity of the adrenal glands is stable in appearance and compatible with benign adenomatous change. No follow-up is necessary. KIDNEYS/BLADDER: A subtle right renal cortical cyst is noted, which does not require follow-up. Tiny cysts on the left are also similar in comparison and do not require follow-up. No nephrolithiasis or hydronephrosis. There is mild similar-appearing urinary bladder wall thickening, likely secondary to hypertrophy. BOWEL: Postsurgical change at the rectosigmoid junction is noted. Colonic diverticulosis is present without findings of diverticulitis. Large and small bowel loops are nonobstructed and noninflamed. No evidence of acute appendicitis. LYMPH NODES: Normal. VASCULATURE: There is fusiform aneurysmal dilation of the infrarenal abdominal aorta measuring 4.5 x 5.8 cm in AP and transverse dimensions, respectively (5-98), previously 5.8 x 7.6 cm. An aortobiiliac stent graft is present. There is similar-appearing aneurysmal dilation of the right common iliac artery measuring up to 2.8 cm in diameter, previously 2.7 cm. Severe atherosclerosis of the imaged aorta and branch vessels is seen. Evaluation for endoleak is not possible on this unenhanced study. PELVIC ORGANS: Normal. OTHER: None. MUSCULOSKELETAL: A similar appearing fat-containing noninflamed supraumbilical ventral abdominal wall hernia is again noted. Multilevel hypertrophic and degenerative changes of the spine are present. No acute osseous abnormality.     IMPRESSION: 1.  Continued interval decrease in size of the excluded sac of the previously described fusiform aneurysmal dilation of the infrarenal abdominal aorta, status post aortobiiliac stent grafting. 2.  No acute intra-abdominal or intrapelvic process. 3.  Nonacute findings as above. MELY KERR MD   SYSTEM ID:  [id number]    LABS:      Sodium   Date Value Ref Range Status   04/15/2022 132 (L) 136 - 145 mmol/L Final   04/14/2022  134 (L) 136 - 145 mmol/L Final   12/02/2021 137 136 - 145 mmol/L Final     Urea Nitrogen   Date Value Ref Range Status   04/15/2022 28 (H) 8 - 22 mg/dL Final   04/14/2022 30 (H) 8 - 22 mg/dL Final   12/02/2021 22 8 - 22 mg/dL Final     Hemoglobin   Date Value Ref Range Status   12/02/2021 11.9 (L) 13.3 - 17.7 g/dL Final   12/01/2021 14.1 13.3 - 17.7 g/dL Final   10/18/2021 13.2 (L) 13.3 - 17.7 g/dL Final     Platelet Count   Date Value Ref Range Status   12/02/2021 249 150 - 450 10e3/uL Final   12/01/2021 280 150 - 450 10e3/uL Final   12/01/2021 317 150 - 450 10e3/uL Final     INR   Date Value Ref Range Status   12/01/2021 0.97 0.90 - 1.15 Final       45 minutes spent on the day of encounter doing chart review, history and exam, documentation, and further activities as noted.

## 2024-01-17 ENCOUNTER — TELEPHONE (OUTPATIENT)
Dept: VASCULAR SURGERY | Facility: CLINIC | Age: 69
End: 2024-01-17
Payer: COMMERCIAL

## 2024-01-19 ENCOUNTER — TELEPHONE (OUTPATIENT)
Dept: VASCULAR SURGERY | Facility: CLINIC | Age: 69
End: 2024-01-19
Payer: COMMERCIAL

## 2024-01-19 NOTE — TELEPHONE ENCOUNTER
1/19 HORACE and Nicolasa for patient to schedule ultrasound and CTA in July with virtual visit Belkys Miguel appointment afterwards

## 2024-01-28 NOTE — PATIENT INSTRUCTIONS
28-Jan-2024 11:41 Other current medical plan seems reasonable with the colon cleanse with senna and the use of Ducolax.    I would double check that you have had a thyroid blood test within the last year if not this could be ordered.    Linzess may be a future option but I am not sure this would work better than what you are currently on.    Check with your primary regarding neurological concerns.    As needed return

## 2024-05-20 ENCOUNTER — TELEPHONE (OUTPATIENT)
Dept: VASCULAR SURGERY | Facility: CLINIC | Age: 69
End: 2024-05-20
Payer: COMMERCIAL

## 2024-05-20 NOTE — TELEPHONE ENCOUNTER
Left Voicemail (1st Attempt) for the patient to call back and schedule the following:Reschedule 7/15 video visit     [place] [facility]  Provider: Belkys Miguel  Appointment type: Return Vas Pt   Appointment mode Vide Visit  Return date: 7/15/24    Specialty phone number: [phone] or  [phone]      Is Imaging Needed:No  Imaging Phone Number to provide to patient: No    Additional Notes:  5/20 LVM PROV WOULD LIKE PT TO SCHED LATER SAME SAY OR DIFFERENT DATE.   Eddie Gray on 5/20/2024 at 6:53 PM

## 2024-07-11 ENCOUNTER — HOSPITAL ENCOUNTER (OUTPATIENT)
Dept: ULTRASOUND IMAGING | Facility: CLINIC | Age: 69
Discharge: HOME | End: 2024-07-11
Attending: NURSE PRACTITIONER
Payer: COMMERCIAL

## 2024-07-11 ENCOUNTER — HOSPITAL ENCOUNTER (OUTPATIENT)
Dept: CT IMAGING | Facility: CLINIC | Age: 69
Discharge: HOME | End: 2024-07-11
Attending: NURSE PRACTITIONER
Payer: COMMERCIAL

## 2024-07-11 DIAGNOSIS — I71.43 INFRARENAL ABDOMINAL AORTIC ANEURYSM (AAA) WITHOUT RUPTURE (H): ICD-10-CM

## 2024-07-11 PROCEDURE — 74176 CT ABD & PELVIS W/O CONTRAST: CPT

## 2024-07-11 PROCEDURE — 93978 VASCULAR STUDY: CPT

## 2024-09-10 ENCOUNTER — TELEPHONE (OUTPATIENT)
Dept: GASTROENTEROLOGY | Facility: CLINIC | Age: 69
End: 2024-09-10
Payer: COMMERCIAL

## 2024-09-10 NOTE — TELEPHONE ENCOUNTER
[facility] Call Center    Phone Message    May a detailed message be left on voicemail: yes     Reason for Call: Symptoms or Concerns     If patient has red-flag symptoms, warm transfer to triage line    Current symptom or concern: Pt is thinking he has parasites and is having pains in his stomach. He has mucus stools with weird bowel movements with blood.      Symptoms have been present for:  2 day(s)    Has patient previously been seen for this? No    By: Dr. Ignacio    Date: 9-8-24    Are there any new or worsening symptoms? No    Action Taken: Other: [facility] GI    Travel Screening: Not Applicable     Date of Service:

## 2024-09-10 NOTE — TELEPHONE ENCOUNTER
Call back to patient who reports that he had parasites back in March and he took ivermectin. He states that after that he had very weird bowel movements where he had wormlike things in his stool.    Sunday am had a normal BM and then later that day he had a BM with a bunch of mucous in his stool. He reports that he had what looked like a ball of stuff that appeared to be a worm. On Sunday he got a pain in the left side of his stomach. Monday he reports that he had a BM with green ribbon and blood.    Currently he is off of his Lyme's protocol meds for the 2 weeks off, Sunday he will start his medications again and he wants to start ivermectin again for a few weeks and then take a week off and start the fembendazole.    Patient has made an appointment with Dr. Ignacio to follow up on these concerns. Writer will forward this information on to provider and call patient with any recommendations.    Glenys Hook RN

## 2024-09-11 NOTE — TELEPHONE ENCOUNTER
Tam Ignacio MD Heckt, Glenys, RN  Caller: Unspecified (Yesterday, 12:44 PM)  Parasites are uncommon.  The PCP can order a O&P to guide care, but if negative, that is reassuring. Can chat more in Oct.      Call to patient to update on the above from provider. Patient verbalized understanding.    Glenys Hook RN

## 2024-10-14 ENCOUNTER — TELEPHONE (OUTPATIENT)
Dept: GASTROENTEROLOGY | Facility: CLINIC | Age: 69
End: 2024-10-14
Payer: COMMERCIAL

## 2024-10-14 NOTE — TELEPHONE ENCOUNTER
[place] [facility]    Phone Message    May a detailed message be left on voicemail: yes     Reason for Call: Other: Jose is calling in asking to get a message to his care team. Pt states that he has a picture of the parasites that he had coming out of his body and would like to send it to his care team prior to his appt. Writer suggested he sign up for [employer], but Pt states his daughter, Lalita, handles anything computer related for him and requests that his care team contacts her for assistance in what can be done to send this picture over. He supplied a phone number of [phone]. Please respond accordingly.     Action Taken: Message routed to:  Other: [facility]    Travel Screening: Not Applicable     Date of Service:

## 2024-10-15 NOTE — TELEPHONE ENCOUNTER
Subjective:     Encounter Date:01/23/2019      Patient ID: Nickolas Drake is a 82 y.o. male.    Chief Complaint:  History of Present Illness    This is an 82-year-old with a history of hypertension, hyperlipidemia, tobacco use, coronary artery disease status post prior anterior lateral ST elevation MI and drug eluting stent placement of his mid left anterior descending artery, who presents for follow-up.     I began following the patient when he presented in 12/2013 with an anterolateral ST elevation MI.  His cardiac catheterizations time showed a significant mid LAD thrombotic stenosis of 95% for which he underwent drug-eluting stent placement.  Initially his echocardiogram showed septal wall hypokinesis but preserved left ventricular systolic function.  A repeat echocardiogram and follow-up showed resolution of his wall motion abnormalities and continued normal left ventricular systolic function.  He remained on Ticagrelor approximately one year following his stent placement.  He has done well from a cardiac standpoint since then.     Today he presents for routine one year follow-up.  He has no significant complaints.  He denies any chest pain, shortness of breath, PND or orthopnea, near-syncope or syncope, palpitations, or lower extremity edema.  He continues to smoke about 4-5 cigarettes a day.  The patient did not express any interest in quitting in the past.  He denies any changes in his medications.    Review of Systems   Constitution: Positive for malaise/fatigue. Negative for weakness.   HENT: Negative for hearing loss, hoarse voice, nosebleeds and sore throat.    Eyes: Negative for pain.   Cardiovascular: Negative for chest pain, claudication, cyanosis, dyspnea on exertion, irregular heartbeat, leg swelling, near-syncope, orthopnea, palpitations, paroxysmal nocturnal dyspnea and syncope.   Respiratory: Negative for shortness of breath and snoring.    Endocrine: Negative for cold intolerance, heat  Spoke with patient's Daughter Lalita who was given instructions on how to set up patient's [employer] account. Lalita stated she will be with patient later today to help him activate his account. Provided phone number to call with any questions.   Nano Fajardo RN on 10/15/2024 at 11:14 AM     intolerance, polydipsia, polyphagia and polyuria.   Skin: Negative for itching and rash.   Musculoskeletal: Negative for arthritis, falls, joint pain, joint swelling, muscle cramps, muscle weakness and myalgias.   Gastrointestinal: Negative for constipation, diarrhea, dysphagia, heartburn, hematemesis, hematochezia, melena, nausea and vomiting.   Genitourinary: Negative for frequency, hematuria and hesitancy.   Neurological: Positive for light-headedness. Negative for excessive daytime sleepiness, dizziness, headaches and numbness.   Psychiatric/Behavioral: Negative for depression. The patient is not nervous/anxious.           Current Outpatient Medications:   •  aspirin 81 MG tablet, Take 1 tablet by mouth daily., Disp: , Rfl:   •  atorvastatin (LIPITOR) 20 MG tablet, Take 1 tablet by mouth daily., Disp: , Rfl:   •  metoprolol tartrate (LOPRESSOR) 25 MG tablet, Take 1 tablet by mouth 2 (two) times a day., Disp: , Rfl:   •  Omega-3 Fatty Acids (FISH OIL) 1000 MG capsule capsule, Take 1 capsule by mouth daily., Disp: , Rfl:     Past Medical History:   Diagnosis Date   • Atherosclerotic heart disease     s/p anterior ST elevation MI 12/12/2013: mid LAD Xience xpedition 2.5 x 23, 2.5 x 12 mm stent. Proximal LAD 20% stenosis. Mid LCx 20-30% stenosis. Mid RCA 20% stenosis.   • CAD (coronary artery disease)    • Dyslipidemia    • Health care maintenance    • Hyperlipidemia    • Hypertension    • Ischemic cardiomyopathy    • ST elevation (STEMI) myocardial infarction (CMS/HCC)      Past Surgical History:   Procedure Laterality Date   • CARDIAC CATHETERIZATION Left 12/12/2013    [facility], selective coronary angiogram, PCI stent of mid LAD, left ventricular angiogram, Dr. Janet Snowden     Family History   Problem Relation Age of Onset   • No Known Problems Mother    • No Known Problems Father      Social History     Tobacco Use   • Smoking status: Current Every Day Smoker   • Smokeless tobacco: Never Used  "  • Tobacco comment: 4 or less cig/day   Substance Use Topics   • Alcohol use: No     Frequency: Never     Comment: rare  / caffeine use   • Drug use: No           ECG 12 Lead  Date/Time: 1/23/2019 4:25 PM  Performed by: Janet Snowden MD  Authorized by: Janet Snowden MD   Comparison: compared with previous ECG   Comparison to previous ECG: PACs are new  Rhythm: sinus rhythm  Ectopy: atrial premature contractions  Q waves: III and aVF                 Objective:         Visit Vitals  /78 (BP Location: Right arm)   Pulse 67   Ht 180.3 cm (71\")   Wt 86.6 kg (191 lb)   BMI 26.64 kg/m²          Physical Exam   Constitutional: He is oriented to person, place, and time. He appears well-developed and well-nourished.   HENT:   Head: Normocephalic and atraumatic.   Neck: No JVD present. Carotid bruit is not present.   Cardiovascular: Normal rate, regular rhythm, S1 normal and S2 normal. Exam reveals no gallop.   No murmur heard.  Pulses:       Radial pulses are 2+ on the right side, and 2+ on the left side.   No bilateral lower extremity edema   Pulmonary/Chest: Effort normal and breath sounds normal.   Abdominal: Soft. Normal appearance.   Neurological: He is alert and oriented to person, place, and time.   Skin: Skin is warm, dry and intact.   Psychiatric: He has a normal mood and affect.       Lab Review:       Assessment:          Diagnosis Plan   1. Coronary artery disease involving native coronary artery of native heart without angina pectoris     2. S/P drug eluting coronary stent placement     3. Essential hypertension     4. Dyslipidemia     5. Tobacco use            Plan:       1.  Coronary artery disease.  History of anterior lateral myocardial infarction and the buttocks and placement of his mid LAD.  He continues to do well from this standpoint.  Continue current medical management aspirin, statin, beta blocker.  2.  Hypertension.  Well-controlled on his current medications.  3.  Hyperlipidemia.  On " atorvastatin which is managed by Dr. Leija.  4.  Tobacco use.  Patient does not express any interest in quitting in the past.    We'll plan on seeing the patient back again in one year or sooner if further issues arise.    Coronary Artery Disease  Assessment  • The patient has no angina    Plan  • Lifestyle modifications discussed include adhering to a heart healthy diet, avoidance of tobacco products, maintenance of a healthy weight, medication compliance, regular exercise and regular monitoring of cholesterol and blood pressure    Subjective - Objective  • There is a history of past MI 12/2013  • There has been a previous stent procedure using DAYSI 12/2013  • Current antiplatelet therapy includes aspirin 81 mg

## 2024-10-15 NOTE — TELEPHONE ENCOUNTER
Attempted to call patient and patient's daughter Lalita (per Patient's request), no answer. Left voicemail and requested patient call back at [phone]. Please see message below. Patient needs assistance setting up Mychart.     Nano Fajardo RN   [facility]

## 2024-10-28 ENCOUNTER — OFFICE VISIT (OUTPATIENT)
Dept: GASTROENTEROLOGY | Facility: CLINIC | Age: 69
End: 2024-10-28
Payer: COMMERCIAL

## 2024-10-28 VITALS
TEMPERATURE: 96.9 F | SYSTOLIC BLOOD PRESSURE: 136 MMHG | HEART RATE: 67 BPM | OXYGEN SATURATION: 95 % | BODY MASS INDEX: 31.87 KG/M2 | WEIGHT: 235 LBS | DIASTOLIC BLOOD PRESSURE: 82 MMHG

## 2024-10-28 DIAGNOSIS — Z87.19 HX OF CONSTIPATION: ICD-10-CM

## 2024-10-28 DIAGNOSIS — Z98.890 HISTORY OF COLONOSCOPY: Primary | ICD-10-CM

## 2024-10-28 DIAGNOSIS — K59.00 CONSTIPATION, UNSPECIFIED CONSTIPATION TYPE: ICD-10-CM

## 2024-10-28 LAB
CA-I BLD-MCNC: 4.9 MG/DL (ref 4.4–5.2)
CREAT SERPL-MCNC: 2.08 MG/DL (ref 0.67–1.17)
EGFRCR SERPLBLD CKD-EPI 2021: 33 ML/MIN/1.73M2
TSH SERPL DL<=0.005 MIU/L-ACNC: 0.74 UIU/ML (ref 0.3–4.2)

## 2024-10-28 PROCEDURE — 36415 COLL VENOUS BLD VENIPUNCTURE: CPT | Performed by: INTERNAL MEDICINE

## 2024-10-28 PROCEDURE — 82330 ASSAY OF CALCIUM: CPT | Performed by: INTERNAL MEDICINE

## 2024-10-28 PROCEDURE — 99213 OFFICE O/P EST LOW 20 MIN: CPT | Performed by: INTERNAL MEDICINE

## 2024-10-28 PROCEDURE — 82565 ASSAY OF CREATININE: CPT | Performed by: INTERNAL MEDICINE

## 2024-10-28 RX ORDER — CIPROFLOXACIN 500 MG/1
TABLET, FILM COATED ORAL
COMMUNITY
Start: 2024-10-14

## 2024-10-28 RX ORDER — ATORVASTATIN CALCIUM 20 MG/1
1 TABLET, FILM COATED ORAL DAILY
COMMUNITY
Start: 2024-08-24

## 2024-10-28 RX ORDER — SENNOSIDES 8.6 MG
2 TABLET ORAL DAILY
Qty: 180 TABLET | Refills: 2 | Status: SHIPPED | OUTPATIENT
Start: 2024-10-28 | End: 2025-07-25

## 2024-10-28 RX ORDER — AMLODIPINE BESYLATE 10 MG/1
1 TABLET ORAL DAILY
COMMUNITY
Start: 2024-08-26

## 2024-10-28 ASSESSMENT — PAIN SCALES - GENERAL: PAINLEVEL_OUTOF10: NO PAIN (0)

## 2024-10-28 NOTE — LETTER
October 28, 2024      Jose Bailey  [address] [place] [address]        Dear ,    We are writing to inform you of your test results.    The kidney function has worsened over the last two years.  It would be important to follow up with a kidney doc or your primary as this is a significant change from before.    Resulted Orders   TSH   Result Value Ref Range    TSH 0.74 0.30 - 4.20 uIU/mL   Ionized Calcium   Result Value Ref Range    Calcium Ionized Whole Blood 4.9 4.4 - 5.2 mg/dL   Creatinine   Result Value Ref Range    Creatinine 2.08 (H) 0.67 - 1.17 mg/dL    GFR Estimate 33 (L) >60 mL/min/1.73m2      Comment:      eGFR calculated using 2021 CKD-EPI equation.       If you have any questions or concerns, please call the clinic at the number listed above.       Sincerely,      Tam Ignacio MD

## 2024-10-28 NOTE — LETTER
"10/28/2024      Jose Bailey  [address] [place] [address]      Dear Colleague,    Thank you for referring your patient, Jose Bailey, to the [facility]. Please see a copy of my visit note below.    Gastroenterology return    Last visit August 2022.  At that time patient was on a super colon cleanse that contains senna and would use Dulcolax intermittently.  Since then he found bowel movements less effective over the last year he noticed \"weird stuff\" in the colon he listen to a podcast that suggested he could have parasites.  On his own he placed himself on    Agri-Mectin and debris return what he felt were \"worms \"as well as shedding the mucosal lining of the colon.  There was a round object about the size of his fingertip with a hole in the sock inside which was black.  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  Nothing is crawling or alive he ordered fenbendazole from [employer] and was taking this makes him feel better there is less abdominal distress although he saw some brown sticky material.  He is concerned about his surgical message with lower scar tissue from years ago whether this could cause pains pains are intermittent and variable right upper quadrant left upper quadrant suprapubic.  He took milk of magnesia last week and had 3 good bowel movements daily the last 1 was Friday but no bowel movements over the weekend.  He was tested for [place] parasites x 3 that was negative.  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  He does recall having a pelvic floor testing he is concerned about inability to pass gas.  And is worried that when he becomes more gaseous and distended this affects his breathing.  He is interested in treatment that is natural his " use can allow but this only worked for a while and I suggested 2 kiwi fruit a day may be helpful there are studies that support that.  He is concerned that he had asparagus the other day and this did not breakdown completely there was debris in his stools.    Past medical history reviewed on epic medications reviewed on epic allergies reviewed on [facility].  Review of systems otherwise negative noncontributory    Exam was performed.  Normal patient left to use the bathroom during the visit.    Impression: Normal colonoscopy other than several adenomas.  No parasites were seen.  It is not reasonable to expect test celery or asparagus or so to clean his colon like a brush.  Scrubbing affect is not felt to be significant this is just insoluble fiber.  He [employer] programs other options remain.  Patient is interested in milk of magnesia on demand.  At this point we will check TSH calcium and creatinine for completeness.  Senna prescribed to tablets daily for 2 twice daily its uncertain if this does how it compares to his super colon cleanser.  Other options may include magnesium oxide tablets.  Linzess Amitiza are all other options as well I am uncertain whether this will be covered by insurance.  Reassurance regarding parasites.  Would not pursue ivermectin etc. on an ongoing basis because of safety.  Return to clinic in several months discussed    Again, thank you for allowing me to participate in the care of your patient.        Sincerely,        Tam Ignacio MD

## 2024-10-28 NOTE — NURSING NOTE
Chief Complaint   Patient presents with    Follow Up     Constipation, unspecified constipation type  Other constipation

## 2024-10-28 NOTE — PATIENT INSTRUCTIONS
Discussed    1.  Will check some labs for completeness today    2.  Prescription for senna 2 tablets once a day if not effective enough 2 tablets twice a day may be stronger    3.  Milk of magnesia as needed is an option.  However magnesium oxide tablets these can be taken for a dose of 800 mg daily this may help the bowels as well    4.  Prescription medicines are consideration but may or may not be covered by insurance    Follow-up as planned

## 2024-10-28 NOTE — LETTER
October 28, 2024      Jose JULIETH Leo  [address] [place] [address]        Dear ,    We are writing to inform you of your test results.    Your test results fall within the expected range(s) or remain unchanged from previous results.  Please continue with current treatment plan.    Resulted Orders   TSH   Result Value Ref Range    TSH 0.74 0.30 - 4.20 uIU/mL   Ionized Calcium   Result Value Ref Range    Calcium Ionized Whole Blood 4.9 4.4 - 5.2 mg/dL       If you have any questions or concerns, please call the clinic at the number listed above.       Sincerely,      Tam Ignacio MD

## 2024-10-28 NOTE — LETTER
October 28, 2024      Jose Bailey  [address] [place] [address]        Dear ,    We are writing to inform you of your test results.    The senna is a better choice than milk of magnesia or tablets of Mg.    Primary should monitor the change in kidney function as it has worsened.    Resulted Orders   TSH   Result Value Ref Range    TSH 0.74 0.30 - 4.20 uIU/mL   Ionized Calcium   Result Value Ref Range    Calcium Ionized Whole Blood 4.9 4.4 - 5.2 mg/dL   Creatinine   Result Value Ref Range    Creatinine 2.08 (H) 0.67 - 1.17 mg/dL    GFR Estimate 33 (L) >60 mL/min/1.73m2      Comment:      eGFR calculated using 2021 CKD-EPI equation.       If you have any questions or concerns, please call the clinic at the number listed above.       Sincerely,      Tam Ignacio MD

## 2024-10-28 NOTE — PROGRESS NOTES
"Gastroenterology return    Last visit August 2022.  At that time patient was on a super colon cleanse that contains senna and would use Dulcolax intermittently.  Since then he found bowel movements less effective over the last year he noticed \"weird stuff\" in the colon he listen to a podcast that suggested he could have parasites.  On his own he placed himself on    Agri-Mectin and debris return what he felt were \"worms \"as well as shedding the mucosal lining of the colon.  There was a round object about the size of his fingertip with a hole in the sock inside which was black.  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  Nothing is crawling or alive he ordered fenbendazole from [employer] and was taking this makes him feel better there is less abdominal distress although he saw some brown sticky material.  He is concerned about his surgical message with lower scar tissue from years ago whether this could cause pains pains are intermittent and variable right upper quadrant left upper quadrant suprapubic.  He took milk of magnesia last week and had 3 good bowel movements daily the last 1 was Friday but no bowel movements over the weekend.  He was tested for [place] parasites x 3 that was negative.  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  He does recall having a pelvic floor testing he is concerned about inability to pass gas.  And is worried that when he becomes more gaseous and distended this affects his breathing.  He is interested in treatment that is natural his use can allow but this only worked for a while and I suggested 2 kiwi fruit a day may be helpful there are studies that support that.  He is concerned that he had asparagus the other day and this did not breakdown completely there " was debris in his stools.    Past medical history reviewed on epic medications reviewed on epic allergies reviewed on epic.  Review of systems otherwise negative noncontributory    Exam was performed.  Normal patient left to use the bathroom during the visit.    Impression: Normal colonoscopy other than several adenomas.  No parasites were seen.  It is not reasonable to expect test celery or asparagus or so to clean his colon like a brush.  Scrubbing affect is not felt to be significant this is just insoluble fiber.  He [employer] programs other options remain.  Patient is interested in milk of magnesia on demand.  At this point we will check TSH calcium and creatinine for completeness.  Senna prescribed to tablets daily for 2 twice daily its uncertain if this does how it compares to his super colon cleanser.  Other options may include magnesium oxide tablets.  Linzess Amitiza are all other options as well I am uncertain whether this will be covered by insurance.  Reassurance regarding parasites.  Would not pursue ivermectin etc. on an ongoing basis because of safety.  Return to clinic in several months discussed

## 2024-11-04 ENCOUNTER — MEDICAL CORRESPONDENCE (OUTPATIENT)
Dept: HEALTH INFORMATION MANAGEMENT | Facility: CLINIC | Age: 69
End: 2024-11-04
Payer: COMMERCIAL

## 2024-11-07 ENCOUNTER — TELEPHONE (OUTPATIENT)
Dept: OTOLARYNGOLOGY | Facility: CLINIC | Age: 69
End: 2024-11-07
Payer: COMMERCIAL

## 2024-11-07 NOTE — TELEPHONE ENCOUNTER
[facility]    Phone Message    May a detailed message be left on voicemail: yes     Reason for Call: Other: Per protocols please review urgent (3-5 days) referral Salivary gland neoplasm.  Please contact daughter Lalita for scheduling.  Phone # [phone].  [facility] [place] thanks     Action Taken: Other: ENT    Travel Screening: Not Applicable     Date of Service:

## 2024-11-07 NOTE — TELEPHONE ENCOUNTER
FUTURE VISIT INFORMATION      FUTURE VISIT INFORMATION:  Date: 11/22/24  Time: 1:20 PM  Location: [facility] - ENT  REFERRAL INFORMATION:  Referring provider:  Dominag Cruz MD   Referring providers clinic:  [facility]   Reason for visit/diagnosis:  Salivary gland neoplasm - Referred by Dominga Cruz MD     RECORDS REQUESTED FROM      Clinic name Comments Records Status Imaging Status   Critical [facility] hospital  11/4/24 referral/ [facility]- Dominga Cruz MD  CE    [facility] 10/18/23 US FNA BIOPSY   9/14/23 CT HEAD BRAIN CE Req 11/7/24  PACS   [facility]  Path # [phone] opt 3  Fax [phone] PATH FNA CYTOLOGY ASP CYTOLOGY   10/11/2023 Case: [id number]     Final Diagnosis A) PAROTID GLAND, RIGHT, ULTRASOUND-GUIDED FINE-NEEDLE ASPIRATION:  1.  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  Scant cellularity  3.  See comment     Track: [id number] CE    Slides req 11/8/24    Slides received 11/11/24 11/07/2024 1:27 PM - Req image(s) at [facility] to be pushed to [facility]  **images resolved in PACS.  11/08/2024 6:16 AM: Req for path slides from [facility]    Pathology Slides Received 11/11/2024 11:54 AM  [id number]    Action 9 slides received from [facility] for 10/11/2023 Case: [id number]   . Sent to 5th floor pathology lab with consult form.

## 2024-11-07 NOTE — TELEPHONE ENCOUNTER
Patient confirmed scheduled appointment:  Date: 11/22  Time: 120  Provider: Ernesto  Location: [facility]   Testing/imaging:   Additional notes:

## 2024-11-12 ENCOUNTER — LAB REQUISITION (OUTPATIENT)
Dept: LAB | Facility: CLINIC | Age: 69
End: 2024-11-12
Payer: COMMERCIAL

## 2024-11-12 LAB
PATH REPORT.COMMENTS IMP SPEC: NORMAL
PATH REPORT.FINAL DX SPEC: NORMAL
PATH REPORT.GROSS SPEC: NORMAL
PATH REPORT.MICROSCOPIC SPEC OTHER STN: NORMAL
PATH REPORT.RELEVANT HX SPEC: NORMAL
PATH REPORT.RELEVANT HX SPEC: NORMAL
PATH REPORT.SITE OF ORIGIN SPEC: NORMAL

## 2024-11-12 PROCEDURE — 88321 CONSLTJ&REPRT SLD PREP ELSWR: CPT | Mod: GC | Performed by: PATHOLOGY

## 2024-11-20 DIAGNOSIS — K11.8 MASS OF SALIVARY GLAND: Primary | ICD-10-CM

## 2024-11-20 NOTE — PROGRESS NOTES
Called patient and daughter to discuss CT neck before appointment with Dr. Orozco. Scheduled scan. Confirmed appointment details with patient and daughter. Verbalized understanding and will follow up as scheduled.    Aby MELLON, RN

## 2024-11-22 ENCOUNTER — PRE VISIT (OUTPATIENT)
Dept: OTOLARYNGOLOGY | Facility: CLINIC | Age: 69
End: 2024-11-22

## 2024-11-22 ENCOUNTER — ANCILLARY PROCEDURE (OUTPATIENT)
Dept: CT IMAGING | Facility: CLINIC | Age: 69
End: 2024-11-22
Attending: OTOLARYNGOLOGY
Payer: COMMERCIAL

## 2024-11-22 DIAGNOSIS — K11.8 MASS OF SALIVARY GLAND: ICD-10-CM

## 2024-11-22 LAB
CREAT BLD-MCNC: 2 MG/DL (ref 0.7–1.3)
EGFRCR SERPLBLD CKD-EPI 2021: 35 ML/MIN/1.73M2

## 2024-11-22 PROCEDURE — 70491 CT SOFT TISSUE NECK W/DYE: CPT | Performed by: STUDENT IN AN ORGANIZED HEALTH CARE EDUCATION/TRAINING PROGRAM

## 2024-11-22 PROCEDURE — 82565 ASSAY OF CREATININE: CPT | Performed by: PATHOLOGY

## 2024-11-22 RX ORDER — IOPAMIDOL 755 MG/ML
90 INJECTION, SOLUTION INTRAVASCULAR ONCE
Status: COMPLETED | OUTPATIENT
Start: 2024-11-22 | End: 2024-11-22

## 2024-11-22 RX ADMIN — IOPAMIDOL 90 ML: 755 INJECTION, SOLUTION INTRAVASCULAR at 12:25

## 2024-11-22 NOTE — DISCHARGE INSTRUCTIONS

## (undated) DEVICE — SYR 10ML LL W/O NDL 302995

## (undated) DEVICE — SHEATH INTRODUCER DRYSEAL FLEX W/HYDRO CT 18FRX33CM DSF1833

## (undated) DEVICE — CATH TRAY FOLEY 16FR W/METER A800365

## (undated) DEVICE — SPONGE RAY-TEC 4X8" 7318

## (undated) DEVICE — NDL PERC ENTRY 19GA 7CM G04876

## (undated) DEVICE — SU VICRYL+ 3-0 27IN SH UND VCP416H

## (undated) DEVICE — Device

## (undated) DEVICE — SET NEURO SYRINGE/FLUID  K10-01039C

## (undated) DEVICE — SUTURE VICRYL+ 2-0 27IN SH UND VCP417H

## (undated) DEVICE — GOWN XLG DISP 9545

## (undated) DEVICE — GLOVE BIOGEL PI ULTRATOUCH G SZ 6.5 42165

## (undated) DEVICE — CUSTOM PACK LAP CHOLE SBA5BLCHEA

## (undated) DEVICE — SYR ANGIO CONTRAST  150-FT-Q

## (undated) DEVICE — DRAPE STERI FLUOROSCOPE 35X43"1012 LATEX FREE

## (undated) DEVICE — SUTURE BOOTS 051003PBX

## (undated) DEVICE — CLOSURE DEVICE 6FR VASC PROGLIDE MEDICATED SUTURE 12673-03

## (undated) DEVICE — CATH OMNIFLUSH 5FRX70CM SIZING 13709702

## (undated) DEVICE — DAVINCI XI DRAPE ARM 470015

## (undated) DEVICE — SPONGE LAP 18X18" X8435

## (undated) DEVICE — DRSG KERLIX 2 1/4"X3YDS ROLL 6720

## (undated) DEVICE — PREP CHLORAPREP 26ML TINTED HI-LITE ORANGE 930815

## (undated) DEVICE — GLOVE BIOGEL PI ORTHOPRO SZ 7.5 47675

## (undated) DEVICE — SYR 50ML SLIP TIP W/O NDL 309654

## (undated) DEVICE — BLADE KNIFE SURG 15 371115

## (undated) DEVICE — DRSG ABDOMINAL PAD UNSTERILE 5X9" 9190

## (undated) DEVICE — DAVINCI SEAL CANNULA AND STPL 12MM 470380

## (undated) DEVICE — PAD POS XL 1X20X40IN PINK PIGAZZI

## (undated) DEVICE — DAVINCI XI OBTURATOR BLADELESS 8MM 470359

## (undated) DEVICE — SHEATH INTRODUCER DRYSEAL FLEX W/HYDRO CT 12FRX33CM DSF1233

## (undated) DEVICE — WIRE GUIDE LUNDERQUIST 0.035"X260CM DBL CVD

## (undated) DEVICE — GUIDEWIRE 0.018"X300CM STEERABLE V-18 CONTROL M001468540

## (undated) DEVICE — ESU LIGASURE MARYLAND LAPAROSCOPIC SLR/DVDR 5MMX37CM LF1937

## (undated) DEVICE — LUBRICANT INST ELECTROLUBE EL101

## (undated) DEVICE — GLOVE UNDER INDICATOR PI SZ 6.5 LF 41665

## (undated) DEVICE — DAVINCI XI REDUCER 8-12MM 470381

## (undated) DEVICE — DRESSING COVERLET STRIP 3/4 X 3 LF 230

## (undated) DEVICE — SUTURE MONOCRYL+ 4-0 PS-2 27IN MCP426H

## (undated) DEVICE — DRAPE CV INCISE CVARTS 89466

## (undated) DEVICE — SU WND CLOSURE V-LOC 90 SZ 2-0 12" GS-21 VLOCM0315

## (undated) DEVICE — GUIDEWIRE TERUMO .035X180 STR STIFF GS3505

## (undated) DEVICE — LUBRICANT SURG 2 OZ STRL FLIP TOP 2OZLUB

## (undated) DEVICE — NEEDLE HYPO 21GA X 1-1/2 SAFETY 305917

## (undated) DEVICE — DRAPE U SPLIT 74X120" 29440

## (undated) DEVICE — SOL WATER IRRIG 1000ML BOTTLE 2F7114

## (undated) DEVICE — CATH BALLOON RELIANT STENT GRAFT 8FR REL46

## (undated) DEVICE — ADH SKIN CLOSURE PREMIERPRO EXOFIN 1.0ML 3470

## (undated) DEVICE — DRSG STERI STRIP 1/2X4" R1547

## (undated) DEVICE — DRAPE SHEET TABLE COVER KC 42301*

## (undated) DEVICE — SU SILK 4-0 TIE 18' A183H

## (undated) DEVICE — CUSTOM CATH LAB BASIN SET PACK 640PACK LHE SUTCNBPFCA

## (undated) DEVICE — MARKER SURG SKIN STRL 77734

## (undated) DEVICE — DAVINCI XI SEAL UNIVERSAL 5-8MM 470361

## (undated) DEVICE — DRAPE CARM 12IN F/XRAY GE 5774511

## (undated) DEVICE — GEL ULTRASOUND AQUASONIC 20GM 01-01

## (undated) DEVICE — PLATE GROUNDING ADULT W/CORD 9165L

## (undated) DEVICE — GOWN IMPERVIOUS BREATHABLE SMART XLG 89045

## (undated) DEVICE — SU ETHIBOND 0 CT-2 30" X412H

## (undated) DEVICE — MITT PRE-OP 7 L X 5 1/2 W 5177M1

## (undated) DEVICE — DRAPE C-ARM 60X42" 1013

## (undated) DEVICE — GLIDEWIRE TERUMO .035X180 ANG STIFF GS3508

## (undated) DEVICE — DRAPE SHEET REV FOLD 3/4 9349

## (undated) DEVICE — DAVINCI HOT SHEARS TIP COVER  400180

## (undated) DEVICE — SU WND CLOSURE V-LOC PBT SZ 0 18" GS-21 VLOCN0326

## (undated) DEVICE — GUIDEWIRE TERUMO .035X180 ANG GR3508

## (undated) DEVICE — TROCAR PORT BLADELESS 5X100MM IAS5-100LP

## (undated) DEVICE — DRAPE IOBAN INCISE 23X17" 6650EZ

## (undated) DEVICE — DECANTER VIAL 2006S

## (undated) DEVICE — BLADE KNIFE SURG 11 371111

## (undated) DEVICE — GOWN IMPERVIOUS ZONED XLG 9041

## (undated) DEVICE — CUSTOM PACK SPEC PROCEDURE SAN5BSPHEA

## (undated) DEVICE — CATH TRAY FOLEY SURESTEP 16FR DRAIN BAG STATOCK A899916

## (undated) DEVICE — NDL INSUFFLATION 13GA 120MM C2201

## (undated) DEVICE — INTRO TERUMO 7FRX10CM PINNACLE W/MARKER RSB702

## (undated) DEVICE — COVER ULTRASOUND PROBE STRL 9001C0197

## (undated) DEVICE — BANDAGE ADH LF 1X3 ABN3100A

## (undated) DEVICE — TUBING FILTER TRI-LUMEN AIRSEAL ASC-EVAC1

## (undated) DEVICE — DAVINCI XI DRAPE COLUMN 470341

## (undated) DEVICE — DRAPE LAP/CHOLE W/O POUCH 89225

## (undated) DEVICE — CUSTOM PACK GEN MAJOR SBA5BGMHEA

## (undated) RX ORDER — EPHEDRINE SULFATE 50 MG/ML
INJECTION, SOLUTION INTRAMUSCULAR; INTRAVENOUS; SUBCUTANEOUS
Status: DISPENSED
Start: 2022-04-14

## (undated) RX ORDER — DEXAMETHASONE SODIUM PHOSPHATE 10 MG/ML
INJECTION INTRAMUSCULAR; INTRAVENOUS
Status: DISPENSED
Start: 2022-04-14

## (undated) RX ORDER — LIDOCAINE HYDROCHLORIDE AND EPINEPHRINE 10; 10 MG/ML; UG/ML
INJECTION, SOLUTION INFILTRATION; PERINEURAL
Status: DISPENSED
Start: 2021-12-01

## (undated) RX ORDER — LIDOCAINE HYDROCHLORIDE 20 MG/ML
JELLY TOPICAL
Status: DISPENSED
Start: 2021-12-01

## (undated) RX ORDER — ONDANSETRON 2 MG/ML
INJECTION INTRAMUSCULAR; INTRAVENOUS
Status: DISPENSED
Start: 2022-04-14

## (undated) RX ORDER — HEPARIN SODIUM 1000 [USP'U]/ML
INJECTION, SOLUTION INTRAVENOUS; SUBCUTANEOUS
Status: DISPENSED
Start: 2021-12-01

## (undated) RX ORDER — FENTANYL CITRATE-0.9 % NACL/PF 10 MCG/ML
PLASTIC BAG, INJECTION (ML) INTRAVENOUS
Status: DISPENSED
Start: 2022-04-14

## (undated) RX ORDER — FENTANYL CITRATE 50 UG/ML
INJECTION, SOLUTION INTRAMUSCULAR; INTRAVENOUS
Status: DISPENSED
Start: 2022-04-14

## (undated) RX ORDER — LIDOCAINE HYDROCHLORIDE 20 MG/ML
INJECTION, SOLUTION INFILTRATION; PERINEURAL
Status: DISPENSED
Start: 2022-04-14

## (undated) RX ORDER — BACITRACIN ZINC 500 [USP'U]/G
OINTMENT TOPICAL
Status: DISPENSED
Start: 2021-12-01

## (undated) RX ORDER — PROPOFOL 10 MG/ML
INJECTION, EMULSION INTRAVENOUS
Status: DISPENSED
Start: 2022-04-14

## (undated) RX ORDER — BUPIVACAINE HYDROCHLORIDE 2.5 MG/ML
INJECTION, SOLUTION EPIDURAL; INFILTRATION; INTRACAUDAL
Status: DISPENSED
Start: 2021-12-01